# Patient Record
Sex: FEMALE | Race: WHITE | NOT HISPANIC OR LATINO | URBAN - METROPOLITAN AREA
[De-identification: names, ages, dates, MRNs, and addresses within clinical notes are randomized per-mention and may not be internally consistent; named-entity substitution may affect disease eponyms.]

---

## 2017-01-12 ENCOUNTER — OFFICE (OUTPATIENT)
Dept: URBAN - METROPOLITAN AREA CLINIC 64 | Facility: CLINIC | Age: 57
End: 2017-01-12

## 2017-01-12 VITALS
WEIGHT: 88 LBS | SYSTOLIC BLOOD PRESSURE: 135 MMHG | HEIGHT: 62 IN | HEART RATE: 79 BPM | DIASTOLIC BLOOD PRESSURE: 77 MMHG

## 2017-01-12 DIAGNOSIS — Z43.1 ENCOUNTER FOR ATTENTION TO GASTROSTOMY: ICD-10-CM

## 2017-01-12 DIAGNOSIS — R11.2 NAUSEA WITH VOMITING, UNSPECIFIED: ICD-10-CM

## 2017-01-12 PROCEDURE — 43760: CPT | Performed by: NURSE PRACTITIONER

## 2017-01-12 RX ORDER — ONDANSETRON HYDROCHLORIDE 4 MG/1
12 TABLET, FILM COATED ORAL
Qty: 21 | Refills: 3 | Status: ACTIVE
Start: 2017-01-12

## 2017-05-09 ENCOUNTER — HOSPITAL ENCOUNTER (OUTPATIENT)
Dept: FAMILY MEDICINE CLINIC | Facility: CLINIC | Age: 57
Setting detail: SPECIMEN
Discharge: HOME | End: 2017-05-09
Attending: NURSE PRACTITIONER | Admitting: NURSE PRACTITIONER

## 2017-05-09 LAB
AMPICILLIN SUSC ISLT: NORMAL
AZTREONAM SUSC ISLT: NORMAL
BACTERIA ISLT: NORMAL
BACTERIA SPEC AEROBE CULT: NORMAL
CEFAZOLIN SUSC ISLT: NORMAL
CEFEPIME SUSC ISLT: NORMAL
CEFTRIAXONE SUSC ISLT: NORMAL
CIPROFLOXACIN SUSC ISLT: NORMAL
COLONY COUNT: NORMAL
ERTAPENEM SUSC ISLT: NORMAL
LEVOFLOXACIN SUSC ISLT: NORMAL
Lab: NORMAL
MEROPENEM SUSC ISLT: NORMAL
MICRO REPORT STATUS: NORMAL
NITROFURANTOIN SUSC ISLT: NORMAL
PIP+TAZO SUSC ISLT: NORMAL
SPECIMEN SOURCE: NORMAL
SUSC METH SPEC: NORMAL
TETRACYCLINE SUSC ISLT: NORMAL
TOBRAMYCIN SUSC ISLT: NORMAL
TRIMETHOPRIM/SULFA: NORMAL

## 2017-07-21 ENCOUNTER — OFFICE (OUTPATIENT)
Dept: URBAN - METROPOLITAN AREA CLINIC 64 | Facility: CLINIC | Age: 57
End: 2017-07-21

## 2017-07-21 VITALS
SYSTOLIC BLOOD PRESSURE: 119 MMHG | HEART RATE: 54 BPM | DIASTOLIC BLOOD PRESSURE: 91 MMHG | HEIGHT: 62 IN | WEIGHT: 88 LBS

## 2017-07-21 DIAGNOSIS — R11.2 NAUSEA WITH VOMITING, UNSPECIFIED: ICD-10-CM

## 2017-07-21 DIAGNOSIS — Z43.1 ENCOUNTER FOR ATTENTION TO GASTROSTOMY: ICD-10-CM

## 2017-07-21 PROCEDURE — 99214 OFFICE O/P EST MOD 30 MIN: CPT | Performed by: NURSE PRACTITIONER

## 2017-07-21 RX ORDER — LANSOPRAZOLE 30 MG/1
TABLET, ORALLY DISINTEGRATING, DELAYED RELEASE ORAL
Qty: 30 | Refills: 3 | Status: ACTIVE
Start: 2017-07-21

## 2017-07-21 RX ORDER — METOCLOPRAMIDE HYDROCHLORIDE 5 MG/1
10 TABLET ORAL
Qty: 60 | Refills: 1 | Status: COMPLETED
Start: 2017-07-21 | End: 2017-11-14

## 2017-07-25 ENCOUNTER — ON CAMPUS - OUTPATIENT (OUTPATIENT)
Dept: URBAN - METROPOLITAN AREA HOSPITAL 85 | Facility: HOSPITAL | Age: 57
End: 2017-07-25
Payer: COMMERCIAL

## 2017-07-25 DIAGNOSIS — R11.2 NAUSEA WITH VOMITING, UNSPECIFIED: ICD-10-CM

## 2017-07-25 DIAGNOSIS — K21.9 GASTRO-ESOPHAGEAL REFLUX DISEASE WITHOUT ESOPHAGITIS: ICD-10-CM

## 2017-07-25 DIAGNOSIS — R63.3 FEEDING DIFFICULTIES: ICD-10-CM

## 2017-07-25 PROCEDURE — 99204 OFFICE O/P NEW MOD 45 MIN: CPT | Performed by: INTERNAL MEDICINE

## 2017-08-01 ENCOUNTER — HOSPITAL ENCOUNTER (OUTPATIENT)
Dept: FAMILY MEDICINE CLINIC | Facility: CLINIC | Age: 57
Setting detail: SPECIMEN
Discharge: HOME | End: 2017-08-01
Attending: FAMILY MEDICINE | Admitting: FAMILY MEDICINE

## 2017-08-10 ENCOUNTER — HOSPITAL ENCOUNTER (OUTPATIENT)
Dept: INFUSION THERAPY | Facility: HOSPITAL | Age: 57
Discharge: HOME | End: 2017-08-10
Attending: FAMILY MEDICINE | Admitting: FAMILY MEDICINE

## 2017-08-10 LAB
ALBUMIN SERPL-MCNC: 3.4 G/DL (ref 3.5–4.8)
ALBUMIN/GLOB SERPL: 0.9 {RATIO} (ref 1–1.7)
ALP SERPL-CCNC: 92 IU/L (ref 32–91)
ALT SERPL-CCNC: 7 IU/L (ref 14–54)
ANION GAP SERPL CALC-SCNC: 14 MMOL/L (ref 10–20)
AST SERPL-CCNC: 15 IU/L (ref 15–41)
BASOPHILS # BLD AUTO: 0 10*3/UL (ref 0–0.2)
BASOPHILS NFR BLD AUTO: 0 % (ref 0–2)
BILIRUB SERPL-MCNC: 0.5 MG/DL (ref 0.3–1.2)
BUN SERPL-MCNC: 18 MG/DL (ref 8–20)
BUN/CREAT SERPL: 20 (ref 5.4–26.2)
CALCIUM SERPL-MCNC: 8.5 MG/DL (ref 8.9–10.3)
CHLORIDE SERPL-SCNC: 96 MMOL/L (ref 101–111)
CONV CO2: 25 MMOL/L (ref 22–32)
CONV TOTAL PROTEIN: 7 G/DL (ref 6.1–7.9)
CREAT UR-MCNC: 0.9 MG/DL (ref 0.4–1)
DIFFERENTIAL METHOD BLD: (no result)
EOSINOPHIL # BLD AUTO: 0 10*3/UL (ref 0–0.3)
EOSINOPHIL # BLD AUTO: 1 % (ref 0–3)
ERYTHROCYTE [DISTWIDTH] IN BLOOD BY AUTOMATED COUNT: 15.7 % (ref 11.5–14.5)
ERYTHROCYTE [SEDIMENTATION RATE] IN BLOOD BY WESTERGREN METHOD: 107 MM/HR (ref 0–30)
GLOBULIN UR ELPH-MCNC: 3.6 G/DL (ref 2.5–3.8)
GLUCOSE SERPL-MCNC: 108 MG/DL (ref 65–99)
HCT VFR BLD AUTO: 25.9 % (ref 35–49)
HGB BLD-MCNC: (no result) G/DL (ref 12–15)
LYMPHOCYTES # BLD AUTO: 0.5 10*3/UL (ref 0.8–4.8)
LYMPHOCYTES NFR BLD AUTO: 5 % (ref 18–42)
MCH RBC QN AUTO: 32.7 PG (ref 26–32)
MCHC RBC AUTO-ENTMCNC: 33.2 G/DL (ref 32–36)
MCV RBC AUTO: 98.5 FL (ref 80–94)
MONOCYTES # BLD AUTO: 0.4 10*3/UL (ref 0.1–1.3)
MONOCYTES NFR BLD AUTO: 4 % (ref 2–11)
NEUTROPHILS # BLD AUTO: 8.5 10*3/UL (ref 2.3–8.6)
NEUTROPHILS NFR BLD AUTO: 90 % (ref 50–75)
NRBC BLD AUTO-RTO: 0 /100{WBCS}
NRBC/RBC NFR BLD MANUAL: 0 10*3/UL
PLATELET # BLD AUTO: 300 10*3/UL (ref 150–450)
PMV BLD AUTO: 7 FL (ref 7.4–10.4)
POTASSIUM SERPL-SCNC: 5 MMOL/L (ref 3.6–5.1)
RBC # BLD AUTO: 2.63 10*6/UL (ref 4–5.4)
SODIUM SERPL-SCNC: 130 MMOL/L (ref 136–144)
WBC # BLD AUTO: 9.4 10*3/UL (ref 4.5–11.5)

## 2017-08-11 ENCOUNTER — OFFICE (OUTPATIENT)
Dept: URBAN - METROPOLITAN AREA CLINIC 64 | Facility: CLINIC | Age: 57
End: 2017-08-11

## 2017-08-11 VITALS — HEIGHT: 62 IN | DIASTOLIC BLOOD PRESSURE: 56 MMHG | SYSTOLIC BLOOD PRESSURE: 128 MMHG | HEART RATE: 58 BPM

## 2017-08-11 DIAGNOSIS — Z43.1 ENCOUNTER FOR ATTENTION TO GASTROSTOMY: ICD-10-CM

## 2017-08-11 DIAGNOSIS — R50.9 FEVER, UNSPECIFIED: ICD-10-CM

## 2017-08-11 DIAGNOSIS — R11.2 NAUSEA WITH VOMITING, UNSPECIFIED: ICD-10-CM

## 2017-08-11 PROCEDURE — 99213 OFFICE O/P EST LOW 20 MIN: CPT | Performed by: NURSE PRACTITIONER

## 2017-08-11 RX ORDER — CEPHALEXIN 500 MG/1
2000 CAPSULE ORAL
Qty: 40 | Refills: 0 | Status: COMPLETED
Start: 2017-08-11 | End: 2019-01-01

## 2017-08-15 ENCOUNTER — HOSPITAL ENCOUNTER (OUTPATIENT)
Dept: FAMILY MEDICINE CLINIC | Facility: CLINIC | Age: 57
Setting detail: SPECIMEN
Discharge: HOME | End: 2017-08-15
Attending: FAMILY MEDICINE | Admitting: FAMILY MEDICINE

## 2017-08-16 ENCOUNTER — HOSPITAL ENCOUNTER (OUTPATIENT)
Dept: INFUSION THERAPY | Facility: HOSPITAL | Age: 57
Discharge: HOME | End: 2017-08-16
Attending: FAMILY MEDICINE | Admitting: FAMILY MEDICINE

## 2017-08-16 LAB
ABS VARIANT LYMPHS: 0.05 10*3/UL
ALBUMIN SERPL-MCNC: 3.3 G/DL (ref 3.5–4.8)
ALBUMIN/GLOB SERPL: 0.9 {RATIO} (ref 1–1.7)
ALP SERPL-CCNC: 93 IU/L (ref 32–91)
ALT SERPL-CCNC: 10 IU/L (ref 14–54)
ANION GAP SERPL CALC-SCNC: 14.4 MMOL/L (ref 10–20)
AST SERPL-CCNC: 15 IU/L (ref 15–41)
BILIRUB SERPL-MCNC: 0.4 MG/DL (ref 0.3–1.2)
BUN SERPL-MCNC: 20 MG/DL (ref 8–20)
BUN/CREAT SERPL: 18.2 (ref 5.4–26.2)
CALCIUM SERPL-MCNC: 8.7 MG/DL (ref 8.9–10.3)
CHLORIDE SERPL-SCNC: 100 MMOL/L (ref 101–111)
CONV CO2: 24 MMOL/L (ref 22–32)
CONV TOTAL PROTEIN: 6.8 G/DL (ref 6.1–7.9)
CONV VARIANT LYMPHOCYTES RELATIVE PERCENT BY MANUAL COUNT: 1 % (ref 0–1)
CREAT UR-MCNC: 1.1 MG/DL (ref 0.4–1)
DIFFERENTIAL METHOD BLD: (no result)
EOSINOPHIL # BLD AUTO: 0.1 10*3/UL (ref 0–0.3)
EOSINOPHIL # BLD AUTO: 1 % (ref 0–3)
ERYTHROCYTE [DISTWIDTH] IN BLOOD BY AUTOMATED COUNT: 15.5 % (ref 11.5–14.5)
FERRITIN SERPL-MCNC: 828 NG/ML (ref 11–307)
FOLATE SERPL-MCNC: 18.3 NG/ML (ref 5.9–24.8)
GLOBULIN UR ELPH-MCNC: 3.5 G/DL (ref 2.5–3.8)
GLUCOSE SERPL-MCNC: 91 MG/DL (ref 65–99)
HCT VFR BLD AUTO: 25.5 % (ref 35–49)
HGB BLD-MCNC: 8.3 G/DL (ref 12–15)
IRON SERPL-MCNC: 31 UG/DL (ref 28–170)
LYMPHOCYTES # BLD AUTO: 0.4 10*3/UL (ref 0.8–4.8)
LYMPHOCYTES NFR BLD AUTO: 8 % (ref 18–42)
MAGNESIUM UR-MCNC: 1.21 % (ref 0.5–1.5)
MCH RBC QN AUTO: 31.9 PG (ref 26–32)
MCHC RBC AUTO-ENTMCNC: 32.7 G/DL (ref 32–36)
MCV RBC AUTO: 97.5 FL (ref 80–94)
MONOCYTES # BLD AUTO: 0.2 10*3/UL (ref 0.1–1.3)
MONOCYTES NFR BLD AUTO: 4 % (ref 2–11)
NEUTROPHILS # BLD AUTO: 4.3 10*3/UL (ref 2.3–8.6)
NEUTROPHILS NFR BLD AUTO: 86 % (ref 50–75)
PLATELET # BLD AUTO: (no result) 10*3/UL (ref 150–450)
PLT COMMENT: (no result)
PMV BLD AUTO: 6.4 FL (ref 7.4–10.4)
POTASSIUM SERPL-SCNC: 4.4 MMOL/L (ref 3.6–5.1)
RBC # BLD AUTO: 2.61 10*6/UL (ref 4–5.4)
RETICS/RBC NFR MANUAL: 0.03 10*6/UL
SODIUM SERPL-SCNC: 134 MMOL/L (ref 136–144)
VIT B12 SERPL-MCNC: >1500 PG/ML (ref 180–914)
WBC # BLD AUTO: 5 10*3/UL (ref 4.5–11.5)

## 2017-08-18 ENCOUNTER — HOSPITAL ENCOUNTER (OUTPATIENT)
Dept: MRI IMAGING | Facility: HOSPITAL | Age: 57
Discharge: HOME | End: 2017-08-18
Attending: FAMILY MEDICINE | Admitting: FAMILY MEDICINE

## 2017-09-19 ENCOUNTER — HOSPITAL ENCOUNTER (OUTPATIENT)
Dept: PAIN MEDICINE | Facility: HOSPITAL | Age: 57
Discharge: HOME | End: 2017-09-19
Attending: ANESTHESIOLOGY | Admitting: ANESTHESIOLOGY

## 2017-10-03 ENCOUNTER — INPATIENT HOSPITAL (OUTPATIENT)
Dept: URBAN - METROPOLITAN AREA HOSPITAL 85 | Facility: HOSPITAL | Age: 57
End: 2017-10-03
Payer: COMMERCIAL

## 2017-10-03 DIAGNOSIS — Z93.1 GASTROSTOMY STATUS: ICD-10-CM

## 2017-10-03 DIAGNOSIS — R04.2 HEMOPTYSIS: ICD-10-CM

## 2017-10-03 DIAGNOSIS — C09.9 MALIGNANT NEOPLASM OF TONSIL, UNSPECIFIED: ICD-10-CM

## 2017-10-03 DIAGNOSIS — R11.2 NAUSEA WITH VOMITING, UNSPECIFIED: ICD-10-CM

## 2017-10-03 DIAGNOSIS — D50.9 IRON DEFICIENCY ANEMIA, UNSPECIFIED: ICD-10-CM

## 2017-10-03 DIAGNOSIS — R63.3 FEEDING DIFFICULTIES: ICD-10-CM

## 2017-10-03 PROCEDURE — 43235 EGD DIAGNOSTIC BRUSH WASH: CPT | Performed by: INTERNAL MEDICINE

## 2017-10-10 ENCOUNTER — HOSPITAL ENCOUNTER (OUTPATIENT)
Dept: PAIN MEDICINE | Facility: HOSPITAL | Age: 57
Discharge: HOME | End: 2017-10-10
Attending: ANESTHESIOLOGY | Admitting: ANESTHESIOLOGY

## 2017-10-13 ENCOUNTER — HOSPITAL ENCOUNTER (OUTPATIENT)
Dept: INFUSION THERAPY | Facility: HOSPITAL | Age: 57
Discharge: HOME | End: 2017-10-13
Attending: FAMILY MEDICINE | Admitting: FAMILY MEDICINE

## 2017-10-13 LAB — CALCIUM SERPL-MCNC: 8.9 MG/DL (ref 8.9–10.3)

## 2017-11-07 ENCOUNTER — HOSPITAL ENCOUNTER (OUTPATIENT)
Dept: PAIN MEDICINE | Facility: HOSPITAL | Age: 57
Discharge: HOME | End: 2017-11-07
Attending: ANESTHESIOLOGY | Admitting: ANESTHESIOLOGY

## 2017-11-08 ENCOUNTER — HOSPITAL ENCOUNTER (OUTPATIENT)
Dept: GENERAL RADIOLOGY | Facility: HOSPITAL | Age: 57
Discharge: HOME | End: 2017-11-08
Attending: FAMILY MEDICINE | Admitting: FAMILY MEDICINE

## 2017-11-08 ENCOUNTER — HOSPITAL ENCOUNTER (OUTPATIENT)
Dept: FAMILY MEDICINE CLINIC | Facility: CLINIC | Age: 57
Setting detail: SPECIMEN
Discharge: HOME | End: 2017-11-08
Attending: FAMILY MEDICINE | Admitting: FAMILY MEDICINE

## 2017-11-08 LAB
ALBUMIN SERPL-MCNC: 3.8 G/DL (ref 3.5–4.8)
ALBUMIN/GLOB SERPL: 0.9 {RATIO} (ref 1–1.7)
ALP SERPL-CCNC: 66 IU/L (ref 32–91)
ALT SERPL-CCNC: 14 IU/L (ref 14–54)
ANION GAP SERPL CALC-SCNC: 14 MMOL/L (ref 10–20)
AST SERPL-CCNC: 26 IU/L (ref 15–41)
B PERT DNA SPEC QL NAA+PROBE: NOT DETECTED
BASOPHILS # BLD AUTO: 0 10*3/UL (ref 0–0.2)
BASOPHILS NFR BLD AUTO: 0 % (ref 0–2)
BILIRUB SERPL-MCNC: 0.5 MG/DL (ref 0.3–1.2)
BUN SERPL-MCNC: 14 MG/DL (ref 8–20)
BUN/CREAT SERPL: 9.3 (ref 5.4–26.2)
C PNEUM DNA NPH QL NAA+NON-PROBE: NOT DETECTED
CALCIUM SERPL-MCNC: 6.7 MG/DL (ref 8.9–10.3)
CHLORIDE SERPL-SCNC: 97 MMOL/L (ref 101–111)
CONV CO2: 22 MMOL/L (ref 22–32)
CONV RESPNL SPECIMEN: NORMAL
CONV TOTAL PROTEIN: 7.9 G/DL (ref 6.1–7.9)
CREAT UR-MCNC: 1.5 MG/DL (ref 0.4–1)
DIFFERENTIAL METHOD BLD: (no result)
EOSINOPHIL # BLD AUTO: 0.1 10*3/UL (ref 0–0.3)
EOSINOPHIL # BLD AUTO: 1 % (ref 0–3)
ERYTHROCYTE [DISTWIDTH] IN BLOOD BY AUTOMATED COUNT: 16.7 % (ref 11.5–14.5)
FLUAV H1 2009 PAND RNA NPH QL NAA+PROBE: NOT DETECTED
FLUAV H1 HA GENE NPH QL NAA+PROBE: NOT DETECTED
FLUAV H3 RNA NPH QL NAA+PROBE: NOT DETECTED
FLUAV SUBTYP SPEC NAA+PROBE: NOT DETECTED
FLUBV RNA ISLT QL NAA+PROBE: NOT DETECTED
GLOBULIN UR ELPH-MCNC: 4.1 G/DL (ref 2.5–3.8)
GLUCOSE SERPL-MCNC: 109 MG/DL (ref 65–99)
HADV DNA SPEC NAA+PROBE: NOT DETECTED
HCOV 229E RNA SPEC QL NAA+PROBE: NOT DETECTED
HCOV HKU1 RNA SPEC QL NAA+PROBE: NOT DETECTED
HCOV NL63 RNA SPEC QL NAA+PROBE: NOT DETECTED
HCOV OC43 RNA SPEC QL NAA+PROBE: NOT DETECTED
HCT VFR BLD AUTO: 28.5 % (ref 35–49)
HGB BLD-MCNC: 9.1 G/DL (ref 12–15)
HMPV RNA NPH QL NAA+NON-PROBE: NOT DETECTED
HPIV1 RNA ISLT QL NAA+PROBE: NOT DETECTED
HPIV2 RNA SPEC QL NAA+PROBE: NOT DETECTED
HPIV3 RNA NPH QL NAA+PROBE: NOT DETECTED
HPIV4 P GENE NPH QL NAA+PROBE: NOT DETECTED
LYMPHOCYTES # BLD AUTO: 0.4 10*3/UL (ref 0.8–4.8)
LYMPHOCYTES NFR BLD AUTO: 3 % (ref 18–42)
M PNEUMO IGG SER IA-ACNC: NOT DETECTED
MAGNESIUM SERPL-MCNC: 1.4 MG/DL (ref 1.8–2.5)
MCH RBC QN AUTO: 30.4 PG (ref 26–32)
MCHC RBC AUTO-ENTMCNC: 32.1 G/DL (ref 32–36)
MCV RBC AUTO: 94.6 FL (ref 80–94)
MONOCYTES # BLD AUTO: 0.5 10*3/UL (ref 0.1–1.3)
MONOCYTES NFR BLD AUTO: 4 % (ref 2–11)
NEUTROPHILS # BLD AUTO: 13.3 10*3/UL (ref 2.3–8.6)
NEUTROPHILS NFR BLD AUTO: 92 % (ref 50–75)
NRBC BLD AUTO-RTO: 0 /100{WBCS}
NRBC/RBC NFR BLD MANUAL: 0 10*3/UL
PLATELET # BLD AUTO: 362 10*3/UL (ref 150–450)
PMV BLD AUTO: 7.8 FL (ref 7.4–10.4)
POTASSIUM SERPL-SCNC: 4 MMOL/L (ref 3.6–5.1)
RBC # BLD AUTO: 3.01 10*6/UL (ref 4–5.4)
RHINOVIRUS RNA SPEC NAA+PROBE: NOT DETECTED
RSV RNA NPH QL NAA+NON-PROBE: NOT DETECTED
SODIUM SERPL-SCNC: 129 MMOL/L (ref 136–144)
WBC # BLD AUTO: 14.3 10*3/UL (ref 4.5–11.5)

## 2017-11-14 ENCOUNTER — OFFICE (OUTPATIENT)
Dept: URBAN - METROPOLITAN AREA CLINIC 64 | Facility: CLINIC | Age: 57
End: 2017-11-14
Payer: COMMERCIAL

## 2017-11-14 VITALS
WEIGHT: 90 LBS | SYSTOLIC BLOOD PRESSURE: 126 MMHG | DIASTOLIC BLOOD PRESSURE: 75 MMHG | HEART RATE: 70 BPM | HEIGHT: 62 IN

## 2017-11-14 DIAGNOSIS — Z43.1 ENCOUNTER FOR ATTENTION TO GASTROSTOMY: ICD-10-CM

## 2017-11-14 PROCEDURE — 43760: CPT | Performed by: INTERNAL MEDICINE

## 2017-11-27 ENCOUNTER — HOSPITAL ENCOUNTER (OUTPATIENT)
Dept: FAMILY MEDICINE CLINIC | Facility: CLINIC | Age: 57
Setting detail: SPECIMEN
Discharge: HOME | End: 2017-11-27
Attending: FAMILY MEDICINE | Admitting: FAMILY MEDICINE

## 2017-11-27 LAB
ALBUMIN SERPL-MCNC: 4 G/DL (ref 3.5–4.8)
ALBUMIN/GLOB SERPL: 1.1 {RATIO} (ref 1–1.7)
ALP SERPL-CCNC: 71 IU/L (ref 32–91)
ALT SERPL-CCNC: 7 IU/L (ref 14–54)
ANION GAP SERPL CALC-SCNC: 14.2 MMOL/L (ref 10–20)
AST SERPL-CCNC: 19 IU/L (ref 15–41)
BASOPHILS # BLD AUTO: 0 10*3/UL (ref 0–0.2)
BASOPHILS NFR BLD AUTO: 1 % (ref 0–2)
BILIRUB SERPL-MCNC: 0.4 MG/DL (ref 0.3–1.2)
BUN SERPL-MCNC: 10 MG/DL (ref 8–20)
BUN/CREAT SERPL: 7.7 (ref 5.4–26.2)
CALCIUM SERPL-MCNC: 8.5 MG/DL (ref 8.9–10.3)
CHLORIDE SERPL-SCNC: 97 MMOL/L (ref 101–111)
CONV CO2: 23 MMOL/L (ref 22–32)
CONV TOTAL PROTEIN: 7.7 G/DL (ref 6.1–7.9)
CREAT UR-MCNC: 1.3 MG/DL (ref 0.4–1)
DIFFERENTIAL METHOD BLD: (no result)
EOSINOPHIL # BLD AUTO: 0.1 10*3/UL (ref 0–0.3)
EOSINOPHIL # BLD AUTO: 3 % (ref 0–3)
ERYTHROCYTE [DISTWIDTH] IN BLOOD BY AUTOMATED COUNT: 16 % (ref 11.5–14.5)
GLOBULIN UR ELPH-MCNC: 3.7 G/DL (ref 2.5–3.8)
GLUCOSE SERPL-MCNC: 87 MG/DL (ref 65–99)
HCT VFR BLD AUTO: 28 % (ref 35–49)
HGB BLD-MCNC: 9.1 G/DL (ref 12–15)
LYMPHOCYTES # BLD AUTO: 0.7 10*3/UL (ref 0.8–4.8)
LYMPHOCYTES NFR BLD AUTO: 20 % (ref 18–42)
MCH RBC QN AUTO: 30.1 PG (ref 26–32)
MCHC RBC AUTO-ENTMCNC: 32.4 G/DL (ref 32–36)
MCV RBC AUTO: 93 FL (ref 80–94)
MONOCYTES # BLD AUTO: 0.4 10*3/UL (ref 0.1–1.3)
MONOCYTES NFR BLD AUTO: 10 % (ref 2–11)
NEUTROPHILS # BLD AUTO: 2.5 10*3/UL (ref 2.3–8.6)
NEUTROPHILS NFR BLD AUTO: 66 % (ref 50–75)
NRBC BLD AUTO-RTO: 0 /100{WBCS}
NRBC/RBC NFR BLD MANUAL: 0 10*3/UL
PLATELET # BLD AUTO: 274 10*3/UL (ref 150–450)
PMV BLD AUTO: 7.7 FL (ref 7.4–10.4)
POTASSIUM SERPL-SCNC: 4.2 MMOL/L (ref 3.6–5.1)
RBC # BLD AUTO: 3.01 10*6/UL (ref 4–5.4)
SODIUM SERPL-SCNC: 130 MMOL/L (ref 136–144)
WBC # BLD AUTO: (no result) 10*3/UL (ref 4.5–11.5)

## 2017-12-28 ENCOUNTER — HOSPITAL ENCOUNTER (OUTPATIENT)
Dept: LAB | Facility: HOSPITAL | Age: 57
Setting detail: SPECIMEN
Discharge: HOME | End: 2017-12-28
Attending: INTERNAL MEDICINE | Admitting: INTERNAL MEDICINE

## 2017-12-28 LAB
ANION GAP SERPL CALC-SCNC: 13.1 MMOL/L (ref 10–20)
BUN SERPL-MCNC: 20 MG/DL (ref 8–20)
BUN/CREAT SERPL: 16.7 (ref 5.4–26.2)
CALCIUM SERPL-MCNC: 8.8 MG/DL (ref 8.9–10.3)
CHLORIDE SERPL-SCNC: 95 MMOL/L (ref 101–111)
CONV CO2: 25 MMOL/L (ref 22–32)
CREAT UR-MCNC: 1.2 MG/DL (ref 0.4–1)
GLUCOSE SERPL-MCNC: 86 MG/DL (ref 65–99)
MAGNESIUM SERPL-MCNC: 1.9 MG/DL (ref 1.8–2.5)
POTASSIUM SERPL-SCNC: 4.1 MMOL/L (ref 3.6–5.1)
SODIUM SERPL-SCNC: 129 MMOL/L (ref 136–144)

## 2018-01-08 ENCOUNTER — HOSPITAL ENCOUNTER (OUTPATIENT)
Dept: PAIN MEDICINE | Facility: HOSPITAL | Age: 58
Discharge: HOME | End: 2018-01-08
Attending: ANESTHESIOLOGY | Admitting: ANESTHESIOLOGY

## 2018-01-08 LAB
AMPHETAMINES UR QL SCN: NEGATIVE
BZE UR QL SCN: NEGATIVE
CREAT 24H UR-MCNC: NORMAL MG/DL
METHADONE UR QL SCN: NEGATIVE
OPIATE CONFIRMATION URINE: NORMAL
THC SERPLBLD CFM-MCNC: NEGATIVE NG/ML

## 2018-02-07 ENCOUNTER — HOSPITAL ENCOUNTER (OUTPATIENT)
Dept: PAIN MEDICINE | Facility: HOSPITAL | Age: 58
Discharge: HOME | End: 2018-02-07
Attending: ANESTHESIOLOGY | Admitting: ANESTHESIOLOGY

## 2018-02-08 ENCOUNTER — OFFICE (OUTPATIENT)
Dept: URBAN - METROPOLITAN AREA CLINIC 64 | Facility: CLINIC | Age: 58
End: 2018-02-08

## 2018-02-08 VITALS
SYSTOLIC BLOOD PRESSURE: 126 MMHG | WEIGHT: 84 LBS | DIASTOLIC BLOOD PRESSURE: 71 MMHG | HEIGHT: 62 IN | HEART RATE: 75 BPM

## 2018-02-08 DIAGNOSIS — Z43.1 ENCOUNTER FOR ATTENTION TO GASTROSTOMY: ICD-10-CM

## 2018-02-08 PROCEDURE — 43760: CPT | Performed by: NURSE PRACTITIONER

## 2018-03-06 ENCOUNTER — HOSPITAL ENCOUNTER (OUTPATIENT)
Dept: PAIN MEDICINE | Facility: HOSPITAL | Age: 58
Discharge: HOME | End: 2018-03-06
Attending: ANESTHESIOLOGY | Admitting: ANESTHESIOLOGY

## 2018-03-12 ENCOUNTER — HOSPITAL ENCOUNTER (OUTPATIENT)
Dept: FAMILY MEDICINE CLINIC | Facility: CLINIC | Age: 58
Setting detail: SPECIMEN
Discharge: HOME | End: 2018-03-12
Attending: FAMILY MEDICINE | Admitting: FAMILY MEDICINE

## 2018-03-12 LAB
ALBUMIN SERPL-MCNC: 4.3 G/DL (ref 3.5–4.8)
ALBUMIN/GLOB SERPL: 1.2 {RATIO} (ref 1–1.7)
ALP SERPL-CCNC: 63 IU/L (ref 32–91)
ALT SERPL-CCNC: 15 IU/L (ref 14–54)
ANION GAP SERPL CALC-SCNC: 10.7 MMOL/L (ref 10–20)
AST SERPL-CCNC: 25 IU/L (ref 15–41)
BASOPHILS # BLD AUTO: 0 10*3/UL (ref 0–0.2)
BASOPHILS NFR BLD AUTO: 1 % (ref 0–2)
BILIRUB SERPL-MCNC: 0.4 MG/DL (ref 0.3–1.2)
BUN SERPL-MCNC: 22 MG/DL (ref 8–20)
BUN/CREAT SERPL: 22 (ref 5.4–26.2)
CALCIUM SERPL-MCNC: 9 MG/DL (ref 8.9–10.3)
CHLORIDE SERPL-SCNC: 94 MMOL/L (ref 101–111)
CHOLEST SERPL-MCNC: 130 MG/DL
CHOLEST/HDLC SERPL: 1.8 {RATIO}
CONV CO2: 26 MMOL/L (ref 22–32)
CONV LDL CHOLESTEROL DIRECT: 33 MG/DL (ref 0–100)
CONV TOTAL PROTEIN: 7.9 G/DL (ref 6.1–7.9)
CREAT UR-MCNC: 1 MG/DL (ref 0.4–1)
DIFFERENTIAL METHOD BLD: (no result)
EOSINOPHIL # BLD AUTO: 0.2 10*3/UL (ref 0–0.3)
EOSINOPHIL # BLD AUTO: 5 % (ref 0–3)
ERYTHROCYTE [DISTWIDTH] IN BLOOD BY AUTOMATED COUNT: 16.4 % (ref 11.5–14.5)
GLOBULIN UR ELPH-MCNC: 3.6 G/DL (ref 2.5–3.8)
GLUCOSE SERPL-MCNC: 94 MG/DL (ref 65–99)
HCT VFR BLD AUTO: 29.7 % (ref 35–49)
HDLC SERPL-MCNC: 73 MG/DL
HGB BLD-MCNC: 9.9 G/DL (ref 12–15)
LDLC/HDLC SERPL: 0.5 {RATIO}
LIPID INTERPRETATION: ABNORMAL
LYMPHOCYTES # BLD AUTO: 0.6 10*3/UL (ref 0.8–4.8)
LYMPHOCYTES NFR BLD AUTO: 16 % (ref 18–42)
MCH RBC QN AUTO: 34.2 PG (ref 26–32)
MCHC RBC AUTO-ENTMCNC: 33.5 G/DL (ref 32–36)
MCV RBC AUTO: 102.2 FL (ref 80–94)
MONOCYTES # BLD AUTO: 0.3 10*3/UL (ref 0.1–1.3)
MONOCYTES NFR BLD AUTO: 9 % (ref 2–11)
NEUTROPHILS # BLD AUTO: 2.4 10*3/UL (ref 2.3–8.6)
NEUTROPHILS NFR BLD AUTO: 69 % (ref 50–75)
NRBC BLD AUTO-RTO: 0 /100{WBCS}
NRBC/RBC NFR BLD MANUAL: 0 10*3/UL
PLATELET # BLD AUTO: 271 10*3/UL (ref 150–450)
PMV BLD AUTO: 7.9 FL (ref 7.4–10.4)
POTASSIUM SERPL-SCNC: 3.7 MMOL/L (ref 3.6–5.1)
RBC # BLD AUTO: 2.91 10*6/UL (ref 4–5.4)
SODIUM SERPL-SCNC: 127 MMOL/L (ref 136–144)
TRIGL SERPL-MCNC: 117 MG/DL
VLDLC SERPL CALC-MCNC: 24.2 MG/DL
WBC # BLD AUTO: 3.4 10*3/UL (ref 4.5–11.5)

## 2018-05-01 ENCOUNTER — HOSPITAL ENCOUNTER (OUTPATIENT)
Dept: FAMILY MEDICINE CLINIC | Facility: CLINIC | Age: 58
Setting detail: SPECIMEN
Discharge: HOME | End: 2018-05-01
Attending: PHYSICIAN ASSISTANT | Admitting: PHYSICIAN ASSISTANT

## 2018-05-01 LAB
ALBUMIN SERPL-MCNC: 3.4 G/DL (ref 3.5–4.8)
ALBUMIN/GLOB SERPL: 1 {RATIO} (ref 1–1.7)
ALP SERPL-CCNC: 76 IU/L (ref 32–91)
ALT SERPL-CCNC: 11 IU/L (ref 14–54)
ANION GAP SERPL CALC-SCNC: 14.9 MMOL/L (ref 10–20)
AST SERPL-CCNC: 20 IU/L (ref 15–41)
BACTERIA ISLT: NORMAL
BACTERIA SPEC AEROBE CULT: NORMAL
BASOPHILS # BLD AUTO: 0 10*3/UL (ref 0–0.2)
BASOPHILS NFR BLD AUTO: 0 % (ref 0–2)
BILIRUB SERPL-MCNC: 0.3 MG/DL (ref 0.3–1.2)
BUN SERPL-MCNC: 26 MG/DL (ref 8–20)
BUN/CREAT SERPL: 17.3 (ref 5.4–26.2)
CALCIUM SERPL-MCNC: 9.8 MG/DL (ref 8.9–10.3)
CHLORIDE SERPL-SCNC: 92 MMOL/L (ref 101–111)
CLINDAMYCIN SUSC ISLT: NORMAL
CONV CO2: 27 MMOL/L (ref 22–32)
CONV TOTAL PROTEIN: 6.8 G/DL (ref 6.1–7.9)
CREAT UR-MCNC: 1.5 MG/DL (ref 0.4–1)
DIFFERENTIAL METHOD BLD: (no result)
EOSINOPHIL # BLD AUTO: 0.3 10*3/UL (ref 0–0.3)
EOSINOPHIL # BLD AUTO: 4 % (ref 0–3)
ERYTHROCYTE [DISTWIDTH] IN BLOOD BY AUTOMATED COUNT: 13.8 % (ref 11.5–14.5)
ERYTHROMYCIN SUSC ISLT: NORMAL
GLOBULIN UR ELPH-MCNC: 3.4 G/DL (ref 2.5–3.8)
GLUCOSE SERPL-MCNC: 123 MG/DL (ref 65–99)
GRAM STN SPEC: NORMAL
HCT VFR BLD AUTO: 25.2 % (ref 35–49)
HGB BLD-MCNC: 8.4 G/DL (ref 12–15)
LINEZOLID SUSC ISLT: NORMAL
LYMPHOCYTES # BLD AUTO: 0.4 10*3/UL (ref 0.8–4.8)
LYMPHOCYTES NFR BLD AUTO: 6 % (ref 18–42)
Lab: NORMAL
MCH RBC QN AUTO: 34.6 PG (ref 26–32)
MCHC RBC AUTO-ENTMCNC: 33.2 G/DL (ref 32–36)
MCV RBC AUTO: 104 FL (ref 80–94)
MICRO REPORT STATUS: NORMAL
MONOCYTES # BLD AUTO: 0.6 10*3/UL (ref 0.1–1.3)
MONOCYTES NFR BLD AUTO: 9 % (ref 2–11)
NEUTROPHILS # BLD AUTO: 5.4 10*3/UL (ref 2.3–8.6)
NEUTROPHILS NFR BLD AUTO: 81 % (ref 50–75)
NRBC BLD AUTO-RTO: 0 /100{WBCS}
NRBC/RBC NFR BLD MANUAL: 0 10*3/UL
OXACILLIN SUSC ISLT: NORMAL
PLATELET # BLD AUTO: 284 10*3/UL (ref 150–450)
PMV BLD AUTO: 8.1 FL (ref 7.4–10.4)
POTASSIUM SERPL-SCNC: 3.9 MMOL/L (ref 3.6–5.1)
RBC # BLD AUTO: 2.42 10*6/UL (ref 4–5.4)
SODIUM SERPL-SCNC: 130 MMOL/L (ref 136–144)
SPECIMEN SOURCE: NORMAL
SUSC METH SPEC: NORMAL
TETRACYCLINE SUSC ISLT: NORMAL
TRIMETHOPRIM/SULFA: NORMAL
VANCOMYCIN SUSC ISLT: NORMAL
WBC # BLD AUTO: 6.7 10*3/UL (ref 4.5–11.5)

## 2018-05-03 ENCOUNTER — HOSPITAL ENCOUNTER (OUTPATIENT)
Dept: PAIN MEDICINE | Facility: HOSPITAL | Age: 58
Discharge: HOME | End: 2018-05-03
Attending: ANESTHESIOLOGY | Admitting: ANESTHESIOLOGY

## 2018-05-07 ENCOUNTER — HOSPITAL ENCOUNTER (OUTPATIENT)
Dept: FAMILY MEDICINE CLINIC | Facility: CLINIC | Age: 58
Setting detail: SPECIMEN
Discharge: HOME | End: 2018-05-07
Attending: PHYSICIAN ASSISTANT | Admitting: PHYSICIAN ASSISTANT

## 2018-05-07 LAB
ANION GAP SERPL CALC-SCNC: 14.2 MMOL/L (ref 10–20)
BUN SERPL-MCNC: 18 MG/DL (ref 8–20)
BUN/CREAT SERPL: 13.8 (ref 5.4–26.2)
CALCIUM SERPL-MCNC: 9.2 MG/DL (ref 8.9–10.3)
CHLORIDE SERPL-SCNC: 88 MMOL/L (ref 101–111)
CONV CO2: 27 MMOL/L (ref 22–32)
CREAT UR-MCNC: 1.3 MG/DL (ref 0.4–1)
GLUCOSE SERPL-MCNC: 77 MG/DL (ref 65–99)
IRON SATN MFR SERPL: 16 % (ref 15–50)
IRON SERPL-MCNC: 35 UG/DL (ref 28–170)
MAGNESIUM UR-MCNC: 1.22 % (ref 0.5–1.5)
POTASSIUM SERPL-SCNC: 4.2 MMOL/L (ref 3.6–5.1)
RETICS/RBC NFR MANUAL: 0.04 10*6/UL
SODIUM SERPL-SCNC: 125 MMOL/L (ref 136–144)
TIBC SERPL-MCNC: 218 UG/DL (ref 228–428)

## 2018-05-08 LAB
CONV ABS BANDS: 1 10*3/UL
CONV ABS IMM GRAN: 0.95 10*3/UL
CONV MACROCYTES IN BLOOD BY LIGHT MICROSCOPY: SLIGHT
CONV POIKILOCYTOSIS IN BLOOD BY LIGHT MICROSCOPY: (no result)
DIFFERENTIAL METHOD BLD: (no result)
EOSINOPHIL # BLD AUTO: 0.3 10*3/UL (ref 0–0.3)
EOSINOPHIL # BLD AUTO: 4 % (ref 0–3)
ERYTHROCYTE [DISTWIDTH] IN BLOOD BY AUTOMATED COUNT: 14.3 % (ref 11.5–14.5)
HCT VFR BLD AUTO: 29.8 % (ref 35–49)
HGB BLD-MCNC: (no result) G/DL (ref 12–15)
LYMPHOCYTES # BLD AUTO: 0.4 10*3/UL (ref 0.8–4.8)
LYMPHOCYTES NFR BLD AUTO: 5 % (ref 18–42)
MCH RBC QN AUTO: 34.6 PG (ref 26–32)
MCHC RBC AUTO-ENTMCNC: 34 G/DL (ref 32–36)
MCV RBC AUTO: 101.8 FL (ref 80–94)
METAMYELOCYTES NFR BLD: 5 %
MONOCYTES # BLD AUTO: 1.2 10*3/UL (ref 0.1–1.3)
MONOCYTES NFR BLD AUTO: 15 % (ref 2–11)
MYELOCYTES NFR BLD MANUAL: 7 %
NEUTROPHILS # BLD AUTO: 4 10*3/UL (ref 2.3–8.6)
NEUTROPHILS NFR BLD AUTO: 51 % (ref 50–75)
NEUTS BAND NFR BLD MANUAL: 13 % (ref 0–5)
PATHOLOGIST REVIEW: (no result)
PATHOLOGIST REVIEW: (no result)
PLATELET # BLD AUTO: 357 10*3/UL (ref 150–450)
PMV BLD AUTO: 7.6 FL (ref 7.4–10.4)
RBC # BLD AUTO: 2.93 10*6/UL (ref 4–5.4)
WBC # BLD AUTO: 7.9 10*3/UL (ref 4.5–11.5)

## 2018-05-16 ENCOUNTER — HOSPITAL ENCOUNTER (OUTPATIENT)
Dept: FAMILY MEDICINE CLINIC | Facility: CLINIC | Age: 58
Setting detail: SPECIMEN
Discharge: HOME | End: 2018-05-16
Attending: NURSE PRACTITIONER | Admitting: NURSE PRACTITIONER

## 2018-05-16 LAB
ANION GAP SERPL CALC-SCNC: 13.1 MMOL/L (ref 10–20)
BUN SERPL-MCNC: 16 MG/DL (ref 8–20)
BUN/CREAT SERPL: 16 (ref 5.4–26.2)
CALCIUM SERPL-MCNC: 9.1 MG/DL (ref 8.9–10.3)
CHLORIDE SERPL-SCNC: 94 MMOL/L (ref 101–111)
CONV ABS BANDS: 0.1 10*3/UL
CONV ABS IMM GRAN: 0.05 10*3/UL
CONV CO2: 26 MMOL/L (ref 22–32)
CONV OVALOCYTES IN BLOOD BY LIGHT MICROSCOPY: (no result)
CONV ROULEAUX IN BLOOD BY LIGHT MICROSCOPY: SLIGHT
CONV SMEAR REVIEW: (no result)
CREAT UR-MCNC: 1 MG/DL (ref 0.4–1)
DIFFERENTIAL METHOD BLD: (no result)
EOSINOPHIL # BLD AUTO: 0.5 10*3/UL (ref 0–0.3)
EOSINOPHIL # BLD AUTO: 10 % (ref 0–3)
ERYTHROCYTE [DISTWIDTH] IN BLOOD BY AUTOMATED COUNT: 14.1 % (ref 11.5–14.5)
GLUCOSE SERPL-MCNC: 85 MG/DL (ref 65–99)
HCT VFR BLD AUTO: 29.3 % (ref 35–49)
HGB BLD-MCNC: 9.7 G/DL (ref 12–15)
LYMPHOCYTES # BLD AUTO: 0.4 10*3/UL (ref 0.8–4.8)
LYMPHOCYTES NFR BLD AUTO: 8 % (ref 18–42)
MCH RBC QN AUTO: 33.5 PG (ref 26–32)
MCHC RBC AUTO-ENTMCNC: 33 G/DL (ref 32–36)
MCV RBC AUTO: 101.4 FL (ref 80–94)
MONOCYTES # BLD AUTO: 0.4 10*3/UL (ref 0.1–1.3)
MONOCYTES NFR BLD AUTO: 7 % (ref 2–11)
MYELOCYTES NFR BLD MANUAL: 1 %
NEUTROPHILS # BLD AUTO: 3.7 10*3/UL (ref 2.3–8.6)
NEUTROPHILS NFR BLD AUTO: 73 % (ref 50–75)
NEUTS BAND NFR BLD MANUAL: 1 % (ref 0–5)
PLATELET # BLD AUTO: 331 10*3/UL (ref 150–450)
PMV BLD AUTO: 7.9 FL (ref 7.4–10.4)
POTASSIUM SERPL-SCNC: 4.1 MMOL/L (ref 3.6–5.1)
RBC # BLD AUTO: 2.89 10*6/UL (ref 4–5.4)
SODIUM SERPL-SCNC: 129 MMOL/L (ref 136–144)
WBC # BLD AUTO: 5.2 10*3/UL (ref 4.5–11.5)

## 2018-05-29 ENCOUNTER — HOSPITAL ENCOUNTER (OUTPATIENT)
Dept: FAMILY MEDICINE CLINIC | Facility: CLINIC | Age: 58
Setting detail: SPECIMEN
Discharge: HOME | End: 2018-05-29
Attending: NURSE PRACTITIONER | Admitting: NURSE PRACTITIONER

## 2018-05-29 LAB
ANION GAP SERPL CALC-SCNC: 18.4 MMOL/L (ref 10–20)
BASOPHILS # BLD AUTO: 0 10*3/UL (ref 0–0.2)
BASOPHILS NFR BLD AUTO: 1 % (ref 0–2)
BUN SERPL-MCNC: 25 MG/DL (ref 8–20)
BUN/CREAT SERPL: 17.9 (ref 5.4–26.2)
CALCIUM SERPL-MCNC: 9 MG/DL (ref 8.9–10.3)
CHLORIDE SERPL-SCNC: 89 MMOL/L (ref 101–111)
CONV CO2: 19 MMOL/L (ref 22–32)
CREAT UR-MCNC: 1.4 MG/DL (ref 0.4–1)
DIFFERENTIAL METHOD BLD: (no result)
EOSINOPHIL # BLD AUTO: 0.6 10*3/UL (ref 0–0.3)
EOSINOPHIL # BLD AUTO: 13 % (ref 0–3)
ERYTHROCYTE [DISTWIDTH] IN BLOOD BY AUTOMATED COUNT: 14.4 % (ref 11.5–14.5)
GLUCOSE SERPL-MCNC: 95 MG/DL (ref 65–99)
HCT VFR BLD AUTO: 33.3 % (ref 35–49)
HGB BLD-MCNC: 10.9 G/DL (ref 12–15)
LYMPHOCYTES # BLD AUTO: 0.4 10*3/UL (ref 0.8–4.8)
LYMPHOCYTES NFR BLD AUTO: 11 % (ref 18–42)
MCH RBC QN AUTO: 33.1 PG (ref 26–32)
MCHC RBC AUTO-ENTMCNC: 32.8 G/DL (ref 32–36)
MCV RBC AUTO: 100.9 FL (ref 80–94)
MONOCYTES # BLD AUTO: 0.3 10*3/UL (ref 0.1–1.3)
MONOCYTES NFR BLD AUTO: 6 % (ref 2–11)
NEUTROPHILS # BLD AUTO: 2.8 10*3/UL (ref 2.3–8.6)
NEUTROPHILS NFR BLD AUTO: 69 % (ref 50–75)
NRBC BLD AUTO-RTO: 0 /100{WBCS}
NRBC/RBC NFR BLD MANUAL: 0 10*3/UL
PLATELET # BLD AUTO: 238 10*3/UL (ref 150–450)
PMV BLD AUTO: 8.9 FL (ref 7.4–10.4)
POTASSIUM SERPL-SCNC: 3.4 MMOL/L (ref 3.6–5.1)
RBC # BLD AUTO: 3.3 10*6/UL (ref 4–5.4)
SODIUM SERPL-SCNC: 123 MMOL/L (ref 136–144)
WBC # BLD AUTO: 4.1 10*3/UL (ref 4.5–11.5)

## 2018-06-05 ENCOUNTER — HOSPITAL ENCOUNTER (OUTPATIENT)
Dept: FAMILY MEDICINE CLINIC | Facility: CLINIC | Age: 58
Setting detail: SPECIMEN
Discharge: HOME | End: 2018-06-05
Attending: FAMILY MEDICINE | Admitting: FAMILY MEDICINE

## 2018-06-06 ENCOUNTER — HOSPITAL ENCOUNTER (OUTPATIENT)
Dept: LAB | Facility: HOSPITAL | Age: 58
Discharge: HOME | End: 2018-06-06
Attending: INTERNAL MEDICINE | Admitting: INTERNAL MEDICINE

## 2018-06-06 LAB
ANION GAP SERPL CALC-SCNC: 10.3 MMOL/L (ref 10–20)
BUN SERPL-MCNC: 30 MG/DL (ref 8–20)
BUN/CREAT SERPL: 27.3 (ref 5.4–26.2)
CALCIUM SERPL-MCNC: 9 MG/DL (ref 8.9–10.3)
CHLORIDE SERPL-SCNC: 102 MMOL/L (ref 101–111)
CONV CO2: 26 MMOL/L (ref 22–32)
CREAT UR-MCNC: 1.1 MG/DL (ref 0.4–1)
GLUCOSE SERPL-MCNC: 102 MG/DL (ref 65–99)
POTASSIUM SERPL-SCNC: 4.3 MMOL/L (ref 3.6–5.1)
SODIUM SERPL-SCNC: 134 MMOL/L (ref 136–144)

## 2018-06-13 ENCOUNTER — OFFICE (OUTPATIENT)
Dept: URBAN - METROPOLITAN AREA CLINIC 64 | Facility: CLINIC | Age: 58
End: 2018-06-13

## 2018-06-13 VITALS
DIASTOLIC BLOOD PRESSURE: 88 MMHG | HEART RATE: 83 BPM | HEIGHT: 62 IN | SYSTOLIC BLOOD PRESSURE: 132 MMHG | WEIGHT: 85 LBS

## 2018-06-13 DIAGNOSIS — K94.23 GASTROSTOMY MALFUNCTION: ICD-10-CM

## 2018-06-13 DIAGNOSIS — Z86.010 PERSONAL HISTORY OF COLONIC POLYPS: ICD-10-CM

## 2018-06-13 DIAGNOSIS — R63.3 FEEDING DIFFICULTIES: ICD-10-CM

## 2018-06-13 PROCEDURE — 43760: CPT | Performed by: NURSE PRACTITIONER

## 2018-06-25 ENCOUNTER — HOSPITAL ENCOUNTER (OUTPATIENT)
Dept: PAIN MEDICINE | Facility: HOSPITAL | Age: 58
Discharge: HOME | End: 2018-06-25
Attending: ANESTHESIOLOGY | Admitting: ANESTHESIOLOGY

## 2018-07-13 ENCOUNTER — HOSPITAL ENCOUNTER (OUTPATIENT)
Dept: FAMILY MEDICINE CLINIC | Facility: CLINIC | Age: 58
Setting detail: SPECIMEN
Discharge: HOME | End: 2018-07-13
Attending: FAMILY MEDICINE | Admitting: FAMILY MEDICINE

## 2018-07-13 LAB
ALBUMIN SERPL-MCNC: 4 G/DL (ref 3.5–4.8)
ALBUMIN/GLOB SERPL: 1.1 {RATIO} (ref 1–1.7)
ALP SERPL-CCNC: 95 IU/L (ref 32–91)
ALT SERPL-CCNC: 22 IU/L (ref 14–54)
ANION GAP SERPL CALC-SCNC: 13.5 MMOL/L (ref 10–20)
AST SERPL-CCNC: 26 IU/L (ref 15–41)
BASOPHILS # BLD AUTO: 0 10*3/UL (ref 0–0.2)
BASOPHILS NFR BLD AUTO: 0 % (ref 0–2)
BILIRUB SERPL-MCNC: 0.7 MG/DL (ref 0.3–1.2)
BUN SERPL-MCNC: 28 MG/DL (ref 8–20)
BUN/CREAT SERPL: 23.3 (ref 5.4–26.2)
CALCIUM SERPL-MCNC: 9.5 MG/DL (ref 8.9–10.3)
CHLORIDE SERPL-SCNC: 93 MMOL/L (ref 101–111)
CONV CO2: 25 MMOL/L (ref 22–32)
CONV TOTAL PROTEIN: 7.8 G/DL (ref 6.1–7.9)
CREAT UR-MCNC: 1.2 MG/DL (ref 0.4–1)
DIFFERENTIAL METHOD BLD: (no result)
EOSINOPHIL # BLD AUTO: 0.2 10*3/UL (ref 0–0.3)
EOSINOPHIL # BLD AUTO: 2 % (ref 0–3)
ERYTHROCYTE [DISTWIDTH] IN BLOOD BY AUTOMATED COUNT: 16.7 % (ref 11.5–14.5)
GLOBULIN UR ELPH-MCNC: 3.8 G/DL (ref 2.5–3.8)
GLUCOSE SERPL-MCNC: 97 MG/DL (ref 65–99)
HCT VFR BLD AUTO: 33.1 % (ref 35–49)
HGB BLD-MCNC: 10.9 G/DL (ref 12–15)
LYMPHOCYTES # BLD AUTO: 0.5 10*3/UL (ref 0.8–4.8)
LYMPHOCYTES NFR BLD AUTO: 6 % (ref 18–42)
MCH RBC QN AUTO: 34.2 PG (ref 26–32)
MCHC RBC AUTO-ENTMCNC: 33 G/DL (ref 32–36)
MCV RBC AUTO: 103.6 FL (ref 80–94)
MONOCYTES # BLD AUTO: 0.5 10*3/UL (ref 0.1–1.3)
MONOCYTES NFR BLD AUTO: 6 % (ref 2–11)
NEUTROPHILS # BLD AUTO: 7.6 10*3/UL (ref 2.3–8.6)
NEUTROPHILS NFR BLD AUTO: 86 % (ref 50–75)
NRBC BLD AUTO-RTO: 0 /100{WBCS}
NRBC/RBC NFR BLD MANUAL: 0 10*3/UL
PLATELET # BLD AUTO: 187 10*3/UL (ref 150–450)
PMV BLD AUTO: 8.1 FL (ref 7.4–10.4)
POTASSIUM SERPL-SCNC: 3.5 MMOL/L (ref 3.6–5.1)
RBC # BLD AUTO: 3.19 10*6/UL (ref 4–5.4)
SODIUM SERPL-SCNC: 128 MMOL/L (ref 136–144)
WBC # BLD AUTO: 8.8 10*3/UL (ref 4.5–11.5)

## 2018-07-25 ENCOUNTER — INPATIENT HOSPITAL (OUTPATIENT)
Dept: URBAN - METROPOLITAN AREA HOSPITAL 84 | Facility: HOSPITAL | Age: 58
End: 2018-07-25
Payer: COMMERCIAL

## 2018-07-25 DIAGNOSIS — J18.9 PNEUMONIA, UNSPECIFIED ORGANISM: ICD-10-CM

## 2018-07-25 DIAGNOSIS — R11.2 NAUSEA WITH VOMITING, UNSPECIFIED: ICD-10-CM

## 2018-07-25 DIAGNOSIS — E87.1 HYPO-OSMOLALITY AND HYPONATREMIA: ICD-10-CM

## 2018-07-25 DIAGNOSIS — D72.829 ELEVATED WHITE BLOOD CELL COUNT, UNSPECIFIED: ICD-10-CM

## 2018-07-25 DIAGNOSIS — R13.10 DYSPHAGIA, UNSPECIFIED: ICD-10-CM

## 2018-07-25 DIAGNOSIS — D53.9 NUTRITIONAL ANEMIA, UNSPECIFIED: ICD-10-CM

## 2018-07-25 PROCEDURE — 99221 1ST HOSP IP/OBS SF/LOW 40: CPT | Performed by: NURSE PRACTITIONER

## 2018-08-03 ENCOUNTER — HOSPITAL ENCOUNTER (OUTPATIENT)
Dept: HOME HEALTH SERVICES | Facility: HOME HEALTHCARE | Age: 58
Setting detail: SPECIMEN
Discharge: HOME | End: 2018-08-03
Attending: FAMILY MEDICINE | Admitting: FAMILY MEDICINE

## 2018-08-03 LAB — VANCOMYCIN TROUGH SERPL-MCNC: 18 UG/ML (ref 10–20)

## 2018-08-06 ENCOUNTER — HOSPITAL ENCOUNTER (OUTPATIENT)
Dept: HOME HEALTH SERVICES | Facility: HOME HEALTHCARE | Age: 58
Setting detail: SPECIMEN
Discharge: HOME | End: 2018-08-06
Attending: FAMILY MEDICINE | Admitting: FAMILY MEDICINE

## 2018-08-06 ENCOUNTER — HOSPITAL ENCOUNTER (OUTPATIENT)
Dept: PAIN MEDICINE | Facility: HOSPITAL | Age: 58
Discharge: HOME | End: 2018-08-06
Attending: ANESTHESIOLOGY | Admitting: ANESTHESIOLOGY

## 2018-08-06 LAB
ALBUMIN SERPL-MCNC: 3.6 G/DL (ref 3.5–4.8)
ALBUMIN/GLOB SERPL: 0.8 {RATIO} (ref 1–1.7)
ALP SERPL-CCNC: 71 IU/L (ref 32–91)
ALT SERPL-CCNC: 15 IU/L (ref 14–54)
ANION GAP SERPL CALC-SCNC: 15.4 MMOL/L (ref 10–20)
AST SERPL-CCNC: 32 IU/L (ref 15–41)
BILIRUB SERPL-MCNC: 0.4 MG/DL (ref 0.3–1.2)
BUN SERPL-MCNC: 17 MG/DL (ref 8–20)
BUN/CREAT SERPL: 15.5 (ref 5.4–26.2)
CALCIUM SERPL-MCNC: 9.5 MG/DL (ref 8.9–10.3)
CHLORIDE SERPL-SCNC: 91 MMOL/L (ref 101–111)
CONV ABS BANDS: 2.7 10*3/UL
CONV ABS IMM GRAN: 0.2 10*3/UL
CONV ANISOCYTES: SLIGHT
CONV CO2: 25 MMOL/L (ref 22–32)
CONV MACROCYTES IN BLOOD BY LIGHT MICROSCOPY: SLIGHT
CONV POIKILOCYTOSIS IN BLOOD BY LIGHT MICROSCOPY: SLIGHT
CONV TOTAL PROTEIN: 7.9 G/DL (ref 6.1–7.9)
CONV TOXIC GRANULES IN BLOOD BY LIGHT MICROSCOPY: SLIGHT
CREAT UR-MCNC: 1.1 MG/DL (ref 0.4–1)
DIFFERENTIAL METHOD BLD: (no result)
ERYTHROCYTE [DISTWIDTH] IN BLOOD BY AUTOMATED COUNT: 17.3 % (ref 11.5–14.5)
GLOBULIN UR ELPH-MCNC: 4.3 G/DL (ref 2.5–3.8)
GLUCOSE SERPL-MCNC: 115 MG/DL (ref 65–99)
HCT VFR BLD AUTO: 27.7 % (ref 35–49)
HGB BLD-MCNC: 9.2 G/DL (ref 12–15)
LYMPHOCYTES # BLD AUTO: 0.7 10*3/UL (ref 0.8–4.8)
LYMPHOCYTES NFR BLD AUTO: 7 % (ref 18–42)
MCH RBC QN AUTO: 33.2 PG (ref 26–32)
MCHC RBC AUTO-ENTMCNC: 33.3 G/DL (ref 32–36)
MCV RBC AUTO: 99.8 FL (ref 80–94)
METAMYELOCYTES NFR BLD: 2 %
MONOCYTES # BLD AUTO: 0.7 10*3/UL (ref 0.1–1.3)
MONOCYTES NFR BLD AUTO: 7 % (ref 2–11)
NEUTROPHILS # BLD AUTO: 5.9 10*3/UL (ref 2.3–8.6)
NEUTROPHILS NFR BLD AUTO: 58 % (ref 50–75)
NEUTS BAND NFR BLD MANUAL: 26 % (ref 0–5)
PATHOLOGIST REVIEW: (no result)
PLATELET # BLD AUTO: 537 10*3/UL (ref 150–450)
PMV BLD AUTO: 7.3 FL (ref 7.4–10.4)
POTASSIUM SERPL-SCNC: 4.4 MMOL/L (ref 3.6–5.1)
RBC # BLD AUTO: 2.77 10*6/UL (ref 4–5.4)
SODIUM SERPL-SCNC: 127 MMOL/L (ref 136–144)
VANCOMYCIN TROUGH SERPL-MCNC: 23 UG/ML (ref 10–20)
WBC # BLD AUTO: 10.2 10*3/UL (ref 4.5–11.5)

## 2018-08-23 ENCOUNTER — HOSPITAL ENCOUNTER (OUTPATIENT)
Dept: ORTHOPEDIC SURGERY | Facility: CLINIC | Age: 58
Discharge: HOME | End: 2018-08-23
Attending: ORTHOPAEDIC SURGERY | Admitting: ORTHOPAEDIC SURGERY

## 2018-08-24 ENCOUNTER — HOSPITAL ENCOUNTER (OUTPATIENT)
Dept: CT IMAGING | Facility: HOSPITAL | Age: 58
Discharge: HOME | End: 2018-08-24
Attending: INTERNAL MEDICINE | Admitting: INTERNAL MEDICINE

## 2018-08-24 LAB
ANION GAP SERPL CALC-SCNC: 10.8 MMOL/L (ref 10–20)
BUN SERPL-MCNC: 22 MG/DL (ref 8–20)
BUN/CREAT SERPL: 24.4 (ref 5.4–26.2)
CALCIUM SERPL-MCNC: 9.5 MG/DL (ref 8.9–10.3)
CHLORIDE SERPL-SCNC: 94 MMOL/L (ref 101–111)
CONV CO2: 29 MMOL/L (ref 22–32)
CREAT UR-MCNC: 0.9 MG/DL (ref 0.4–1)
GLUCOSE SERPL-MCNC: 115 MG/DL (ref 65–99)
POTASSIUM SERPL-SCNC: 3.8 MMOL/L (ref 3.6–5.1)
SODIUM SERPL-SCNC: 130 MMOL/L (ref 136–144)

## 2018-09-27 ENCOUNTER — HOSPITAL ENCOUNTER (OUTPATIENT)
Dept: ORTHOPEDIC SURGERY | Facility: CLINIC | Age: 58
Discharge: HOME | End: 2018-09-27
Attending: ORTHOPAEDIC SURGERY | Admitting: ORTHOPAEDIC SURGERY

## 2018-10-01 ENCOUNTER — HOSPITAL ENCOUNTER (OUTPATIENT)
Dept: LAB | Facility: HOSPITAL | Age: 58
Discharge: HOME | End: 2018-10-01
Attending: INTERNAL MEDICINE | Admitting: INTERNAL MEDICINE

## 2018-10-01 LAB
ANION GAP SERPL CALC-SCNC: 13.6 MMOL/L (ref 10–20)
BUN SERPL-MCNC: 15 MG/DL (ref 8–20)
BUN/CREAT SERPL: 13.6 (ref 5.4–26.2)
CALCIUM SERPL-MCNC: 9.1 MG/DL (ref 8.9–10.3)
CHLORIDE SERPL-SCNC: 86 MMOL/L (ref 101–111)
CONV CO2: 24 MMOL/L (ref 22–32)
CREAT UR-MCNC: 1.1 MG/DL (ref 0.4–1)
GLUCOSE SERPL-MCNC: 87 MG/DL (ref 65–99)
POTASSIUM SERPL-SCNC: 3.6 MMOL/L (ref 3.6–5.1)
SODIUM SERPL-SCNC: 120 MMOL/L (ref 136–144)

## 2018-10-03 ENCOUNTER — HOSPITAL ENCOUNTER (OUTPATIENT)
Dept: PAIN MEDICINE | Facility: HOSPITAL | Age: 58
Discharge: HOME | End: 2018-10-03
Attending: ANESTHESIOLOGY | Admitting: ANESTHESIOLOGY

## 2018-10-03 ENCOUNTER — HOSPITAL ENCOUNTER (OUTPATIENT)
Dept: INFUSION THERAPY | Facility: HOSPITAL | Age: 58
Discharge: HOME | End: 2018-10-03
Attending: INTERNAL MEDICINE | Admitting: INTERNAL MEDICINE

## 2018-10-03 LAB
AMPHETAMINES UR QL SCN: NEGATIVE
BARBITURATES UR QL SCN: NEGATIVE
BENZODIAZ UR QL SCN: NEGATIVE
BZE UR QL SCN: NEGATIVE
CREAT 24H UR-MCNC: 63.3 MG/DL
METHADONE UR QL SCN: NEGATIVE
OPIATE CONFIRMATION URINE: NORMAL
OPIATES TESTED UR SCN: NEGATIVE
PCP UR QL: NEGATIVE
THC SERPLBLD CFM-MCNC: NEGATIVE NG/ML

## 2018-10-05 ENCOUNTER — HOSPITAL ENCOUNTER (OUTPATIENT)
Dept: LAB | Facility: HOSPITAL | Age: 58
Discharge: HOME | End: 2018-10-05
Attending: NURSE PRACTITIONER | Admitting: NURSE PRACTITIONER

## 2018-10-05 LAB
ANION GAP SERPL CALC-SCNC: 15 MMOL/L (ref 10–20)
BUN SERPL-MCNC: 14 MG/DL (ref 8–20)
BUN/CREAT SERPL: 14 (ref 5.4–26.2)
CALCIUM SERPL-MCNC: 9.2 MG/DL (ref 8.9–10.3)
CHLORIDE SERPL-SCNC: 92 MMOL/L (ref 101–111)
CONV CO2: 26 MMOL/L (ref 22–32)
CREAT UR-MCNC: 1 MG/DL (ref 0.4–1)
GLUCOSE SERPL-MCNC: 88 MG/DL (ref 65–99)
POTASSIUM SERPL-SCNC: 4 MMOL/L (ref 3.6–5.1)
SODIUM SERPL-SCNC: 129 MMOL/L (ref 136–144)

## 2018-10-18 ENCOUNTER — INPATIENT HOSPITAL (OUTPATIENT)
Dept: URBAN - METROPOLITAN AREA HOSPITAL 84 | Facility: HOSPITAL | Age: 58
End: 2018-10-18
Payer: COMMERCIAL

## 2018-10-18 DIAGNOSIS — I10 ESSENTIAL (PRIMARY) HYPERTENSION: ICD-10-CM

## 2018-10-18 DIAGNOSIS — R13.10 DYSPHAGIA, UNSPECIFIED: ICD-10-CM

## 2018-10-18 DIAGNOSIS — K94.23 GASTROSTOMY MALFUNCTION: ICD-10-CM

## 2018-10-18 DIAGNOSIS — D53.9 NUTRITIONAL ANEMIA, UNSPECIFIED: ICD-10-CM

## 2018-10-18 DIAGNOSIS — C09.9 MALIGNANT NEOPLASM OF TONSIL, UNSPECIFIED: ICD-10-CM

## 2018-10-18 PROCEDURE — 43760: CPT | Performed by: NURSE PRACTITIONER

## 2018-10-18 PROCEDURE — 99221 1ST HOSP IP/OBS SF/LOW 40: CPT | Mod: 25 | Performed by: NURSE PRACTITIONER

## 2018-10-22 ENCOUNTER — HOSPITAL ENCOUNTER (OUTPATIENT)
Dept: HOME HEALTH SERVICES | Facility: HOME HEALTHCARE | Age: 58
Setting detail: SPECIMEN
Discharge: HOME | End: 2018-10-22
Attending: ORTHOPAEDIC SURGERY | Admitting: ORTHOPAEDIC SURGERY

## 2018-10-22 LAB
ALBUMIN SERPL-MCNC: 2.6 G/DL (ref 3.5–4.8)
ALBUMIN/GLOB SERPL: 0.7 {RATIO} (ref 1–1.7)
ALP SERPL-CCNC: 85 IU/L (ref 32–91)
ALT SERPL-CCNC: 22 IU/L (ref 14–54)
ANION GAP SERPL CALC-SCNC: 14.1 MMOL/L (ref 10–20)
AST SERPL-CCNC: 24 IU/L (ref 15–41)
BASOPHILS # BLD AUTO: 0 10*3/UL (ref 0–0.2)
BASOPHILS NFR BLD AUTO: 0 % (ref 0–2)
BILIRUB SERPL-MCNC: 0.9 MG/DL (ref 0.3–1.2)
BUN SERPL-MCNC: 19 MG/DL (ref 8–20)
BUN/CREAT SERPL: 27.1 (ref 5.4–26.2)
CALCIUM SERPL-MCNC: 8.6 MG/DL (ref 8.9–10.3)
CHLORIDE SERPL-SCNC: 91 MMOL/L (ref 101–111)
CONV ANISOCYTES: SLIGHT
CONV CO2: 28 MMOL/L (ref 22–32)
CONV POIKILOCYTOSIS IN BLOOD BY LIGHT MICROSCOPY: SLIGHT
CONV SMEAR REVIEW: (no result)
CONV TOTAL PROTEIN: 6.2 G/DL (ref 6.1–7.9)
CREAT UR-MCNC: 0.7 MG/DL (ref 0.4–1)
CRP SERPL-MCNC: 13.25 MG/DL (ref 0–0.7)
DIFFERENTIAL METHOD BLD: (no result)
EOSINOPHIL # BLD AUTO: 0.1 10*3/UL (ref 0–0.3)
EOSINOPHIL # BLD AUTO: 1 % (ref 0–3)
ERYTHROCYTE [DISTWIDTH] IN BLOOD BY AUTOMATED COUNT: 15.5 % (ref 11.5–14.5)
ERYTHROCYTE [SEDIMENTATION RATE] IN BLOOD BY WESTERGREN METHOD: 112 MM/HR (ref 0–30)
GLOBULIN UR ELPH-MCNC: 3.6 G/DL (ref 2.5–3.8)
GLUCOSE SERPL-MCNC: 91 MG/DL (ref 65–99)
HCT VFR BLD AUTO: 25.7 % (ref 35–49)
HGB BLD-MCNC: 8.8 G/DL (ref 12–15)
LYMPHOCYTES # BLD AUTO: 0.7 10*3/UL (ref 0.8–4.8)
LYMPHOCYTES NFR BLD AUTO: 7 % (ref 18–42)
MCH RBC QN AUTO: 32.7 PG (ref 26–32)
MCHC RBC AUTO-ENTMCNC: 34.3 G/DL (ref 32–36)
MCV RBC AUTO: 95.3 FL (ref 80–94)
MONOCYTES # BLD AUTO: 0.9 10*3/UL (ref 0.1–1.3)
MONOCYTES NFR BLD AUTO: 9 % (ref 2–11)
NEUTROPHILS # BLD AUTO: 8.1 10*3/UL (ref 2.3–8.6)
NEUTROPHILS NFR BLD AUTO: 83 % (ref 50–75)
NRBC BLD AUTO-RTO: 0 /100{WBCS}
PLATELET # BLD AUTO: 460 10*3/UL (ref 150–450)
PMV BLD AUTO: 7 FL (ref 7.4–10.4)
POTASSIUM SERPL-SCNC: 4.1 MMOL/L (ref 3.6–5.1)
RBC # BLD AUTO: 2.7 10*6/UL (ref 4–5.4)
SODIUM SERPL-SCNC: 129 MMOL/L (ref 136–144)
WBC # BLD AUTO: 9.8 10*3/UL (ref 4.5–11.5)

## 2018-12-03 ENCOUNTER — HOSPITAL ENCOUNTER (OUTPATIENT)
Dept: PAIN MEDICINE | Facility: HOSPITAL | Age: 58
Discharge: HOME | End: 2018-12-03
Attending: ANESTHESIOLOGY | Admitting: ANESTHESIOLOGY

## 2018-12-17 ENCOUNTER — HOSPITAL ENCOUNTER (OUTPATIENT)
Dept: LAB | Facility: HOSPITAL | Age: 58
Discharge: HOME | End: 2018-12-17
Attending: INTERNAL MEDICINE | Admitting: INTERNAL MEDICINE

## 2018-12-17 LAB
ANION GAP SERPL CALC-SCNC: 15 MMOL/L (ref 10–20)
BASOPHILS # BLD AUTO: 0 10*3/UL (ref 0–0.2)
BASOPHILS NFR BLD AUTO: 0 % (ref 0–2)
BILIRUB UR QL STRIP: NEGATIVE MG/DL
BUN SERPL-MCNC: 16 MG/DL (ref 8–20)
BUN/CREAT SERPL: 17.8 (ref 5.4–26.2)
CALCIUM SERPL-MCNC: 9.3 MG/DL (ref 8.9–10.3)
CASTS URNS QL MICRO: ABNORMAL /[LPF]
CHLORIDE SERPL-SCNC: 92 MMOL/L (ref 101–111)
COLOR UR: YELLOW
CONV BACTERIA IN URINE MICRO: NEGATIVE
CONV CLARITY OF URINE: CLEAR
CONV CO2: 28 MMOL/L (ref 22–32)
CONV HYALINE CASTS IN URINE MICRO: 2 /[LPF] (ref 0–5)
CONV PROTEIN IN URINE BY AUTOMATED TEST STRIP: NEGATIVE MG/DL
CONV SMALL ROUND CELLS: ABNORMAL /[HPF]
CONV UROBILINOGEN IN URINE BY AUTOMATED TEST STRIP: 0.2 MG/DL
CREAT 24H UR-MCNC: 29.6 MG/DL
CREAT UR-MCNC: 0.9 MG/DL (ref 0.4–1)
CULTURE INDICATED?: ABNORMAL
DIFFERENTIAL METHOD BLD: (no result)
EOSINOPHIL # BLD AUTO: 0.1 10*3/UL (ref 0–0.3)
EOSINOPHIL # BLD AUTO: 2 % (ref 0–3)
ERYTHROCYTE [DISTWIDTH] IN BLOOD BY AUTOMATED COUNT: 18.2 % (ref 11.5–14.5)
GLUCOSE SERPL-MCNC: 120 MG/DL (ref 65–99)
GLUCOSE UR QL: NEGATIVE MG/DL
HCT VFR BLD AUTO: 34.9 % (ref 35–49)
HGB BLD-MCNC: 11.4 G/DL (ref 12–15)
HGB UR QL STRIP: NEGATIVE
KETONES UR QL STRIP: NEGATIVE MG/DL
LEUKOCYTE ESTERASE UR QL STRIP: ABNORMAL
LYMPHOCYTES # BLD AUTO: 0.8 10*3/UL (ref 0.8–4.8)
LYMPHOCYTES NFR BLD AUTO: 11 % (ref 18–42)
MAGNESIUM SERPL-MCNC: 1.7 MG/DL (ref 1.8–2.5)
MCH RBC QN AUTO: 33.4 PG (ref 26–32)
MCHC RBC AUTO-ENTMCNC: 32.5 G/DL (ref 32–36)
MCV RBC AUTO: 102.6 FL (ref 80–94)
MONOCYTES # BLD AUTO: 0.5 10*3/UL (ref 0.1–1.3)
MONOCYTES NFR BLD AUTO: 6 % (ref 2–11)
NEUTROPHILS # BLD AUTO: 5.9 10*3/UL (ref 2.3–8.6)
NEUTROPHILS NFR BLD AUTO: 81 % (ref 50–75)
NITRITE UR QL STRIP: NEGATIVE
NRBC BLD AUTO-RTO: 0 /100{WBCS}
NRBC/RBC NFR BLD MANUAL: 0 10*3/UL
PH UR STRIP.AUTO: 7.5 [PH] (ref 4.5–8)
PHOSPHATE SERPL-MCNC: 3.9 MG/DL (ref 2.4–4.7)
PLATELET # BLD AUTO: 400 10*3/UL (ref 150–450)
PMV BLD AUTO: 7 FL (ref 7.4–10.4)
POTASSIUM SERPL-SCNC: 4 MMOL/L (ref 3.6–5.1)
PROT UR-MCNC: 9 MG/DL
PROT/CREAT UR: 0.3 MG/MG (ref 0–22)
RBC # BLD AUTO: 3.4 10*6/UL (ref 4–5.4)
RBC #/AREA URNS HPF: 1 /[HPF] (ref 0–3)
SODIUM SERPL-SCNC: 131 MMOL/L (ref 136–144)
SP GR UR: 1.01 (ref 1–1.03)
SPERM URNS QL MICRO: ABNORMAL /[HPF]
SQUAMOUS SPT QL MICRO: 2 /[HPF] (ref 0–5)
UNIDENT CRYS URNS QL MICRO: ABNORMAL /[HPF]
WBC # BLD AUTO: 7.3 10*3/UL (ref 4.5–11.5)
WBC #/AREA URNS HPF: 2 /[HPF] (ref 0–5)
YEAST SPEC QL WET PREP: ABNORMAL /[HPF]

## 2018-12-20 ENCOUNTER — HOSPITAL ENCOUNTER (OUTPATIENT)
Dept: PAIN MEDICINE | Facility: HOSPITAL | Age: 58
Discharge: HOME | End: 2018-12-20
Attending: ANESTHESIOLOGY | Admitting: ANESTHESIOLOGY

## 2019-01-01 ENCOUNTER — OFFICE (OUTPATIENT)
Dept: URBAN - METROPOLITAN AREA CLINIC 64 | Facility: CLINIC | Age: 59
End: 2019-01-01

## 2019-01-01 ENCOUNTER — INPATIENT HOSPITAL (OUTPATIENT)
Dept: URBAN - METROPOLITAN AREA HOSPITAL 84 | Facility: HOSPITAL | Age: 59
End: 2019-01-01
Payer: COMMERCIAL

## 2019-01-01 VITALS
SYSTOLIC BLOOD PRESSURE: 94 MMHG | DIASTOLIC BLOOD PRESSURE: 50 MMHG | HEIGHT: 62 IN | HEART RATE: 78 BPM | WEIGHT: 90 LBS

## 2019-01-01 VITALS
HEIGHT: 62 IN | WEIGHT: 90 LBS | HEART RATE: 85 BPM | SYSTOLIC BLOOD PRESSURE: 118 MMHG | DIASTOLIC BLOOD PRESSURE: 70 MMHG

## 2019-01-01 DIAGNOSIS — D50.9 IRON DEFICIENCY ANEMIA, UNSPECIFIED: ICD-10-CM

## 2019-01-01 DIAGNOSIS — Z93.1 GASTROSTOMY STATUS: ICD-10-CM

## 2019-01-01 DIAGNOSIS — Z43.1 ENCOUNTER FOR ATTENTION TO GASTROSTOMY: ICD-10-CM

## 2019-01-01 DIAGNOSIS — Z85.818 PERSONAL HISTORY OF MALIGNANT NEOPLASM OF OTHER SITES OF LIP: ICD-10-CM

## 2019-01-01 DIAGNOSIS — K21.9 GASTRO-ESOPHAGEAL REFLUX DISEASE WITHOUT ESOPHAGITIS: ICD-10-CM

## 2019-01-01 DIAGNOSIS — Z86.010 PERSONAL HISTORY OF COLONIC POLYPS: ICD-10-CM

## 2019-01-01 DIAGNOSIS — D64.89 OTHER SPECIFIED ANEMIAS: ICD-10-CM

## 2019-01-01 DIAGNOSIS — K31.84 GASTROPARESIS: ICD-10-CM

## 2019-01-01 DIAGNOSIS — K94.23 GASTROSTOMY MALFUNCTION: ICD-10-CM

## 2019-01-01 PROCEDURE — 99222 1ST HOSP IP/OBS MODERATE 55: CPT | Performed by: NURSE PRACTITIONER

## 2019-01-01 PROCEDURE — 99231 SBSQ HOSP IP/OBS SF/LOW 25: CPT | Performed by: NURSE PRACTITIONER

## 2019-01-01 PROCEDURE — 43762 RPLC GTUBE NO REVJ TRC: CPT | Performed by: NURSE PRACTITIONER

## 2019-01-17 ENCOUNTER — HOSPITAL ENCOUNTER (OUTPATIENT)
Dept: CT IMAGING | Facility: HOSPITAL | Age: 59
Discharge: HOME | End: 2019-01-17
Attending: INTERNAL MEDICINE | Admitting: INTERNAL MEDICINE

## 2019-01-17 LAB
ANION GAP SERPL CALC-SCNC: 16.2 MMOL/L (ref 10–20)
BILIRUB UR QL STRIP: NEGATIVE MG/DL
BUN SERPL-MCNC: 33 MG/DL (ref 8–20)
BUN/CREAT SERPL: 30 (ref 5.4–26.2)
CALCIUM SERPL-MCNC: 8.8 MG/DL (ref 8.9–10.3)
CASTS URNS QL MICRO: ABNORMAL /[LPF]
CHLORIDE SERPL-SCNC: 89 MMOL/L (ref 101–111)
COLOR UR: YELLOW
CONV BACTERIA IN URINE MICRO: NEGATIVE
CONV CLARITY OF URINE: CLEAR
CONV CO2: 25 MMOL/L (ref 22–32)
CONV HYALINE CASTS IN URINE MICRO: 0 /[LPF] (ref 0–5)
CONV PROTEIN IN URINE BY AUTOMATED TEST STRIP: NEGATIVE MG/DL
CONV SMALL ROUND CELLS: ABNORMAL /[HPF]
CONV UROBILINOGEN IN URINE BY AUTOMATED TEST STRIP: 0.2 MG/DL
CREAT UR-MCNC: 1.1 MG/DL (ref 0.4–1)
CULTURE INDICATED?: ABNORMAL
GLUCOSE SERPL-MCNC: 82 MG/DL (ref 65–99)
GLUCOSE UR QL: NEGATIVE MG/DL
HGB UR QL STRIP: NEGATIVE
KETONES UR QL STRIP: NEGATIVE MG/DL
LEUKOCYTE ESTERASE UR QL STRIP: ABNORMAL
NITRITE UR QL STRIP: NEGATIVE
PH UR STRIP.AUTO: 7 [PH] (ref 4.5–8)
POTASSIUM SERPL-SCNC: 4.2 MMOL/L (ref 3.6–5.1)
RBC #/AREA URNS HPF: 1 /[HPF] (ref 0–3)
SODIUM SERPL-SCNC: 126 MMOL/L (ref 136–144)
SP GR UR: 1.01 (ref 1–1.03)
SPERM URNS QL MICRO: ABNORMAL /[HPF]
SQUAMOUS SPT QL MICRO: 1 /[HPF] (ref 0–5)
UNIDENT CRYS URNS QL MICRO: ABNORMAL /[HPF]
WBC #/AREA URNS HPF: 4 /[HPF] (ref 0–5)
YEAST SPEC QL WET PREP: ABNORMAL /[HPF]

## 2019-01-25 ENCOUNTER — HOSPITAL ENCOUNTER (OUTPATIENT)
Dept: GASTROENTEROLOGY | Facility: HOSPITAL | Age: 59
Setting detail: HOSPITAL OUTPATIENT SURGERY
Discharge: HOME | End: 2019-01-25
Attending: INTERNAL MEDICINE | Admitting: INTERNAL MEDICINE

## 2019-01-25 LAB
AFB STAIN: NORMAL
AMPICILLIN SUSC ISLT: ABNORMAL
AZTREONAM SUSC ISLT: ABNORMAL
B PERT DNA SPEC QL NAA+PROBE: NOT DETECTED
BACTERIA ISLT: ABNORMAL
BACTERIA ISLT: ABNORMAL
BACTERIA SPEC AEROBE CULT: ABNORMAL
BACTERIA SPEC AEROBE CULT: NORMAL
BACTERIA SPEC AEROBE CULT: NORMAL
C PNEUM DNA NPH QL NAA+NON-PROBE: NOT DETECTED
CEFAZOLIN SUSC ISLT: ABNORMAL
CEFEPIME SUSC ISLT: ABNORMAL
CEFTAZIDIME SUSC ISLT: ABNORMAL
CEFTRIAXONE SUSC ISLT: ABNORMAL
CIPROFLOXACIN SUSC ISLT: ABNORMAL
CLINDAMYCIN SUSC ISLT: ABNORMAL
CONCENTRATED SMEAR: NORMAL
CONV DIRECT SMEAR: NORMAL
CONV GRANULOCYTES, FLUID: 93 %
CONV MONOCYTES, FLUID: 1 %
CONV RESPNL SPECIMEN: NORMAL
ERTAPENEM SUSC ISLT: ABNORMAL
ERYTHROMYCIN SUSC ISLT: ABNORMAL
FLUAV H1 2009 PAND RNA NPH QL NAA+PROBE: NOT DETECTED
FLUAV H1 HA GENE NPH QL NAA+PROBE: NOT DETECTED
FLUAV H3 RNA NPH QL NAA+PROBE: NOT DETECTED
FLUAV SUBTYP SPEC NAA+PROBE: NOT DETECTED
FLUBV RNA ISLT QL NAA+PROBE: NOT DETECTED
GRAM STN SPEC: ABNORMAL
HADV DNA SPEC NAA+PROBE: NOT DETECTED
HCOV 229E RNA SPEC QL NAA+PROBE: NOT DETECTED
HCOV HKU1 RNA SPEC QL NAA+PROBE: NOT DETECTED
HCOV NL63 RNA SPEC QL NAA+PROBE: NOT DETECTED
HCOV OC43 RNA SPEC QL NAA+PROBE: NOT DETECTED
HMPV RNA NPH QL NAA+NON-PROBE: NOT DETECTED
HPIV1 RNA ISLT QL NAA+PROBE: NOT DETECTED
HPIV2 RNA SPEC QL NAA+PROBE: NOT DETECTED
HPIV3 RNA NPH QL NAA+PROBE: NOT DETECTED
HPIV4 P GENE NPH QL NAA+PROBE: NOT DETECTED
LEVOFLOXACIN SUSC ISLT: ABNORMAL
Lab: ABNORMAL
Lab: NORMAL
Lab: NORMAL
M PNEUMO IGG SER IA-ACNC: NOT DETECTED
MEROPENEM SUSC ISLT: ABNORMAL
MESOTHL CELL NFR FLD MANUAL: 6 %
MICRO REPORT STATUS: ABNORMAL
MICRO REPORT STATUS: NORMAL
MICRO REPORT STATUS: NORMAL
OXACILLIN SUSC ISLT: ABNORMAL
PIP+TAZO SUSC ISLT: ABNORMAL
RHINOVIRUS RNA SPEC NAA+PROBE: NOT DETECTED
RSV RNA NPH QL NAA+NON-PROBE: NOT DETECTED
SPECIMEN SOURCE: ABNORMAL
SPECIMEN SOURCE: NORMAL
SUSC METH SPEC: ABNORMAL
SUSC METH SPEC: ABNORMAL
TETRACYCLINE SUSC ISLT: ABNORMAL
TETRACYCLINE SUSC ISLT: ABNORMAL
TOBRAMYCIN SUSC ISLT: ABNORMAL
TRIMETHOPRIM/SULFA: ABNORMAL
TRIMETHOPRIM/SULFA: ABNORMAL
VANCOMYCIN SUSC ISLT: ABNORMAL

## 2019-01-26 LAB — SPECIMEN SOURCE: NORMAL

## 2019-01-28 ENCOUNTER — HOSPITAL ENCOUNTER (OUTPATIENT)
Dept: LAB | Facility: HOSPITAL | Age: 59
Discharge: HOME | End: 2019-01-28
Attending: NURSE PRACTITIONER | Admitting: NURSE PRACTITIONER

## 2019-01-28 LAB
ANION GAP SERPL CALC-SCNC: 14 MMOL/L (ref 10–20)
BUN SERPL-MCNC: 20 MG/DL (ref 8–20)
BUN/CREAT SERPL: 22.2 (ref 5.4–26.2)
CALCIUM SERPL-MCNC: 9.4 MG/DL (ref 8.9–10.3)
CHLORIDE SERPL-SCNC: 94 MMOL/L (ref 101–111)
CONV CO2: 24 MMOL/L (ref 22–32)
CREAT UR-MCNC: 0.9 MG/DL (ref 0.4–1)
GLUCOSE SERPL-MCNC: 89 MG/DL (ref 65–99)
POTASSIUM SERPL-SCNC: 5 MMOL/L (ref 3.6–5.1)
SODIUM SERPL-SCNC: 127 MMOL/L (ref 136–144)
SPECIMEN SOURCE: NORMAL

## 2019-01-31 ENCOUNTER — HOSPITAL ENCOUNTER (OUTPATIENT)
Dept: PAIN MEDICINE | Facility: HOSPITAL | Age: 59
Discharge: HOME | End: 2019-01-31
Attending: ANESTHESIOLOGY | Admitting: ANESTHESIOLOGY

## 2019-01-31 LAB
AMPHETAMINES UR QL SCN: NEGATIVE
BARBITURATES UR QL SCN: NEGATIVE
BENZODIAZ UR QL SCN: NEGATIVE
BZE UR QL SCN: NEGATIVE
CREAT 24H UR-MCNC: NORMAL MG/DL
METHADONE UR QL SCN: NEGATIVE
OPIATE CONFIRMATION URINE: NORMAL
OPIATES TESTED UR SCN: NEGATIVE
PCP UR QL: NEGATIVE
THC SERPLBLD CFM-MCNC: NEGATIVE NG/ML

## 2019-03-11 ENCOUNTER — HOSPITAL ENCOUNTER (OUTPATIENT)
Dept: FAMILY MEDICINE CLINIC | Facility: CLINIC | Age: 59
Setting detail: SPECIMEN
Discharge: HOME | End: 2019-03-11
Attending: FAMILY MEDICINE | Admitting: FAMILY MEDICINE

## 2019-03-12 ENCOUNTER — HOSPITAL ENCOUNTER (OUTPATIENT)
Dept: OTHER | Facility: HOSPITAL | Age: 59
Discharge: HOME | End: 2019-03-12
Attending: FAMILY MEDICINE | Admitting: FAMILY MEDICINE

## 2019-03-12 LAB
B PERT DNA SPEC QL NAA+PROBE: NOT DETECTED
C PNEUM DNA NPH QL NAA+NON-PROBE: NOT DETECTED
CONV ABS BANDS: 1 10*3/UL
CONV ABS IMM GRAN: 0.05 10*3/UL
CONV MACROCYTES IN BLOOD BY LIGHT MICROSCOPY: SLIGHT
CONV RESPNL SPECIMEN: NORMAL
CONV TOXIC GRANULES IN BLOOD BY LIGHT MICROSCOPY: SLIGHT
DIFFERENTIAL METHOD BLD: (no result)
ERYTHROCYTE [DISTWIDTH] IN BLOOD BY AUTOMATED COUNT: 13.9 % (ref 11.5–14.5)
FLUAV H1 2009 PAND RNA NPH QL NAA+PROBE: NOT DETECTED
FLUAV H1 HA GENE NPH QL NAA+PROBE: NOT DETECTED
FLUAV H3 RNA NPH QL NAA+PROBE: NOT DETECTED
FLUAV SUBTYP SPEC NAA+PROBE: NOT DETECTED
FLUBV RNA ISLT QL NAA+PROBE: NOT DETECTED
HADV DNA SPEC NAA+PROBE: NOT DETECTED
HCOV 229E RNA SPEC QL NAA+PROBE: NOT DETECTED
HCOV HKU1 RNA SPEC QL NAA+PROBE: NOT DETECTED
HCOV NL63 RNA SPEC QL NAA+PROBE: NOT DETECTED
HCOV OC43 RNA SPEC QL NAA+PROBE: NOT DETECTED
HCT VFR BLD AUTO: 26.8 % (ref 35–49)
HGB BLD-MCNC: 9.1 G/DL (ref 12–15)
HMPV RNA NPH QL NAA+NON-PROBE: NOT DETECTED
HPIV1 RNA ISLT QL NAA+PROBE: NOT DETECTED
HPIV2 RNA SPEC QL NAA+PROBE: NOT DETECTED
HPIV3 RNA NPH QL NAA+PROBE: NOT DETECTED
HPIV4 P GENE NPH QL NAA+PROBE: NOT DETECTED
LYMPHOCYTES # BLD AUTO: 0.4 10*3/UL (ref 0.8–4.8)
LYMPHOCYTES NFR BLD AUTO: 8 % (ref 18–42)
M PNEUMO IGG SER IA-ACNC: NOT DETECTED
MCH RBC QN AUTO: 35.8 PG (ref 26–32)
MCHC RBC AUTO-ENTMCNC: 33.9 G/DL (ref 32–36)
MCV RBC AUTO: 105.8 FL (ref 80–94)
METAMYELOCYTES NFR BLD: 1 %
MONOCYTES # BLD AUTO: 0.2 10*3/UL (ref 0.1–1.3)
MONOCYTES NFR BLD AUTO: 3 % (ref 2–11)
NEUTROPHILS # BLD AUTO: 3.7 10*3/UL (ref 2.3–8.6)
NEUTROPHILS NFR BLD AUTO: 69 % (ref 50–75)
NEUTS BAND NFR BLD MANUAL: 19 % (ref 0–5)
PLATELET # BLD AUTO: 194 10*3/UL (ref 150–450)
PMV BLD AUTO: 8.4 FL (ref 7.4–10.4)
RBC # BLD AUTO: 2.53 10*6/UL (ref 4–5.4)
RHINOVIRUS RNA SPEC NAA+PROBE: NOT DETECTED
RSV RNA NPH QL NAA+NON-PROBE: NOT DETECTED
WBC # BLD AUTO: 5.3 10*3/UL (ref 4.5–11.5)

## 2019-03-28 ENCOUNTER — HOSPITAL ENCOUNTER (OUTPATIENT)
Dept: GENERAL RADIOLOGY | Facility: HOSPITAL | Age: 59
End: 2019-03-28
Attending: FAMILY MEDICINE | Admitting: FAMILY MEDICINE

## 2019-04-18 ENCOUNTER — HOSPITAL ENCOUNTER (OUTPATIENT)
Dept: PAIN MEDICINE | Facility: HOSPITAL | Age: 59
Discharge: HOME | End: 2019-04-18
Attending: ANESTHESIOLOGY | Admitting: ANESTHESIOLOGY

## 2019-04-30 ENCOUNTER — HOSPITAL ENCOUNTER (OUTPATIENT)
Dept: LAB | Facility: HOSPITAL | Age: 59
Discharge: HOME | End: 2019-04-30
Attending: INTERNAL MEDICINE | Admitting: INTERNAL MEDICINE

## 2019-04-30 LAB
ANION GAP SERPL CALC-SCNC: 14.3 MMOL/L (ref 10–20)
BACTERIA SPEC AEROBE CULT: NORMAL
BILIRUB UR QL STRIP: NEGATIVE MG/DL
BUN SERPL-MCNC: 30 MG/DL (ref 8–20)
BUN/CREAT SERPL: 30 (ref 5.4–26.2)
CALCIUM SERPL-MCNC: 9.8 MG/DL (ref 8.9–10.3)
CASTS URNS QL MICRO: ABNORMAL /[LPF]
CHLORIDE SERPL-SCNC: 97 MMOL/L (ref 101–111)
COLONY COUNT: NORMAL
COLOR UR: YELLOW
CONV BACTERIA IN URINE MICRO: NEGATIVE
CONV CLARITY OF URINE: CLEAR
CONV CO2: 26 MMOL/L (ref 22–32)
CONV HYALINE CASTS IN URINE MICRO: 0 /[LPF] (ref 0–5)
CONV PROTEIN IN URINE BY AUTOMATED TEST STRIP: NEGATIVE MG/DL
CONV SMALL ROUND CELLS: ABNORMAL /[HPF]
CONV UROBILINOGEN IN URINE BY AUTOMATED TEST STRIP: 0.2 MG/DL
CREAT UR-MCNC: 1 MG/DL (ref 0.4–1)
CULTURE INDICATED?: ABNORMAL
GLUCOSE SERPL-MCNC: 81 MG/DL (ref 65–99)
GLUCOSE UR QL: NEGATIVE MG/DL
HGB UR QL STRIP: NEGATIVE
KETONES UR QL STRIP: NEGATIVE MG/DL
LEUKOCYTE ESTERASE UR QL STRIP: ABNORMAL
Lab: NORMAL
MICRO REPORT STATUS: NORMAL
NITRITE UR QL STRIP: NEGATIVE
PH UR STRIP.AUTO: 7 [PH] (ref 4.5–8)
POTASSIUM SERPL-SCNC: 4.3 MMOL/L (ref 3.6–5.1)
RBC #/AREA URNS HPF: 1 /[HPF] (ref 0–3)
SODIUM SERPL-SCNC: 133 MMOL/L (ref 136–144)
SP GR UR: 1.02 (ref 1–1.03)
SPECIMEN SOURCE: NORMAL
SPERM URNS QL MICRO: ABNORMAL /[HPF]
SQUAMOUS SPT QL MICRO: 2 /[HPF] (ref 0–5)
UNIDENT CRYS URNS QL MICRO: ABNORMAL /[HPF]
WBC #/AREA URNS HPF: 2 /[HPF] (ref 0–5)
YEAST SPEC QL WET PREP: ABNORMAL /[HPF]

## 2019-06-17 ENCOUNTER — OFFICE VISIT (OUTPATIENT)
Dept: FAMILY MEDICINE CLINIC | Facility: CLINIC | Age: 59
End: 2019-06-17

## 2019-06-17 VITALS
RESPIRATION RATE: 24 BRPM | OXYGEN SATURATION: 93 % | DIASTOLIC BLOOD PRESSURE: 79 MMHG | SYSTOLIC BLOOD PRESSURE: 127 MMHG | WEIGHT: 89.6 LBS | BODY MASS INDEX: 16.49 KG/M2 | HEART RATE: 76 BPM | HEIGHT: 62 IN | TEMPERATURE: 98.6 F

## 2019-06-17 DIAGNOSIS — H61.23 BILATERAL IMPACTED CERUMEN: ICD-10-CM

## 2019-06-17 DIAGNOSIS — R13.14 DYSPHAGIA, PHARYNGOESOPHAGEAL PHASE: ICD-10-CM

## 2019-06-17 DIAGNOSIS — R05.9 COUGH IN ADULT: Primary | ICD-10-CM

## 2019-06-17 DIAGNOSIS — D51.3 OTHER DIETARY VITAMIN B12 DEFICIENCY ANEMIA: ICD-10-CM

## 2019-06-17 DIAGNOSIS — F41.9 ANXIETY: ICD-10-CM

## 2019-06-17 DIAGNOSIS — R52 PAIN: ICD-10-CM

## 2019-06-17 DIAGNOSIS — G40.909 SEIZURE DISORDER (HCC): ICD-10-CM

## 2019-06-17 DIAGNOSIS — K22.10 EROSIVE ESOPHAGITIS: ICD-10-CM

## 2019-06-17 PROBLEM — Z79.899 OTHER LONG TERM (CURRENT) DRUG THERAPY: Status: ACTIVE | Noted: 2017-09-19

## 2019-06-17 PROBLEM — M25.50 POLYARTHRALGIA: Status: ACTIVE | Noted: 2018-08-06

## 2019-06-17 PROBLEM — M79.7 FIBROMYOSITIS: Status: ACTIVE | Noted: 2018-08-06

## 2019-06-17 PROBLEM — M48.061 SPINAL STENOSIS, LUMBAR: Status: ACTIVE | Noted: 2017-09-19

## 2019-06-17 PROBLEM — G89.18 POSTOPERATIVE PAIN: Status: ACTIVE | Noted: 2019-04-18

## 2019-06-17 PROBLEM — E87.1 HYPONATREMIA: Status: ACTIVE | Noted: 2018-07-24

## 2019-06-17 PROBLEM — J18.9 PNEUMONIA: Status: ACTIVE | Noted: 2018-08-06

## 2019-06-17 PROCEDURE — 99214 OFFICE O/P EST MOD 30 MIN: CPT | Performed by: NURSE PRACTITIONER

## 2019-06-17 RX ORDER — CLOPIDOGREL BISULFATE 75 MG/1
TABLET ORAL
COMMUNITY
Start: 2019-05-01 | End: 2019-07-10 | Stop reason: SDUPTHER

## 2019-06-17 RX ORDER — EPINEPHRINE 0.3 MG/.3ML
INJECTION SUBCUTANEOUS
COMMUNITY
Start: 2019-04-01 | End: 2019-07-28

## 2019-06-17 RX ORDER — ACETYLCYSTEINE 200 MG/ML
SOLUTION ORAL; RESPIRATORY (INHALATION)
COMMUNITY
Start: 2019-04-02 | End: 2019-07-28

## 2019-06-17 RX ORDER — ONDANSETRON 4 MG/1
TABLET, ORALLY DISINTEGRATING ORAL
COMMUNITY
Start: 2017-09-01 | End: 2019-07-28

## 2019-06-17 RX ORDER — CHLORHEXIDINE GLUCONATE 0.12 MG/ML
15 RINSE ORAL 2 TIMES DAILY
COMMUNITY
Start: 2019-06-14 | End: 2019-11-25

## 2019-06-17 RX ORDER — SUCRALFATE 1 G/1
1 TABLET ORAL 4 TIMES DAILY
COMMUNITY
Start: 2019-06-03 | End: 2019-11-25

## 2019-06-17 RX ORDER — AMITRIPTYLINE HYDROCHLORIDE 50 MG/1
100 TABLET, FILM COATED ORAL
COMMUNITY
Start: 2019-06-15 | End: 2019-06-17 | Stop reason: SDUPTHER

## 2019-06-17 RX ORDER — AMITRIPTYLINE HYDROCHLORIDE 50 MG/1
100 TABLET, FILM COATED ORAL NIGHTLY
Qty: 180 TABLET | Refills: 1 | Status: SHIPPED | OUTPATIENT
Start: 2019-06-17 | End: 2019-11-14 | Stop reason: SDUPTHER

## 2019-06-17 RX ORDER — LEVETIRACETAM 500 MG/1
TABLET ORAL EVERY 12 HOURS
COMMUNITY
Start: 2017-09-19 | End: 2019-06-19 | Stop reason: SDUPTHER

## 2019-06-17 RX ORDER — HYDRALAZINE HYDROCHLORIDE 25 MG/1
25 TABLET, FILM COATED ORAL 2 TIMES DAILY PRN
COMMUNITY
Start: 2019-04-01

## 2019-06-17 RX ORDER — SODIUM CHLORIDE 0.9 % (FLUSH) 0.9 %
SYRINGE (ML) INJECTION
COMMUNITY
Start: 2019-04-08 | End: 2019-07-28

## 2019-06-17 NOTE — PATIENT INSTRUCTIONS
Continue current medicaitons. F/u 6 months. Can use peroxide one drop each ear several times week to keep cerumen soft.  If cough and respiratory symptoms worsen return.

## 2019-06-17 NOTE — PROGRESS NOTES
Subjective   Sri oBbo is a 58 y.o. female.     History of Present Illness she is wondering if she has pneumonia. She has had cough and no fever but increased secretions. Not short of air.   Pain  She is followed by pain management and taking oxycodone 10 can take up to 4 times a day. Doesn't need it that much. Denies constipation  Anemia B12 deficent she is spivey B12 otc.  Peripheral Vascular disease she is taking plavix 75 mg daily.  Dysphagia, erosive esophagitis -she has Gtubefor feeds and uses zofran as need.   Seizure disorder she is taking her keppra 500 mg once daily no seizures of recent.      The following portions of the patient's history were reviewed and updated as appropriate: allergies, current medications, past family history, past medical history, past social history, past surgical history and problem list.    Review of Systems   Constitutional: Negative for activity change, appetite change and chills.   HENT: Negative for sinus pressure and sore throat.    Eyes: Negative for blurred vision, double vision and discharge.   Respiratory: Positive for cough and chest tightness. Negative for apnea.    Gastrointestinal: Negative for constipation and diarrhea.   Endocrine: Negative for cold intolerance and heat intolerance.   Genitourinary: Negative for dysuria and flank pain.   Musculoskeletal: Positive for back pain.   Skin: Negative for color change.   Neurological: Negative for dizziness, seizures, light-headedness and headache.   Psychiatric/Behavioral: Negative for stress.       Objective   Physical Exam   Constitutional: She is oriented to person, place, and time. She appears well-developed.   HENT:   Head: Normocephalic.   Right Ear: cerumen impaction is present.  Left Ear: An impacted cerumen is present.  Neck: Neck supple.   Abdominal: Soft. Bowel sounds are normal.       Neurological: She is alert and oriented to person, place, and time.   Skin: Skin is dry.   Nursing note and vitals  reviewed.        Assessment/Plan   Cough: she will monitor . Lungs clear.   Cerumen impaction: she is to use peroxide in each ear intermittently to loosen cerumen.  Dysphasia: she is to continue current treatment.   Seizure disorder: she will continue keppra .   Anemia nad B12 deficency. She is to continue her B12 treatment.  Pain : continue current pain meds.   F/u if symptoms worsen. F/u 6 mnths.

## 2019-06-19 RX ORDER — LEVETIRACETAM 500 MG/1
TABLET ORAL
Qty: 180 TABLET | Refills: 0 | Status: SHIPPED | OUTPATIENT
Start: 2019-06-19 | End: 2019-10-01 | Stop reason: SDUPTHER

## 2019-06-23 PROBLEM — H61.23 BILATERAL IMPACTED CERUMEN: Status: ACTIVE | Noted: 2019-06-23

## 2019-06-23 PROBLEM — R05.9 COUGH IN ADULT: Status: ACTIVE | Noted: 2019-06-23

## 2019-07-02 ENCOUNTER — OFFICE VISIT (OUTPATIENT)
Dept: PAIN MEDICINE | Facility: CLINIC | Age: 59
End: 2019-07-02

## 2019-07-02 VITALS
DIASTOLIC BLOOD PRESSURE: 83 MMHG | RESPIRATION RATE: 16 BRPM | TEMPERATURE: 97.6 F | OXYGEN SATURATION: 98 % | BODY MASS INDEX: 16.38 KG/M2 | HEIGHT: 62 IN | HEART RATE: 86 BPM | SYSTOLIC BLOOD PRESSURE: 129 MMHG | WEIGHT: 89 LBS

## 2019-07-02 DIAGNOSIS — G89.3 CHRONIC PAIN DUE TO NEOPLASM: ICD-10-CM

## 2019-07-02 DIAGNOSIS — F19.90 CURRENT DRUG USE: Primary | ICD-10-CM

## 2019-07-02 DIAGNOSIS — M54.16 LUMBAR RADICULOPATHY: ICD-10-CM

## 2019-07-02 DIAGNOSIS — M25.50 POLYARTHRALGIA: ICD-10-CM

## 2019-07-02 DIAGNOSIS — M47.817 LUMBOSACRAL SPONDYLOSIS WITHOUT MYELOPATHY: Primary | ICD-10-CM

## 2019-07-02 PROCEDURE — 99214 OFFICE O/P EST MOD 30 MIN: CPT | Performed by: ANESTHESIOLOGY

## 2019-07-02 PROCEDURE — G0463 HOSPITAL OUTPT CLINIC VISIT: HCPCS | Performed by: ANESTHESIOLOGY

## 2019-07-02 RX ORDER — OXYCODONE HYDROCHLORIDE 10 MG/1
10 TABLET ORAL 4 TIMES DAILY PRN
Qty: 120 TABLET | Refills: 0 | Status: SHIPPED | OUTPATIENT
Start: 2019-07-02 | End: 2019-07-02 | Stop reason: SDUPTHER

## 2019-07-02 RX ORDER — OXYCODONE HYDROCHLORIDE 10 MG/1
10 TABLET ORAL 4 TIMES DAILY PRN
Qty: 120 TABLET | Refills: 0 | Status: SHIPPED | OUTPATIENT
Start: 2019-07-02 | End: 2019-09-27 | Stop reason: SDUPTHER

## 2019-07-02 RX ORDER — OXYCODONE HYDROCHLORIDE 10 MG/1
TABLET ORAL
COMMUNITY
Start: 2019-06-01 | End: 2019-07-02 | Stop reason: SDUPTHER

## 2019-07-02 RX ORDER — CARVEDILOL 12.5 MG/1
TABLET ORAL
COMMUNITY
Start: 2019-06-03 | End: 2019-07-11 | Stop reason: SDUPTHER

## 2019-07-02 RX ORDER — IPRATROPIUM BROMIDE AND ALBUTEROL SULFATE 2.5; .5 MG/3ML; MG/3ML
SOLUTION RESPIRATORY (INHALATION)
COMMUNITY
Start: 2019-04-01 | End: 2019-07-28

## 2019-07-02 NOTE — PROGRESS NOTES
Subjective    CC multiple joint pain, muscle pain, back pain, right leg pain    Sri Bobo is a 59 y.o. female with tonsillar cancer/erosive esophagitis/dysphagia S/p chemo/radiation, in remission,  R femur Fx, S/P ORIF, chronic back pain here follow up.  Chronic low back pain radiating to bilateral hips bilateral lower extremity, exacerbated by activity and relieved by rest.  Denies new injury, saddle anesthesia, bladder or bowel incontinence.   Chronic generalized myofascial pain and polyarthralgia.   Worsening right lower extremity radicular pain.  With repeat LESI.      Utilizes oxycodone with  mild relief functional benefit denies side effects.    Takes medication/ nutrition per tube  Good releif with LESi      L-spine x-ray 2017: Anterolisthesis of L4 on L5, facet arthropathy in the lower lumbar spine. Degenerative changes L5-S1.  Fracture deformity proximal sacrum that may relate to an insufficiency fracture.  This is suggested on prior exam.  L-spine MRI 2017: Bilateral L4 pars defects with 11 mm of anterolisthesis of L4 on L5 resulting in moderate bilateral neural foraminal narrowing, right greater than left and moderate spinal canal stenosis. Multilevel degenerative disc disease and degenerative facet changes detailed above with only mild spinal canal stenosis at the L2/3.    Pain Assessment   Location of Pain: Lower Back, R Hip, L Hip, Pancho Leg, neck pain, joint  Description of Pain: Dull/Aching, Throbbing, Stabbing  Previous Pain Rating :7  Current Pain Ratin  Aggravating Factors: Activity  Alleviating Factors: Rest, Medication    The following portions of the patient's history were reviewed and updated as appropriate: allergies, current medications, past family history, past medical history, past social history, past surgical history and problem list.    Review of Systems   Constitutional: Negative for chills, fatigue and fever.   HENT: Negative for trouble swallowing.    Respiratory:  Negative.  Negative for shortness of breath.    Cardiovascular: Negative for chest pain, palpitations and leg swelling.   Gastrointestinal: Negative for nausea and vomiting.   Musculoskeletal: Positive for arthralgias, back pain and gait problem. Negative for joint swelling, myalgias, neck pain and neck stiffness.   Skin: Negative for color change, dry skin, rash and skin lesions.   Neurological: Negative for dizziness, weakness, light-headedness and headache.   Hematological: Does not bruise/bleed easily.   Psychiatric/Behavioral: Negative for behavioral problems, suicidal ideas and depressed mood.   All other systems reviewed and are negative.      Objective   Physical Exam   Constitutional: She is oriented to person, place, and time. She appears well-developed. She has a sickly appearance. No distress.   Eyes: Conjunctivae are normal. Pupils are equal, round, and reactive to light.   Neck: Normal range of motion and full passive range of motion without pain. No Kernig's sign noted.   Cardiovascular: Normal heart sounds and intact distal pulses.   Pulmonary/Chest: Effort normal and breath sounds normal.   Musculoskeletal:        Right hip: She exhibits tenderness.        Lumbar back: She exhibits decreased range of motion, tenderness and spasm.   Back: Paraspinal tenderness, positive leg raise on the right.  Pain with extension/side rotation.      Vascular Status -  Her right foot exhibits no edema. Her left foot exhibits no edema.  Lymphadenopathy:     She has no cervical adenopathy.   Neurological: She is alert and oriented to person, place, and time. She has normal strength. No sensory deficit. Gait abnormal. Coordination normal.   Reflex Scores:       Bicep reflexes are 1+ on the right side and 1+ on the left side.       Brachioradialis reflexes are 1+ on the right side and 1+ on the left side.       Patellar reflexes are 1+ on the right side and 1+ on the left side.       Achilles reflexes are 1+ on the right  "side and 1+ on the left side.  Antalgic gait/cane   Skin: Skin is warm and dry. Capillary refill takes less than 2 seconds.   Psychiatric: She has a normal mood and affect. Her behavior is normal.   Vitals reviewed.    /83   Pulse 86   Temp 97.6 °F (36.4 °C)   Resp 16   Ht 157.5 cm (62\")   Wt 40.4 kg (89 lb)   SpO2 98%   BMI 16.28 kg/m²     Global pain scale and PHQ 9 on chart  Opioid risk tool low risk    Assessment/Plan   Sri was seen today for back pain, hip pain and oxycodone.    Diagnoses and all orders for this visit:    Lumbosacral spondylosis without myelopathy    Lumbar radiculopathy  -     Ambulatory Referral to Pain Management    Chronic pain due to neoplasm    Polyarthralgia    Other orders  -     Discontinue: oxyCODONE (ROXICODONE) 10 MG tablet; Take 1 tablet by mouth 4 (Four) Times a Day As Needed for Moderate Pain .  -     Discontinue: oxyCODONE (ROXICODONE) 10 MG tablet; Take 1 tablet by mouth 4 (Four) Times a Day As Needed for Moderate Pain .  -     oxyCODONE (ROXICODONE) 10 MG tablet; Take 1 tablet by mouth 4 (Four) Times a Day As Needed for Moderate Pain .    Summary    59-year-old female with tonsillar cancer/erosive esophagitis/dysphagia S/p chemo/radiation, in remission, fall S/P RLE ORIF,  chronic back pain  seen in follow-up.  Chronic  malignant pain and chronic back pain from lumbar DDD / spondylosis /stenosis with radiculitis.  Worsening right lower extremity radicular pain.  Will scheduled for repeat LESI.  Needs to be off Plavix 7 days prior to procedure.      Continue oxycodone 10 mg 4 times daily as needed for severe pain.  UDS and Inspect reviewed  Discussed risk of tolerance, dependence, respiratory depression, coma and death associated with use of oral opioids for treatment of chronic nonmalignant pain.      RTC in 3 mo.or for procedure             "

## 2019-07-10 RX ORDER — CLOPIDOGREL BISULFATE 75 MG/1
TABLET ORAL
Qty: 90 TABLET | Refills: 0 | Status: SHIPPED | OUTPATIENT
Start: 2019-07-10 | End: 2019-10-01 | Stop reason: SDUPTHER

## 2019-07-11 RX ORDER — CARVEDILOL 12.5 MG/1
12.5 TABLET ORAL 2 TIMES DAILY WITH MEALS
Qty: 60 TABLET | Refills: 2 | Status: SHIPPED | OUTPATIENT
Start: 2019-07-11 | End: 2019-11-22 | Stop reason: SDUPTHER

## 2019-07-18 ENCOUNTER — HOSPITAL ENCOUNTER (OUTPATIENT)
Dept: PAIN MEDICINE | Facility: HOSPITAL | Age: 59
Discharge: HOME | End: 2019-07-18
Admitting: FAMILY MEDICINE

## 2019-07-18 ENCOUNTER — HOSPITAL ENCOUNTER (OUTPATIENT)
Dept: GENERAL RADIOLOGY | Facility: HOSPITAL | Age: 59
Discharge: HOME | End: 2019-07-18

## 2019-07-18 VITALS
HEIGHT: 62 IN | RESPIRATION RATE: 16 BRPM | TEMPERATURE: 98 F | BODY MASS INDEX: 16.56 KG/M2 | WEIGHT: 90 LBS | SYSTOLIC BLOOD PRESSURE: 163 MMHG | OXYGEN SATURATION: 94 % | DIASTOLIC BLOOD PRESSURE: 85 MMHG | HEART RATE: 84 BPM

## 2019-07-18 DIAGNOSIS — M54.50 LOWER BACK PAIN: ICD-10-CM

## 2019-07-18 DIAGNOSIS — M54.16 LUMBAR RADICULITIS: Primary | ICD-10-CM

## 2019-07-18 PROCEDURE — 77003 FLUOROGUIDE FOR SPINE INJECT: CPT

## 2019-07-18 PROCEDURE — 62323 NJX INTERLAMINAR LMBR/SAC: CPT | Performed by: ANESTHESIOLOGY

## 2019-07-18 PROCEDURE — 25010000002 METHYLPREDNISOLONE PER 40 MG

## 2019-07-18 RX ORDER — METHYLPREDNISOLONE ACETATE 40 MG/ML
INJECTION, SUSPENSION INTRA-ARTICULAR; INTRALESIONAL; INTRAMUSCULAR; SOFT TISSUE
Status: DISPENSED
Start: 2019-07-18 | End: 2019-07-19

## 2019-07-18 RX ORDER — LORAZEPAM 0.5 MG/1
0.5 TABLET ORAL
COMMUNITY
End: 2019-07-28

## 2019-07-18 RX ORDER — OXYCODONE AND ACETAMINOPHEN 10; 325 MG/1; MG/1
1 TABLET ORAL
COMMUNITY
End: 2019-07-28

## 2019-07-18 NOTE — PATIENT INSTRUCTIONS
Epidural Steroid Injection, Care After  Refer to this sheet in the next few weeks. These instructions provide you with information about caring for yourself after your procedure. Your health care provider may also give you more specific instructions. Your treatment has been planned according to current medical practices, but problems sometimes occur. Call your health care provider if you have any problems or questions after your procedure.  What can I expect after the procedure?  After your procedure, it is common to feel a little discomfort at the injection site.  Follow these instructions at home:  · For 24 hours after the procedure:  ? Avoid using heat on the injection site.  ? Do not take a tub bath, and do not soak in water.  ? Do not drive if you received a medicine to help you relax (sedative).  · If directed, put ice on the injection site:  ? Put ice in a plastic bag.  ? Place a towel between your skin and the bag.  ? Leave the ice on for 20 minutes, 2-3 times a day.  · Return to your normal activities as told by your health care provider. Ask your health care provider what activities are safe for you.  · You may remove the bandage (dressing) after 24 hours.  · Take over-the-counter and prescription medicines only as told by your health care provider.  · Keep all follow-up visits as told by your health care provider. This is important.  Contact a health care provider if:  · You have a fever.  · You continue to have pain and soreness around the injection site, even after taking over-the-counter pain medicine.  · You have severe, sudden, or lasting nausea or vomiting.  Get help right away if:  · You have severe pain at the injection site that is not relieved by medicines.  · You develop a severe headache or a stiff neck.  · You become sensitive to light.  · You have any new numbness or weakness in your legs or arms.  · You lose control of your bladder or bowel movements.  · You have trouble breathing.  This  information is not intended to replace advice given to you by your health care provider. Make sure you discuss any questions you have with your health care provider.  Document Released: 04/03/2012 Document Revised: 05/25/2017 Document Reviewed: 04/04/2017  [employer] Interactive Patient Education © 2019 [employer]

## 2019-07-18 NOTE — PROCEDURES
"Subjective    Cc back/RLE pain  Sri Bobo is a 59 y.o. female with lumbar radiculitis here for repeat LESI.  Off Plavix 7 days.     Pain Assessment   Location of Pain: Lower Back, R Hip, L Hip, L Leg, neck pain, joint  Description of Pain: Dull/Aching, Throbbing, Stabbing  Previous Pain Rating :8  Current Pain Ratin  Aggravating Factors: Activity  Alleviating Factors: Rest, Medication    The following portions of the patient's history were reviewed and updated as appropriate: allergies, current medications, past family history, past medical history, past social history, past surgical history and problem list.    Review of Systems  As in HPI  Objective   Physical Exam   Constitutional: She is oriented to person, place, and time. She appears well-developed. No distress.   Cardiovascular: Normal rate.   Pulmonary/Chest: Effort normal.   Musculoskeletal:        Lumbar back: She exhibits decreased range of motion and tenderness.   Neurological: She is alert and oriented to person, place, and time.   Psychiatric: She has a normal mood and affect.     /85 (BP Location: Left arm, Patient Position: Sitting)   Pulse 84   Temp 98 °F (36.7 °C)   Resp 16   Ht 157.5 cm (62\")   Wt 40.8 kg (90 lb)   SpO2 94%   BMI 16.46 kg/m²         Assessment/Plan       Underwent repeat LESI.  RTC 2 to 3 months  DATE OF PROCEDURE: 2019    PREOPERATIVE DIAGNOSIS: lumbar DDD/radiculitis    POSTOPERATIVE DIAGNOSIS: same    PROCEDURE PERFORMED:  lumbar Epidural Steroid Injection    The patient presents with a history of   lumbar degenerative disc disease with  radiculitis. The patient presents today for a  lumbar epidural steroid injection at level  L5/S1.   The patient was seen in the preoperative area.  Patient's consent was obtained and updated.  Vitals were taken.  Patient was then brought to the procedure suite and placed in a prone position. The appropriate anatomic area was widely prepped with Chloraprep and " draped in a sterile fashion. Noninvasive monitoring per routine anesthesia protocol was placed.  Under fluoroscopic guidance using  AP view a 20 gauge styleted tuohy needle was passed through skin anesthetized with 1% Lidocaine without epinephrine.  The needle was advanced using the continuous loss of resistance to saline technique into the L5 epidural space. Needle tip placement in the epidural space was confirmed by loss of resistance and injection of 1 mL of  preservative free contrast.  Following this 8 mL of a solution containing  1 mL of 40 mg Depo-Medrol and 7 mL of preservative-free saline was carefully administered in the epidural space.  A sterile dressing was placed over the puncture site.    The patient tolerated the procedure with no complications . They were then brought to the post procedure area where they recovered nicely.

## 2019-07-28 ENCOUNTER — APPOINTMENT (OUTPATIENT)
Dept: CT IMAGING | Facility: HOSPITAL | Age: 59
End: 2019-07-28

## 2019-07-28 ENCOUNTER — HOSPITAL ENCOUNTER (INPATIENT)
Facility: HOSPITAL | Age: 59
LOS: 3 days | Discharge: HOME | End: 2019-07-31
Attending: EMERGENCY MEDICINE | Admitting: FAMILY MEDICINE

## 2019-07-28 ENCOUNTER — APPOINTMENT (OUTPATIENT)
Dept: GENERAL RADIOLOGY | Facility: HOSPITAL | Age: 59
End: 2019-07-28

## 2019-07-28 DIAGNOSIS — J98.11 MILD BASILAR ATELECTASIS OF BOTH LUNGS: ICD-10-CM

## 2019-07-28 DIAGNOSIS — I95.1 ORTHOSTATIC HYPOTENSION: ICD-10-CM

## 2019-07-28 DIAGNOSIS — D64.9 ANEMIA, UNSPECIFIED TYPE: ICD-10-CM

## 2019-07-28 DIAGNOSIS — E83.42 LOW SERUM MAGNESIUM LEVEL: ICD-10-CM

## 2019-07-28 DIAGNOSIS — E87.1 HYPONATREMIA: ICD-10-CM

## 2019-07-28 DIAGNOSIS — S01.01XA SCALP LACERATION, INITIAL ENCOUNTER: Primary | ICD-10-CM

## 2019-07-28 PROBLEM — R55 SYNCOPE: Status: RESOLVED | Noted: 2019-07-28 | Resolved: 2019-07-28

## 2019-07-28 PROBLEM — J18.9 PNEUMONIA: Status: RESOLVED | Noted: 2018-08-06 | Resolved: 2019-07-28

## 2019-07-28 PROBLEM — R55 SYNCOPE: Status: ACTIVE | Noted: 2019-07-28

## 2019-07-28 PROBLEM — H61.23 BILATERAL IMPACTED CERUMEN: Status: RESOLVED | Noted: 2019-06-23 | Resolved: 2019-07-28

## 2019-07-28 PROBLEM — R05.9 COUGH IN ADULT: Status: RESOLVED | Noted: 2019-06-23 | Resolved: 2019-07-28

## 2019-07-28 PROBLEM — R42 DIZZINESS: Status: ACTIVE | Noted: 2019-07-28

## 2019-07-28 PROBLEM — Z79.899 OTHER LONG TERM (CURRENT) DRUG THERAPY: Status: RESOLVED | Noted: 2017-09-19 | Resolved: 2019-07-28

## 2019-07-28 PROBLEM — G89.18 POSTOPERATIVE PAIN: Status: RESOLVED | Noted: 2019-04-18 | Resolved: 2019-07-28

## 2019-07-28 LAB
ALBUMIN SERPL-MCNC: 3.5 G/DL (ref 3.5–4.8)
ALBUMIN/GLOB SERPL: 1 G/DL (ref 1–1.7)
ALP SERPL-CCNC: 88 U/L (ref 32–91)
ALT SERPL W P-5'-P-CCNC: 16 U/L (ref 14–54)
ANION GAP SERPL CALCULATED.3IONS-SCNC: 15.8 MMOL/L (ref 5–15)
AST SERPL-CCNC: 18 U/L (ref 15–41)
BASOPHILS # BLD AUTO: 0 10*3/MM3 (ref 0–0.2)
BASOPHILS NFR BLD AUTO: 0.4 % (ref 0–1.5)
BILIRUB SERPL-MCNC: 0.8 MG/DL (ref 0.3–1.2)
BILIRUB UR QL STRIP: NEGATIVE
BUN BLD-MCNC: 20 MG/DL (ref 8–20)
BUN/CREAT SERPL: 16.7 (ref 5.4–26.2)
CALCIUM SPEC-SCNC: 8.5 MG/DL (ref 8.9–10.3)
CHLORIDE SERPL-SCNC: 86 MMOL/L (ref 101–111)
CLARITY UR: CLEAR
CO2 SERPL-SCNC: 22 MMOL/L (ref 22–32)
COLOR UR: YELLOW
CREAT BLD-MCNC: 1.2 MG/DL (ref 0.4–1)
D-LACTATE SERPL-SCNC: 0.9 MMOL/L (ref 0.5–2)
DEPRECATED RDW RBC AUTO: 60.8 FL (ref 37–54)
EOSINOPHIL # BLD AUTO: 0.2 10*3/MM3 (ref 0–0.4)
EOSINOPHIL NFR BLD AUTO: 4.2 % (ref 0.3–6.2)
ERYTHROCYTE [DISTWIDTH] IN BLOOD BY AUTOMATED COUNT: 17.8 % (ref 12.3–15.4)
GFR SERPL CREATININE-BSD FRML MDRD: 46 ML/MIN/1.73
GLOBULIN UR ELPH-MCNC: 3.5 GM/DL (ref 2.5–3.8)
GLUCOSE BLD-MCNC: 104 MG/DL (ref 65–99)
GLUCOSE UR STRIP-MCNC: NEGATIVE MG/DL
HCT VFR BLD AUTO: 25.6 % (ref 34–46.6)
HGB BLD-MCNC: 8.7 G/DL (ref 12–15.9)
HGB UR QL STRIP.AUTO: NEGATIVE
KETONES UR QL STRIP: NEGATIVE
LEUKOCYTE ESTERASE UR QL STRIP.AUTO: NEGATIVE
LYMPHOCYTES # BLD AUTO: 0.7 10*3/MM3 (ref 0.7–3.1)
LYMPHOCYTES NFR BLD AUTO: 12.7 % (ref 19.6–45.3)
MAGNESIUM SERPL-MCNC: 1.6 MG/DL (ref 1.8–2.5)
MCH RBC QN AUTO: 32.7 PG (ref 26.6–33)
MCHC RBC AUTO-ENTMCNC: 33.9 G/DL (ref 31.5–35.7)
MCV RBC AUTO: 96.6 FL (ref 79–97)
MONOCYTES # BLD AUTO: 0.6 10*3/MM3 (ref 0.1–0.9)
MONOCYTES NFR BLD AUTO: 10.7 % (ref 5–12)
NEUTROPHILS # BLD AUTO: 4 10*3/MM3 (ref 1.7–7)
NEUTROPHILS NFR BLD AUTO: 72 % (ref 42.7–76)
NITRITE UR QL STRIP: NEGATIVE
NRBC BLD AUTO-RTO: 0 /100 WBC (ref 0–0.2)
PH UR STRIP.AUTO: 6.5 [PH] (ref 5–8)
PLATELET # BLD AUTO: 322 10*3/MM3 (ref 140–450)
PMV BLD AUTO: 6.9 FL (ref 6–12)
POTASSIUM BLD-SCNC: 3.8 MMOL/L (ref 3.6–5.1)
PROT SERPL-MCNC: 7 G/DL (ref 6.1–7.9)
PROT UR QL STRIP: NEGATIVE
RBC # BLD AUTO: 2.65 10*6/MM3 (ref 3.77–5.28)
SODIUM BLD-SCNC: 120 MMOL/L (ref 136–144)
SP GR UR STRIP: 1.01 (ref 1–1.03)
TROPONIN I SERPL-MCNC: <0.03 NG/ML (ref 0–0.03)
UROBILINOGEN UR QL STRIP: NORMAL
WBC NRBC COR # BLD: 5.6 10*3/MM3 (ref 3.4–10.8)

## 2019-07-28 PROCEDURE — P9612 CATHETERIZE FOR URINE SPEC: HCPCS

## 2019-07-28 PROCEDURE — 25010000002 MAGNESIUM SULFATE 2 GM/50ML SOLUTION: Performed by: EMERGENCY MEDICINE

## 2019-07-28 PROCEDURE — 99222 1ST HOSP IP/OBS MODERATE 55: CPT | Performed by: FAMILY MEDICINE

## 2019-07-28 PROCEDURE — 85025 COMPLETE CBC W/AUTO DIFF WBC: CPT | Performed by: EMERGENCY MEDICINE

## 2019-07-28 PROCEDURE — 36415 COLL VENOUS BLD VENIPUNCTURE: CPT

## 2019-07-28 PROCEDURE — 99285 EMERGENCY DEPT VISIT HI MDM: CPT

## 2019-07-28 PROCEDURE — 81003 URINALYSIS AUTO W/O SCOPE: CPT | Performed by: EMERGENCY MEDICINE

## 2019-07-28 PROCEDURE — 70450 CT HEAD/BRAIN W/O DYE: CPT

## 2019-07-28 PROCEDURE — G0378 HOSPITAL OBSERVATION PER HR: HCPCS

## 2019-07-28 PROCEDURE — 25010000002 ONDANSETRON PER 1 MG: Performed by: EMERGENCY MEDICINE

## 2019-07-28 PROCEDURE — 71045 X-RAY EXAM CHEST 1 VIEW: CPT

## 2019-07-28 PROCEDURE — 83605 ASSAY OF LACTIC ACID: CPT

## 2019-07-28 PROCEDURE — 80053 COMPREHEN METABOLIC PANEL: CPT | Performed by: EMERGENCY MEDICINE

## 2019-07-28 PROCEDURE — 84484 ASSAY OF TROPONIN QUANT: CPT | Performed by: EMERGENCY MEDICINE

## 2019-07-28 PROCEDURE — 83735 ASSAY OF MAGNESIUM: CPT | Performed by: EMERGENCY MEDICINE

## 2019-07-28 PROCEDURE — 87040 BLOOD CULTURE FOR BACTERIA: CPT | Performed by: EMERGENCY MEDICINE

## 2019-07-28 RX ORDER — ACETAMINOPHEN 325 MG/1
650 TABLET ORAL EVERY 4 HOURS PRN
Status: DISCONTINUED | OUTPATIENT
Start: 2019-07-28 | End: 2019-07-31 | Stop reason: HOSPADM

## 2019-07-28 RX ORDER — FAMOTIDINE 20 MG/1
40 TABLET, FILM COATED ORAL DAILY
Status: DISCONTINUED | OUTPATIENT
Start: 2019-07-29 | End: 2019-07-31 | Stop reason: HOSPADM

## 2019-07-28 RX ORDER — SODIUM CHLORIDE 0.9 % (FLUSH) 0.9 %
3 SYRINGE (ML) INJECTION EVERY 12 HOURS SCHEDULED
Status: DISCONTINUED | OUTPATIENT
Start: 2019-07-29 | End: 2019-07-31 | Stop reason: HOSPADM

## 2019-07-28 RX ORDER — SODIUM CHLORIDE 9 MG/ML
75 INJECTION, SOLUTION INTRAVENOUS CONTINUOUS
Status: DISCONTINUED | OUTPATIENT
Start: 2019-07-29 | End: 2019-07-30

## 2019-07-28 RX ORDER — HYDRALAZINE HYDROCHLORIDE 25 MG/1
25 TABLET, FILM COATED ORAL 2 TIMES DAILY
Status: DISCONTINUED | OUTPATIENT
Start: 2019-07-29 | End: 2019-07-31 | Stop reason: HOSPADM

## 2019-07-28 RX ORDER — CLOPIDOGREL BISULFATE 75 MG/1
75 TABLET ORAL DAILY
Status: DISCONTINUED | OUTPATIENT
Start: 2019-07-29 | End: 2019-07-29

## 2019-07-28 RX ORDER — OXYCODONE HYDROCHLORIDE 5 MG/1
10 TABLET ORAL 4 TIMES DAILY PRN
Status: DISCONTINUED | OUTPATIENT
Start: 2019-07-28 | End: 2019-07-31 | Stop reason: HOSPADM

## 2019-07-28 RX ORDER — SUCRALFATE 1 G/1
1 TABLET ORAL 4 TIMES DAILY
Status: DISCONTINUED | OUTPATIENT
Start: 2019-07-29 | End: 2019-07-28

## 2019-07-28 RX ORDER — SUCRALFATE 1 G/1
1 TABLET ORAL
Status: DISCONTINUED | OUTPATIENT
Start: 2019-07-29 | End: 2019-07-31 | Stop reason: HOSPADM

## 2019-07-28 RX ORDER — LEVETIRACETAM 500 MG/1
500 TABLET ORAL 2 TIMES DAILY
Status: DISCONTINUED | OUTPATIENT
Start: 2019-07-29 | End: 2019-07-31 | Stop reason: HOSPADM

## 2019-07-28 RX ORDER — MAGNESIUM SULFATE HEPTAHYDRATE 40 MG/ML
2 INJECTION, SOLUTION INTRAVENOUS ONCE
Status: COMPLETED | OUTPATIENT
Start: 2019-07-28 | End: 2019-07-28

## 2019-07-28 RX ORDER — ONDANSETRON 2 MG/ML
8 INJECTION INTRAMUSCULAR; INTRAVENOUS ONCE
Status: COMPLETED | OUTPATIENT
Start: 2019-07-28 | End: 2019-07-28

## 2019-07-28 RX ORDER — ONDANSETRON 2 MG/ML
4 INJECTION INTRAMUSCULAR; INTRAVENOUS EVERY 6 HOURS PRN
Status: DISCONTINUED | OUTPATIENT
Start: 2019-07-28 | End: 2019-07-31 | Stop reason: HOSPADM

## 2019-07-28 RX ORDER — SODIUM CHLORIDE 0.9 % (FLUSH) 0.9 %
3-10 SYRINGE (ML) INJECTION AS NEEDED
Status: DISCONTINUED | OUTPATIENT
Start: 2019-07-28 | End: 2019-07-31 | Stop reason: HOSPADM

## 2019-07-28 RX ORDER — CHLORHEXIDINE GLUCONATE 0.12 MG/ML
15 RINSE ORAL 2 TIMES DAILY
Status: DISCONTINUED | OUTPATIENT
Start: 2019-07-29 | End: 2019-07-31 | Stop reason: HOSPADM

## 2019-07-28 RX ORDER — OXYCODONE HYDROCHLORIDE 5 MG/1
10 TABLET ORAL EVERY 6 HOURS PRN
Status: DISCONTINUED | OUTPATIENT
Start: 2019-07-28 | End: 2019-07-31 | Stop reason: HOSPADM

## 2019-07-28 RX ORDER — NITROGLYCERIN 0.4 MG/1
0.4 TABLET SUBLINGUAL
Status: DISCONTINUED | OUTPATIENT
Start: 2019-07-28 | End: 2019-07-31 | Stop reason: HOSPADM

## 2019-07-28 RX ADMIN — ONDANSETRON 8 MG: 2 INJECTION INTRAMUSCULAR; INTRAVENOUS at 22:23

## 2019-07-28 RX ADMIN — Medication 3 ML: at 23:15

## 2019-07-28 RX ADMIN — SUCRALFATE 1 G: 1 TABLET ORAL at 23:14

## 2019-07-28 RX ADMIN — SODIUM CHLORIDE 500 ML: 900 INJECTION, SOLUTION INTRAVENOUS at 19:19

## 2019-07-28 RX ADMIN — OXYCODONE HYDROCHLORIDE 10 MG: 5 TABLET ORAL at 23:14

## 2019-07-28 RX ADMIN — HYDRALAZINE HYDROCHLORIDE 25 MG: 25 TABLET ORAL at 23:14

## 2019-07-28 RX ADMIN — SODIUM CHLORIDE, SODIUM LACTATE, POTASSIUM CHLORIDE, AND CALCIUM CHLORIDE 1000 ML: 600; 310; 30; 20 INJECTION, SOLUTION INTRAVENOUS at 20:53

## 2019-07-28 RX ADMIN — SODIUM CHLORIDE 75 ML/HR: 900 INJECTION, SOLUTION INTRAVENOUS at 23:36

## 2019-07-28 RX ADMIN — MAGNESIUM SULFATE HEPTAHYDRATE 2 G: 40 INJECTION, SOLUTION INTRAVENOUS at 20:53

## 2019-07-28 RX ADMIN — LEVETIRACETAM 500 MG: 500 TABLET, FILM COATED ORAL at 23:14

## 2019-07-29 ENCOUNTER — APPOINTMENT (OUTPATIENT)
Dept: RESPIRATORY THERAPY | Facility: HOSPITAL | Age: 59
End: 2019-07-29

## 2019-07-29 ENCOUNTER — HOSPITAL ENCOUNTER (INPATIENT)
Dept: CARDIOLOGY | Facility: HOSPITAL | Age: 59
Discharge: HOME | End: 2019-07-29

## 2019-07-29 PROBLEM — D64.9 ANEMIA: Status: ACTIVE | Noted: 2019-07-29

## 2019-07-29 LAB
ABO GROUP BLD: NORMAL
ALBUMIN SERPL-MCNC: 2.9 G/DL (ref 3.5–4.8)
ALBUMIN/GLOB SERPL: 1.1 G/DL (ref 1–1.7)
ALP SERPL-CCNC: 69 U/L (ref 32–91)
ALT SERPL W P-5'-P-CCNC: 19 U/L (ref 14–54)
ANION GAP SERPL CALCULATED.3IONS-SCNC: 12.2 MMOL/L (ref 5–15)
ANION GAP SERPL CALCULATED.3IONS-SCNC: 14.9 MMOL/L (ref 5–15)
ANISOCYTOSIS BLD QL: ABNORMAL
AST SERPL-CCNC: 26 U/L (ref 15–41)
BH CV ECHO MEAS - ACS: 1.4 CM
BH CV ECHO MEAS - AO MAX PG (FULL): 3.5 MMHG
BH CV ECHO MEAS - AO MAX PG: 7.2 MMHG
BH CV ECHO MEAS - AO MEAN PG (FULL): 1.7 MMHG
BH CV ECHO MEAS - AO MEAN PG: 3.8 MMHG
BH CV ECHO MEAS - AO ROOT AREA (BSA CORRECTED): 1.9
BH CV ECHO MEAS - AO ROOT AREA: 5.9 CM^2
BH CV ECHO MEAS - AO ROOT DIAM: 2.7 CM
BH CV ECHO MEAS - AO V2 MAX: 134.3 CM/SEC
BH CV ECHO MEAS - AO V2 MEAN: 90.2 CM/SEC
BH CV ECHO MEAS - AO V2 VTI: 25.7 CM
BH CV ECHO MEAS - AORTIC HR: 77.7 BPM
BH CV ECHO MEAS - AORTIC R-R: 0.77 SEC
BH CV ECHO MEAS - ASC AORTA: 2.5 CM
BH CV ECHO MEAS - AVA(I,A): 2.4 CM^2
BH CV ECHO MEAS - AVA(I,D): 2.4 CM^2
BH CV ECHO MEAS - AVA(V,A): 1.8 CM^2
BH CV ECHO MEAS - AVA(V,D): 1.8 CM^2
BH CV ECHO MEAS - BSA(HAYCOCK): 1.4 M^2
BH CV ECHO MEAS - BSA: 1.5 M^2
BH CV ECHO MEAS - BZI_BMI: 16.6 KILOGRAMS/M^2
BH CV ECHO MEAS - BZI_METRIC_HEIGHT: 165.1 CM
BH CV ECHO MEAS - BZI_METRIC_WEIGHT: 45.4 KG
BH CV ECHO MEAS - CI(AO): 8 L/MIN/M^2
BH CV ECHO MEAS - CI(LVOT): 3.3 L/MIN/M^2
BH CV ECHO MEAS - CO(AO): 11.7 L/MIN
BH CV ECHO MEAS - CO(LVOT): 4.8 L/MIN
BH CV ECHO MEAS - EDV(CUBED): 50.1 ML
BH CV ECHO MEAS - EDV(MOD-SP4): 50.4 ML
BH CV ECHO MEAS - EDV(TEICH): 57.6 ML
BH CV ECHO MEAS - EF(CUBED): 64.5 %
BH CV ECHO MEAS - EF(MOD-BP): 66 %
BH CV ECHO MEAS - EF(MOD-SP4): 65.8 %
BH CV ECHO MEAS - EF(TEICH): 56.9 %
BH CV ECHO MEAS - ESV(CUBED): 17.8 ML
BH CV ECHO MEAS - ESV(MOD-SP4): 17.3 ML
BH CV ECHO MEAS - ESV(TEICH): 24.8 ML
BH CV ECHO MEAS - FS: 29.2 %
BH CV ECHO MEAS - IVS/LVPW: 1.1
BH CV ECHO MEAS - IVSD: 1.1 CM
BH CV ECHO MEAS - LA DIMENSION(2D): 3.1 CM
BH CV ECHO MEAS - LV DIASTOLIC VOL/BSA (35-75): 34.2 ML/M^2
BH CV ECHO MEAS - LV MASS(C)D: 117.2 GRAMS
BH CV ECHO MEAS - LV MASS(C)DI: 79.5 GRAMS/M^2
BH CV ECHO MEAS - LV MAX PG: 3.7 MMHG
BH CV ECHO MEAS - LV MEAN PG: 2.2 MMHG
BH CV ECHO MEAS - LV SYSTOLIC VOL/BSA (12-30): 11.7 ML/M^2
BH CV ECHO MEAS - LV V1 MAX: 96.3 CM/SEC
BH CV ECHO MEAS - LV V1 MEAN: 67.9 CM/SEC
BH CV ECHO MEAS - LV V1 VTI: 25 CM
BH CV ECHO MEAS - LVIDD: 3.7 CM
BH CV ECHO MEAS - LVIDS: 2.6 CM
BH CV ECHO MEAS - LVOT AREA: 2.5 CM^2
BH CV ECHO MEAS - LVOT DIAM: 1.8 CM
BH CV ECHO MEAS - LVPWD: 1 CM
BH CV ECHO MEAS - MV A MAX VEL: 75.8 CM/SEC
BH CV ECHO MEAS - MV DEC SLOPE: 394.8 CM/SEC^2
BH CV ECHO MEAS - MV DEC TIME: 0.17 SEC
BH CV ECHO MEAS - MV E MAX VEL: 66.8 CM/SEC
BH CV ECHO MEAS - MV E/A: 0.88
BH CV ECHO MEAS - MV MAX PG: 3.8 MMHG
BH CV ECHO MEAS - MV MEAN PG: 2 MMHG
BH CV ECHO MEAS - MV V2 MAX: 97.6 CM/SEC
BH CV ECHO MEAS - MV V2 MEAN: 67.3 CM/SEC
BH CV ECHO MEAS - MV V2 VTI: 23.8 CM
BH CV ECHO MEAS - MVA(VTI): 2.6 CM^2
BH CV ECHO MEAS - PA ACC TIME: 0.07 SEC
BH CV ECHO MEAS - PA MAX PG (FULL): 3.6 MMHG
BH CV ECHO MEAS - PA MAX PG: 5.4 MMHG
BH CV ECHO MEAS - PA MEAN PG (FULL): 2.6 MMHG
BH CV ECHO MEAS - PA MEAN PG: 3.5 MMHG
BH CV ECHO MEAS - PA PR(ACCEL): 46.5 MMHG
BH CV ECHO MEAS - PA V2 MAX: 115.8 CM/SEC
BH CV ECHO MEAS - PA V2 MEAN: 88.4 CM/SEC
BH CV ECHO MEAS - PA V2 VTI: 25.6 CM
BH CV ECHO MEAS - RAP SYSTOLE: 3 MMHG
BH CV ECHO MEAS - RV MAX PG: 1.7 MMHG
BH CV ECHO MEAS - RV MEAN PG: 0.88 MMHG
BH CV ECHO MEAS - RV V1 MAX: 66 CM/SEC
BH CV ECHO MEAS - RV V1 MEAN: 43.2 CM/SEC
BH CV ECHO MEAS - RV V1 VTI: 14.1 CM
BH CV ECHO MEAS - RVDD: 1.7 CM
BH CV ECHO MEAS - RVSP: 45.6 MMHG
BH CV ECHO MEAS - SI(AO): 102.6 ML/M^2
BH CV ECHO MEAS - SI(CUBED): 21.9 ML/M^2
BH CV ECHO MEAS - SI(LVOT): 42 ML/M^2
BH CV ECHO MEAS - SI(MOD-SP4): 22.5 ML/M^2
BH CV ECHO MEAS - SI(TEICH): 22.2 ML/M^2
BH CV ECHO MEAS - SV(AO): 151.2 ML
BH CV ECHO MEAS - SV(CUBED): 32.3 ML
BH CV ECHO MEAS - SV(LVOT): 61.9 ML
BH CV ECHO MEAS - SV(MOD-SP4): 33.2 ML
BH CV ECHO MEAS - SV(TEICH): 32.7 ML
BH CV ECHO MEAS - TR MAX VEL: 326.2 CM/SEC
BILIRUB SERPL-MCNC: 0.9 MG/DL (ref 0.3–1.2)
BLD GP AB SCN SERPL QL: NEGATIVE
BUN BLD-MCNC: 18 MG/DL (ref 8–20)
BUN BLD-MCNC: 18 MG/DL (ref 8–20)
BUN/CREAT SERPL: 18 (ref 5.4–26.2)
BUN/CREAT SERPL: 20 (ref 5.4–26.2)
CALCIUM SPEC-SCNC: 7.5 MG/DL (ref 8.9–10.3)
CALCIUM SPEC-SCNC: 7.7 MG/DL (ref 8.9–10.3)
CHLORIDE SERPL-SCNC: 90 MMOL/L (ref 101–111)
CHLORIDE SERPL-SCNC: 94 MMOL/L (ref 101–111)
CK SERPL-CCNC: 16 U/L (ref 38–234)
CO2 SERPL-SCNC: 21 MMOL/L (ref 22–32)
CO2 SERPL-SCNC: 22 MMOL/L (ref 22–32)
CREAT BLD-MCNC: 0.9 MG/DL (ref 0.4–1)
CREAT BLD-MCNC: 1 MG/DL (ref 0.4–1)
DEPRECATED RDW RBC AUTO: 61.3 FL (ref 37–54)
EOSINOPHIL # BLD MANUAL: 0.1 10*3/MM3 (ref 0–0.4)
EOSINOPHIL NFR BLD MANUAL: 1 % (ref 0.3–6.2)
ERYTHROCYTE [DISTWIDTH] IN BLOOD BY AUTOMATED COUNT: 17.7 % (ref 12.3–15.4)
GFR SERPL CREATININE-BSD FRML MDRD: 57 ML/MIN/1.73
GFR SERPL CREATININE-BSD FRML MDRD: 64 ML/MIN/1.73
GLOBULIN UR ELPH-MCNC: 2.6 GM/DL (ref 2.5–3.8)
GLUCOSE BLD-MCNC: 105 MG/DL (ref 65–99)
GLUCOSE BLD-MCNC: 111 MG/DL (ref 65–99)
HCT VFR BLD AUTO: 21 % (ref 34–46.6)
HCT VFR BLD AUTO: 22.1 % (ref 34–46.6)
HCT VFR BLD AUTO: 24.7 % (ref 34–46.6)
HEMOCCULT STL QL IA: POSITIVE
HGB BLD-MCNC: 7 G/DL (ref 12–15.9)
HGB BLD-MCNC: 7.5 G/DL (ref 12–15.9)
HGB BLD-MCNC: 8.1 G/DL (ref 12–15.9)
IRON 24H UR-MRATE: 14 MCG/DL (ref 28–170)
L PNEUMO1 AG UR QL IA: NEGATIVE
LV EF 2D ECHO EST: 65 %
LYMPHOCYTES # BLD MANUAL: 0.3 10*3/MM3 (ref 0.7–3.1)
LYMPHOCYTES NFR BLD MANUAL: 3 % (ref 19.6–45.3)
LYMPHOCYTES NFR BLD MANUAL: 3 % (ref 5–12)
MCH RBC QN AUTO: 33 PG (ref 26.6–33)
MCHC RBC AUTO-ENTMCNC: 33.8 G/DL (ref 31.5–35.7)
MCV RBC AUTO: 97.7 FL (ref 79–97)
MONOCYTES # BLD AUTO: 0.3 10*3/MM3 (ref 0.1–0.9)
NEUTROPHILS # BLD AUTO: 9.39 10*3/MM3 (ref 1.7–7)
NEUTROPHILS NFR BLD MANUAL: 57 % (ref 42.7–76)
NEUTS BAND NFR BLD MANUAL: 36 % (ref 0–5)
OSMOLALITY UR: 165 MOSM/KG (ref 300–800)
PLAT MORPH BLD: NORMAL
PLATELET # BLD AUTO: 278 10*3/MM3 (ref 140–450)
PMV BLD AUTO: 6.9 FL (ref 6–12)
POTASSIUM BLD-SCNC: 4.9 MMOL/L (ref 3.6–5.1)
POTASSIUM BLD-SCNC: 5.2 MMOL/L (ref 3.6–5.1)
PROT SERPL-MCNC: 5.5 G/DL (ref 6.1–7.9)
RBC # BLD AUTO: 2.27 10*6/MM3 (ref 3.77–5.28)
RH BLD: POSITIVE
SODIUM BLD-SCNC: 121 MMOL/L (ref 136–144)
SODIUM BLD-SCNC: 122 MMOL/L (ref 136–144)
SODIUM BLD-SCNC: 123 MMOL/L (ref 136–144)
SODIUM UR-SCNC: 17 MMOL/L
T&S EXPIRATION DATE: NORMAL
TSH SERPL DL<=0.05 MIU/L-ACNC: 1.17 MIU/ML (ref 0.34–5.6)
URATE SERPL-MCNC: 4.6 MG/DL (ref 2.6–8)
WBC MORPH BLD: NORMAL
WBC NRBC COR # BLD: 10.1 10*3/MM3 (ref 3.4–10.8)

## 2019-07-29 PROCEDURE — 36430 TRANSFUSION BLD/BLD COMPNT: CPT

## 2019-07-29 PROCEDURE — 85007 BL SMEAR W/DIFF WBC COUNT: CPT | Performed by: FAMILY MEDICINE

## 2019-07-29 PROCEDURE — 83935 ASSAY OF URINE OSMOLALITY: CPT | Performed by: INTERNAL MEDICINE

## 2019-07-29 PROCEDURE — 86901 BLOOD TYPING SEROLOGIC RH(D): CPT | Performed by: FAMILY MEDICINE

## 2019-07-29 PROCEDURE — 86901 BLOOD TYPING SEROLOGIC RH(D): CPT

## 2019-07-29 PROCEDURE — 82274 ASSAY TEST FOR BLOOD FECAL: CPT | Performed by: INTERNAL MEDICINE

## 2019-07-29 PROCEDURE — 86850 RBC ANTIBODY SCREEN: CPT | Performed by: FAMILY MEDICINE

## 2019-07-29 PROCEDURE — 93005 ELECTROCARDIOGRAM TRACING: CPT | Performed by: INTERNAL MEDICINE

## 2019-07-29 PROCEDURE — 85027 COMPLETE CBC AUTOMATED: CPT | Performed by: FAMILY MEDICINE

## 2019-07-29 PROCEDURE — 99232 SBSQ HOSP IP/OBS MODERATE 35: CPT | Performed by: FAMILY MEDICINE

## 2019-07-29 PROCEDURE — 93306 TTE W/DOPPLER COMPLETE: CPT

## 2019-07-29 PROCEDURE — 85014 HEMATOCRIT: CPT | Performed by: FAMILY MEDICINE

## 2019-07-29 PROCEDURE — 86900 BLOOD TYPING SEROLOGIC ABO: CPT | Performed by: FAMILY MEDICINE

## 2019-07-29 PROCEDURE — 25010000002 ONDANSETRON PER 1 MG: Performed by: FAMILY MEDICINE

## 2019-07-29 PROCEDURE — 82550 ASSAY OF CK (CPK): CPT | Performed by: INTERNAL MEDICINE

## 2019-07-29 PROCEDURE — 25010000002 DIPHENHYDRAMINE PER 50 MG: Performed by: FAMILY MEDICINE

## 2019-07-29 PROCEDURE — 80053 COMPREHEN METABOLIC PANEL: CPT | Performed by: FAMILY MEDICINE

## 2019-07-29 PROCEDURE — 93306 TTE W/DOPPLER COMPLETE: CPT | Performed by: INTERNAL MEDICINE

## 2019-07-29 PROCEDURE — 84300 ASSAY OF URINE SODIUM: CPT | Performed by: INTERNAL MEDICINE

## 2019-07-29 PROCEDURE — 86923 COMPATIBILITY TEST ELECTRIC: CPT

## 2019-07-29 PROCEDURE — 84550 ASSAY OF BLOOD/URIC ACID: CPT | Performed by: INTERNAL MEDICINE

## 2019-07-29 PROCEDURE — 93225 XTRNL ECG REC<48 HRS REC: CPT

## 2019-07-29 PROCEDURE — 87899 AGENT NOS ASSAY W/OPTIC: CPT | Performed by: INTERNAL MEDICINE

## 2019-07-29 PROCEDURE — 84443 ASSAY THYROID STIM HORMONE: CPT | Performed by: INTERNAL MEDICINE

## 2019-07-29 PROCEDURE — 86900 BLOOD TYPING SEROLOGIC ABO: CPT

## 2019-07-29 PROCEDURE — 85018 HEMOGLOBIN: CPT | Performed by: FAMILY MEDICINE

## 2019-07-29 PROCEDURE — 84295 ASSAY OF SERUM SODIUM: CPT | Performed by: INTERNAL MEDICINE

## 2019-07-29 PROCEDURE — 83540 ASSAY OF IRON: CPT | Performed by: INTERNAL MEDICINE

## 2019-07-29 PROCEDURE — 99221 1ST HOSP IP/OBS SF/LOW 40: CPT | Performed by: INTERNAL MEDICINE

## 2019-07-29 PROCEDURE — P9016 RBC LEUKOCYTES REDUCED: HCPCS

## 2019-07-29 RX ORDER — SODIUM CHLORIDE 1000 MG
1 TABLET, SOLUBLE MISCELLANEOUS 2 TIMES DAILY WITH MEALS
Status: DISCONTINUED | OUTPATIENT
Start: 2019-07-29 | End: 2019-07-31 | Stop reason: HOSPADM

## 2019-07-29 RX ORDER — LIDOCAINE HYDROCHLORIDE AND EPINEPHRINE 10; 10 MG/ML; UG/ML
10 INJECTION, SOLUTION INFILTRATION; PERINEURAL ONCE AS NEEDED
Status: DISCONTINUED | OUTPATIENT
Start: 2019-07-29 | End: 2019-07-31 | Stop reason: HOSPADM

## 2019-07-29 RX ORDER — ACETAMINOPHEN 650 MG/1
650 SUPPOSITORY RECTAL ONCE
Status: COMPLETED | OUTPATIENT
Start: 2019-07-29 | End: 2019-07-29

## 2019-07-29 RX ORDER — SODIUM BICARBONATE 650 MG/1
650 TABLET ORAL 2 TIMES DAILY
Status: COMPLETED | OUTPATIENT
Start: 2019-07-29 | End: 2019-07-29

## 2019-07-29 RX ORDER — DIPHENHYDRAMINE HYDROCHLORIDE 50 MG/ML
25 INJECTION INTRAMUSCULAR; INTRAVENOUS ONCE
Status: COMPLETED | OUTPATIENT
Start: 2019-07-29 | End: 2019-07-29

## 2019-07-29 RX ORDER — TOLVAPTAN 15 MG/1
30 TABLET ORAL ONCE
Status: COMPLETED | OUTPATIENT
Start: 2019-07-29 | End: 2019-07-29

## 2019-07-29 RX ADMIN — OXYCODONE HYDROCHLORIDE 10 MG: 5 TABLET ORAL at 06:03

## 2019-07-29 RX ADMIN — CHLORHEXIDINE GLUCONATE 0.12% ORAL RINSE 15 ML: 1.2 LIQUID ORAL at 10:35

## 2019-07-29 RX ADMIN — CHLORHEXIDINE GLUCONATE 0.12% ORAL RINSE 15 ML: 1.2 LIQUID ORAL at 00:00

## 2019-07-29 RX ADMIN — SODIUM BICARBONATE 650 MG: 650 TABLET ORAL at 09:08

## 2019-07-29 RX ADMIN — Medication 3 ML: at 09:08

## 2019-07-29 RX ADMIN — SUCRALFATE 1 G: 1 TABLET ORAL at 20:56

## 2019-07-29 RX ADMIN — SODIUM CHLORIDE TAB 1 GM 1 G: 1 TAB at 09:08

## 2019-07-29 RX ADMIN — ONDANSETRON 4 MG: 2 INJECTION INTRAMUSCULAR; INTRAVENOUS at 20:56

## 2019-07-29 RX ADMIN — FAMOTIDINE 40 MG: 20 TABLET, FILM COATED ORAL at 09:08

## 2019-07-29 RX ADMIN — SODIUM BICARBONATE 650 MG: 650 TABLET ORAL at 20:55

## 2019-07-29 RX ADMIN — SUCRALFATE 1 G: 1 TABLET ORAL at 17:04

## 2019-07-29 RX ADMIN — Medication 3 ML: at 21:01

## 2019-07-29 RX ADMIN — SUCRALFATE 1 G: 1 TABLET ORAL at 09:08

## 2019-07-29 RX ADMIN — OXYCODONE HYDROCHLORIDE 10 MG: 5 TABLET ORAL at 14:55

## 2019-07-29 RX ADMIN — SUCRALFATE 1 G: 1 TABLET ORAL at 11:47

## 2019-07-29 RX ADMIN — CHLORHEXIDINE GLUCONATE 0.12% ORAL RINSE 15 ML: 1.2 LIQUID ORAL at 20:55

## 2019-07-29 RX ADMIN — LEVETIRACETAM 500 MG: 500 TABLET, FILM COATED ORAL at 09:19

## 2019-07-29 RX ADMIN — ONDANSETRON 4 MG: 2 INJECTION INTRAMUSCULAR; INTRAVENOUS at 14:53

## 2019-07-29 RX ADMIN — SODIUM CHLORIDE TAB 1 GM 1 G: 1 TAB at 17:09

## 2019-07-29 RX ADMIN — ACETAMINOPHEN 650 MG: 325 TABLET, FILM COATED ORAL at 04:00

## 2019-07-29 RX ADMIN — OXYCODONE HYDROCHLORIDE 10 MG: 5 TABLET ORAL at 20:55

## 2019-07-29 RX ADMIN — DIPHENHYDRAMINE HYDROCHLORIDE 25 MG: 50 INJECTION, SOLUTION INTRAMUSCULAR; INTRAVENOUS at 13:20

## 2019-07-29 RX ADMIN — SODIUM CHLORIDE 75 ML/HR: 900 INJECTION, SOLUTION INTRAVENOUS at 09:07

## 2019-07-29 RX ADMIN — ONDANSETRON 4 MG: 2 INJECTION INTRAMUSCULAR; INTRAVENOUS at 04:00

## 2019-07-29 RX ADMIN — ACETAMINOPHEN 650 MG: 650 SUPPOSITORY RECTAL at 13:21

## 2019-07-29 RX ADMIN — LEVETIRACETAM 500 MG: 500 TABLET, FILM COATED ORAL at 20:56

## 2019-07-29 RX ADMIN — TOLVAPTAN 30 MG: 15 TABLET ORAL at 17:04

## 2019-07-29 RX ADMIN — MAGNESIUM OXIDE TAB 400 MG (241.3 MG ELEMENTAL MG) 400 MG: 400 (241.3 MG) TAB at 11:45

## 2019-07-30 LAB
ABO + RH BLD: NORMAL
ANION GAP SERPL CALCULATED.3IONS-SCNC: 12.4 MMOL/L (ref 5–15)
ANION GAP SERPL CALCULATED.3IONS-SCNC: 13.5 MMOL/L (ref 5–15)
BH BB BLOOD EXPIRATION DATE: NORMAL
BH BB BLOOD TYPE BARCODE: 5100
BH BB DISPENSE STATUS: NORMAL
BH BB PRODUCT CODE: NORMAL
BH BB UNIT NUMBER: NORMAL
BUN BLD-MCNC: 15 MG/DL (ref 8–20)
BUN BLD-MCNC: 19 MG/DL (ref 8–20)
BUN/CREAT SERPL: 16.7 (ref 5.4–26.2)
BUN/CREAT SERPL: 23.8 (ref 5.4–26.2)
CALCIUM SPEC-SCNC: 7.9 MG/DL (ref 8.9–10.3)
CALCIUM SPEC-SCNC: 8.3 MG/DL (ref 8.9–10.3)
CHLORIDE SERPL-SCNC: 100 MMOL/L (ref 101–111)
CHLORIDE SERPL-SCNC: 91 MMOL/L (ref 101–111)
CO2 SERPL-SCNC: 23 MMOL/L (ref 22–32)
CO2 SERPL-SCNC: 24 MMOL/L (ref 22–32)
CREAT BLD-MCNC: 0.8 MG/DL (ref 0.4–1)
CREAT BLD-MCNC: 0.9 MG/DL (ref 0.4–1)
GFR SERPL CREATININE-BSD FRML MDRD: 64 ML/MIN/1.73
GFR SERPL CREATININE-BSD FRML MDRD: 73 ML/MIN/1.73
GLUCOSE BLD-MCNC: 124 MG/DL (ref 65–99)
GLUCOSE BLD-MCNC: 124 MG/DL (ref 65–99)
HCT VFR BLD AUTO: 24.2 % (ref 34–46.6)
HCT VFR BLD AUTO: 26 % (ref 34–46.6)
HGB BLD-MCNC: 7.9 G/DL (ref 12–15.9)
HGB BLD-MCNC: 8.7 G/DL (ref 12–15.9)
POTASSIUM BLD-SCNC: 4.4 MMOL/L (ref 3.6–5.1)
POTASSIUM BLD-SCNC: 4.5 MMOL/L (ref 3.6–5.1)
SODIUM BLD-SCNC: 123 MMOL/L (ref 136–144)
SODIUM BLD-SCNC: 132 MMOL/L (ref 136–144)
UNIT  ABO: NORMAL
UNIT  RH: NORMAL

## 2019-07-30 PROCEDURE — 80048 BASIC METABOLIC PNL TOTAL CA: CPT | Performed by: INTERNAL MEDICINE

## 2019-07-30 PROCEDURE — 85018 HEMOGLOBIN: CPT | Performed by: FAMILY MEDICINE

## 2019-07-30 PROCEDURE — 99231 SBSQ HOSP IP/OBS SF/LOW 25: CPT | Performed by: SURGERY

## 2019-07-30 PROCEDURE — 85014 HEMATOCRIT: CPT | Performed by: FAMILY MEDICINE

## 2019-07-30 PROCEDURE — 99232 SBSQ HOSP IP/OBS MODERATE 35: CPT | Performed by: FAMILY MEDICINE

## 2019-07-30 PROCEDURE — 99232 SBSQ HOSP IP/OBS MODERATE 35: CPT | Performed by: INTERNAL MEDICINE

## 2019-07-30 PROCEDURE — 25010000002 ONDANSETRON PER 1 MG: Performed by: FAMILY MEDICINE

## 2019-07-30 RX ORDER — TOLVAPTAN 15 MG/1
30 TABLET ORAL ONCE
Status: DISCONTINUED | OUTPATIENT
Start: 2019-07-30 | End: 2019-07-30

## 2019-07-30 RX ADMIN — LEVETIRACETAM 500 MG: 500 TABLET, FILM COATED ORAL at 09:02

## 2019-07-30 RX ADMIN — SODIUM CHLORIDE TAB 1 GM 1 G: 1 TAB at 09:03

## 2019-07-30 RX ADMIN — Medication 3 ML: at 21:00

## 2019-07-30 RX ADMIN — SUCRALFATE 1 G: 1 TABLET ORAL at 14:24

## 2019-07-30 RX ADMIN — SODIUM CHLORIDE TAB 1 GM 1 G: 1 TAB at 17:47

## 2019-07-30 RX ADMIN — ONDANSETRON 4 MG: 2 INJECTION INTRAMUSCULAR; INTRAVENOUS at 14:23

## 2019-07-30 RX ADMIN — HYDRALAZINE HYDROCHLORIDE 25 MG: 25 TABLET ORAL at 20:49

## 2019-07-30 RX ADMIN — SUCRALFATE 1 G: 1 TABLET ORAL at 09:03

## 2019-07-30 RX ADMIN — SUCRALFATE 1 G: 1 TABLET ORAL at 12:08

## 2019-07-30 RX ADMIN — ONDANSETRON 4 MG: 2 INJECTION INTRAMUSCULAR; INTRAVENOUS at 20:49

## 2019-07-30 RX ADMIN — Medication 3 ML: at 09:03

## 2019-07-30 RX ADMIN — SUCRALFATE 1 G: 1 TABLET ORAL at 17:22

## 2019-07-30 RX ADMIN — LEVETIRACETAM 500 MG: 500 TABLET, FILM COATED ORAL at 20:49

## 2019-07-30 RX ADMIN — OXYCODONE HYDROCHLORIDE 10 MG: 5 TABLET ORAL at 14:23

## 2019-07-30 RX ADMIN — MAGNESIUM OXIDE TAB 400 MG (241.3 MG ELEMENTAL MG) 400 MG: 400 (241.3 MG) TAB at 09:02

## 2019-07-30 RX ADMIN — OXYCODONE HYDROCHLORIDE 10 MG: 5 TABLET ORAL at 20:49

## 2019-07-30 RX ADMIN — FAMOTIDINE 40 MG: 20 TABLET, FILM COATED ORAL at 08:50

## 2019-07-30 RX ADMIN — ONDANSETRON 4 MG: 2 INJECTION INTRAMUSCULAR; INTRAVENOUS at 06:01

## 2019-07-30 RX ADMIN — CHLORHEXIDINE GLUCONATE 0.12% ORAL RINSE 15 ML: 1.2 LIQUID ORAL at 23:00

## 2019-07-30 RX ADMIN — SUCRALFATE 1 G: 1 TABLET ORAL at 20:49

## 2019-07-30 RX ADMIN — OXYCODONE HYDROCHLORIDE 10 MG: 5 TABLET ORAL at 06:01

## 2019-07-31 VITALS
BODY MASS INDEX: 16.68 KG/M2 | SYSTOLIC BLOOD PRESSURE: 183 MMHG | RESPIRATION RATE: 15 BRPM | TEMPERATURE: 98.4 F | HEIGHT: 65 IN | OXYGEN SATURATION: 98 % | HEART RATE: 98 BPM | DIASTOLIC BLOOD PRESSURE: 83 MMHG | WEIGHT: 100.09 LBS

## 2019-07-31 LAB
ALBUMIN SERPL-MCNC: 3 G/DL (ref 3.5–4.8)
ALBUMIN/GLOB SERPL: 0.8 G/DL (ref 1–1.7)
ALP SERPL-CCNC: 87 U/L (ref 32–91)
ALT SERPL W P-5'-P-CCNC: 13 U/L (ref 14–54)
ANION GAP SERPL CALCULATED.3IONS-SCNC: 14.5 MMOL/L (ref 5–15)
AST SERPL-CCNC: 17 U/L (ref 15–41)
BASOPHILS # BLD AUTO: 0 10*3/MM3 (ref 0–0.2)
BASOPHILS NFR BLD AUTO: 0.1 % (ref 0–1.5)
BILIRUB SERPL-MCNC: 0.8 MG/DL (ref 0.3–1.2)
BUN BLD-MCNC: 16 MG/DL (ref 8–20)
BUN/CREAT SERPL: 20 (ref 5.4–26.2)
CA-I SERPL ISE-MCNC: 1.18 MMOL/L (ref 1.2–1.3)
CALCIUM SPEC-SCNC: 8.3 MG/DL (ref 8.9–10.3)
CHLORIDE SERPL-SCNC: 91 MMOL/L (ref 101–111)
CO2 SERPL-SCNC: 25 MMOL/L (ref 22–32)
CREAT BLD-MCNC: 0.8 MG/DL (ref 0.4–1)
DEPRECATED RDW RBC AUTO: 58.2 FL (ref 37–54)
EOSINOPHIL # BLD AUTO: 0.2 10*3/MM3 (ref 0–0.4)
EOSINOPHIL NFR BLD AUTO: 1.6 % (ref 0.3–6.2)
ERYTHROCYTE [DISTWIDTH] IN BLOOD BY AUTOMATED COUNT: 17.5 % (ref 12.3–15.4)
GFR SERPL CREATININE-BSD FRML MDRD: 73 ML/MIN/1.73
GLOBULIN UR ELPH-MCNC: 3.6 GM/DL (ref 2.5–3.8)
GLUCOSE BLD-MCNC: 104 MG/DL (ref 65–99)
HCT VFR BLD AUTO: 27 % (ref 34–46.6)
HGB BLD-MCNC: 8.8 G/DL (ref 12–15.9)
LYMPHOCYTES # BLD AUTO: 0.8 10*3/MM3 (ref 0.7–3.1)
LYMPHOCYTES NFR BLD AUTO: 5.9 % (ref 19.6–45.3)
MAGNESIUM SERPL-MCNC: 1.4 MG/DL (ref 1.8–2.5)
MCH RBC QN AUTO: 31.1 PG (ref 26.6–33)
MCHC RBC AUTO-ENTMCNC: 32.5 G/DL (ref 31.5–35.7)
MCV RBC AUTO: 95.7 FL (ref 79–97)
MONOCYTES # BLD AUTO: 0.9 10*3/MM3 (ref 0.1–0.9)
MONOCYTES NFR BLD AUTO: 7 % (ref 5–12)
NEUTROPHILS # BLD AUTO: 11 10*3/MM3 (ref 1.7–7)
NEUTROPHILS NFR BLD AUTO: 85.4 % (ref 42.7–76)
NRBC BLD AUTO-RTO: 0.1 /100 WBC (ref 0–0.2)
PHOSPHATE SERPL-MCNC: 2.8 MG/DL (ref 2.4–4.7)
PLATELET # BLD AUTO: 293 10*3/MM3 (ref 140–450)
PMV BLD AUTO: 7.4 FL (ref 6–12)
POTASSIUM BLD-SCNC: 4.5 MMOL/L (ref 3.6–5.1)
PROT SERPL-MCNC: 6.6 G/DL (ref 6.1–7.9)
RBC # BLD AUTO: 2.82 10*6/MM3 (ref 3.77–5.28)
SODIUM BLD-SCNC: 126 MMOL/L (ref 136–144)
WBC NRBC COR # BLD: 12.8 10*3/MM3 (ref 3.4–10.8)

## 2019-07-31 PROCEDURE — 82330 ASSAY OF CALCIUM: CPT | Performed by: INTERNAL MEDICINE

## 2019-07-31 PROCEDURE — 84100 ASSAY OF PHOSPHORUS: CPT | Performed by: INTERNAL MEDICINE

## 2019-07-31 PROCEDURE — 80053 COMPREHEN METABOLIC PANEL: CPT | Performed by: INTERNAL MEDICINE

## 2019-07-31 PROCEDURE — 99232 SBSQ HOSP IP/OBS MODERATE 35: CPT | Performed by: INTERNAL MEDICINE

## 2019-07-31 PROCEDURE — 83735 ASSAY OF MAGNESIUM: CPT | Performed by: INTERNAL MEDICINE

## 2019-07-31 PROCEDURE — 99238 HOSP IP/OBS DSCHRG MGMT 30/<: CPT | Performed by: FAMILY MEDICINE

## 2019-07-31 PROCEDURE — 85025 COMPLETE CBC W/AUTO DIFF WBC: CPT | Performed by: FAMILY MEDICINE

## 2019-07-31 PROCEDURE — 25010000002 ONDANSETRON PER 1 MG: Performed by: FAMILY MEDICINE

## 2019-07-31 RX ORDER — MAGNESIUM SULFATE HEPTAHYDRATE 40 MG/ML
2 INJECTION, SOLUTION INTRAVENOUS ONCE
Status: DISCONTINUED | OUTPATIENT
Start: 2019-07-31 | End: 2019-07-31

## 2019-07-31 RX ORDER — FAMOTIDINE 40 MG/1
40 TABLET, FILM COATED ORAL DAILY
Qty: 30 TABLET | Refills: 2 | Status: SHIPPED | OUTPATIENT
Start: 2019-07-31 | End: 2019-11-14 | Stop reason: SDUPTHER

## 2019-07-31 RX ORDER — CALCIUM GLUCONATE 20 MG/ML
1 INJECTION, SOLUTION INTRAVENOUS ONCE
Status: DISCONTINUED | OUTPATIENT
Start: 2019-07-31 | End: 2019-07-31 | Stop reason: HOSPADM

## 2019-07-31 RX ORDER — SODIUM CHLORIDE 1000 MG
1 TABLET, SOLUBLE MISCELLANEOUS 2 TIMES DAILY WITH MEALS
Qty: 60 TABLET | Refills: 2 | Status: SHIPPED | OUTPATIENT
Start: 2019-07-31 | End: 2019-11-25

## 2019-07-31 RX ORDER — TOLVAPTAN 15 MG/1
30 TABLET ORAL ONCE
Status: COMPLETED | OUTPATIENT
Start: 2019-07-31 | End: 2019-07-31

## 2019-07-31 RX ORDER — CALCIUM GLUCONATE 20 MG/ML
1 INJECTION, SOLUTION INTRAVENOUS ONCE
Status: DISCONTINUED | OUTPATIENT
Start: 2019-07-31 | End: 2019-07-31

## 2019-07-31 RX ORDER — MAGNESIUM SULFATE HEPTAHYDRATE 40 MG/ML
2 INJECTION, SOLUTION INTRAVENOUS EVERY 8 HOURS
Status: DISCONTINUED | OUTPATIENT
Start: 2019-07-31 | End: 2019-07-31 | Stop reason: HOSPADM

## 2019-07-31 RX ADMIN — HYDRALAZINE HYDROCHLORIDE 25 MG: 25 TABLET ORAL at 10:40

## 2019-07-31 RX ADMIN — CHLORHEXIDINE GLUCONATE 0.12% ORAL RINSE 15 ML: 1.2 LIQUID ORAL at 10:42

## 2019-07-31 RX ADMIN — LEVETIRACETAM 500 MG: 500 TABLET, FILM COATED ORAL at 10:40

## 2019-07-31 RX ADMIN — FAMOTIDINE 40 MG: 20 TABLET, FILM COATED ORAL at 10:40

## 2019-07-31 RX ADMIN — TOLVAPTAN 30 MG: 15 TABLET ORAL at 10:43

## 2019-07-31 RX ADMIN — MAGNESIUM OXIDE TAB 400 MG (241.3 MG ELEMENTAL MG) 400 MG: 400 (241.3 MG) TAB at 10:40

## 2019-07-31 RX ADMIN — SODIUM CHLORIDE TAB 1 GM 1 G: 1 TAB at 10:40

## 2019-07-31 RX ADMIN — Medication 3 ML: at 10:43

## 2019-07-31 RX ADMIN — SUCRALFATE 1 G: 1 TABLET ORAL at 10:41

## 2019-07-31 RX ADMIN — OXYCODONE HYDROCHLORIDE 10 MG: 5 TABLET ORAL at 10:42

## 2019-07-31 RX ADMIN — ONDANSETRON 4 MG: 2 INJECTION INTRAMUSCULAR; INTRAVENOUS at 10:42

## 2019-08-01 ENCOUNTER — READMISSION MANAGEMENT (OUTPATIENT)
Dept: CALL CENTER | Facility: HOSPITAL | Age: 59
End: 2019-08-01

## 2019-08-01 NOTE — OUTREACH NOTE
Prep Survey      Responses   Facility patient discharged from?  [facility]   Is patient eligible?  Yes   Discharge diagnosis  Anemia   Does the patient have one of the following disease processes/diagnoses(primary or secondary)?  Other   Does the patient have Home health ordered?  No   Is there a DME ordered?  No   Prep survey completed?  Yes          Debby Mims RN

## 2019-08-02 ENCOUNTER — READMISSION MANAGEMENT (OUTPATIENT)
Dept: CALL CENTER | Facility: HOSPITAL | Age: 59
End: 2019-08-02

## 2019-08-02 LAB
BACTERIA SPEC AEROBE CULT: NORMAL
BACTERIA SPEC AEROBE CULT: NORMAL

## 2019-08-02 PROCEDURE — 93227 XTRNL ECG REC<48 HR R&I: CPT | Performed by: INTERNAL MEDICINE

## 2019-08-02 NOTE — OUTREACH NOTE
Medical Week 1 Survey      Responses   Facility patient discharged from?  [facility]   Does the patient have one of the following disease processes/diagnoses(primary or secondary)?  Other   Is there a successful TCM telephone encounter documented?  No   Week 1 attempt successful?  Yes   Call start time  1123   Call end time  1128   Discharge diagnosis  Anemia   Meds reviewed with patient/caregiver?  Yes   Is the patient having any side effects they believe may be caused by any medication additions or changes?  No   Does the patient have all medications ordered at discharge?  Yes   Is the patient taking all medications as directed (includes completed medication regime)?  Yes   Does the patient have a primary care provider?   Yes   Does the patient have an appointment with their PCP within 7 days of discharge?  No   What is preventing the patient from scheduling follow up appointments within 7 days of discharge?  Haven't had time [PATIENT STATES HER  IS MAKING HER FU APPT WITH DR. CARIAS. PATIENT DECLINED ASSISTANCE IN MAKING THIS. ]   Nursing Interventions  Educated patient on importance of making appointment   Has home health visited the patient within 72 hours of discharge?  N/A   Did the patient receive a copy of their discharge instructions?  Yes   Nursing interventions  Reviewed instructions with patient   What is the patient's perception of their health status since discharge?  Improving   Is the patient/caregiver able to teach back signs and symptoms related to disease process for when to call PCP?  Yes   Is the patient/caregiver able to teach back signs and symptoms related to disease process for when to call 911?  Yes   Is the patient/caregiver able to teach back the hierarchy of who to call/visit for symptoms/problems? PCP, Specialist, Home health nurse, Urgent Care, ED, 911  Yes   Week 1 call completed?  Yes          Marisela Lao LPN

## 2019-08-06 ENCOUNTER — TELEPHONE (OUTPATIENT)
Dept: FAMILY MEDICINE CLINIC | Facility: CLINIC | Age: 59
End: 2019-08-06

## 2019-08-06 RX ORDER — ALBUTEROL SULFATE 90 UG/1
2 AEROSOL, METERED RESPIRATORY (INHALATION) EVERY 4 HOURS PRN
Qty: 1 INHALER | Refills: 5 | Status: SHIPPED | OUTPATIENT
Start: 2019-08-06

## 2019-08-07 ENCOUNTER — TELEPHONE (OUTPATIENT)
Dept: BARIATRICS/WEIGHT MGMT | Facility: CLINIC | Age: 59
End: 2019-08-07

## 2019-08-07 NOTE — TELEPHONE ENCOUNTER
Sri Bobo daughter Carole called re: her mother (pt)  Needs staples removed from her scalp; Dr. Zuñiga placed them on 7/28/19 in ER after pt had a fall; spoke with Dr. Zuñiga, pt to come into office tomorrow and have staples removed

## 2019-08-08 ENCOUNTER — CLINICAL SUPPORT (OUTPATIENT)
Dept: BARIATRICS/WEIGHT MGMT | Facility: CLINIC | Age: 59
End: 2019-08-08

## 2019-08-09 ENCOUNTER — READMISSION MANAGEMENT (OUTPATIENT)
Dept: CALL CENTER | Facility: HOSPITAL | Age: 59
End: 2019-08-09

## 2019-08-09 NOTE — OUTREACH NOTE
Medical Week 2 Survey      Responses   Facility patient discharged from?  [facility]   Does the patient have one of the following disease processes/diagnoses(primary or secondary)?  Other   Week 2 attempt successful?  Yes   Call start time  0906   Discharge diagnosis  Anemia   Call end time  0909   Meds reviewed with patient/caregiver?  Yes   Is the patient having any side effects they believe may be caused by any medication additions or changes?  No   Does the patient have all medications ordered at discharge?  Yes   Is the patient taking all medications as directed (includes completed medication regime)?  Yes   Does the patient have a primary care provider?   Yes   Does the patient have an appointment with their PCP within 7 days of discharge?  Greater than 7 days   What is preventing the patient from scheduling follow up appointments within 7 days of discharge?  Earlier appointment not available   Nursing Interventions  Verified appointment date/time/provider   Has the patient kept scheduled appointments due by today?  N/A   Has home health visited the patient within 72 hours of discharge?  N/A   Did the patient receive a copy of their discharge instructions?  Yes   Nursing interventions  Reviewed instructions with patient   What is the patient's perception of their health status since discharge?  Improving   Is the patient/caregiver able to teach back signs and symptoms related to disease process for when to call PCP?  Yes   Is the patient/caregiver able to teach back signs and symptoms related to disease process for when to call 911?  Yes   Is the patient/caregiver able to teach back the hierarchy of who to call/visit for symptoms/problems? PCP, Specialist, Home health nurse, Urgent Care, ED, 911  Yes   Week 2 Call Completed?  Yes   Graduated  Yes   Did the patient feel the follow up calls were helpful during their recovery period?  Yes   Was the number of calls appropriate?  Yes          Marisela Lao LPN

## 2019-08-10 PROCEDURE — 93010 ELECTROCARDIOGRAM REPORT: CPT | Performed by: INTERNAL MEDICINE

## 2019-08-20 ENCOUNTER — OFFICE VISIT (OUTPATIENT)
Dept: SURGERY | Facility: CLINIC | Age: 59
End: 2019-08-20

## 2019-08-20 VITALS
SYSTOLIC BLOOD PRESSURE: 177 MMHG | BODY MASS INDEX: 14.83 KG/M2 | DIASTOLIC BLOOD PRESSURE: 88 MMHG | WEIGHT: 89 LBS | TEMPERATURE: 96.9 F | HEART RATE: 80 BPM | HEIGHT: 65 IN | OXYGEN SATURATION: 96 %

## 2019-08-20 DIAGNOSIS — Z43.1 ATTENTION TO GASTROSTOMY TUBE (HCC): Primary | ICD-10-CM

## 2019-08-20 PROCEDURE — 99212 OFFICE O/P EST SF 10 MIN: CPT | Performed by: SURGERY

## 2019-08-20 RX ORDER — DEMECLOCYCLINE HYDROCHLORIDE 300 MG/1
300 TABLET, FILM COATED ORAL 2 TIMES DAILY
Refills: 11 | COMMUNITY
Start: 2019-08-06

## 2019-08-20 NOTE — PROGRESS NOTES
Subjective   Sri Bobo is a 59 y.o. female.     History of present illness:  Ms. Bobo is seen in the office today at the request of Dr. Villeda to look at her G-tube.  We last saw her in  for that.  Back then she had a lot of proud flesh around the tube that we had to burn.  She currently has no proud flesh around the tube.  The skin is somewhat red but that is just scar.  The present tube is 3 months old.  In the office today we remove the tube and cleaned it check the balloon it is all looks fine.  We have reinserted the tube and have tightened the skin pledget so she does not leak around the tube.    Past Medical History:   Diagnosis Date   • Anemia    • Broken hip (CMS/HCC) 2019    rt hip broken   • Cerebral hemorrhage (CMS/HCC)    • Dislocated shoulder 2017    dislocated left shoulder   • Fall 2018    rt shoulder and elbow injured with fall, was inpt at [facility] ( to start PT in Dec 2018)   • Gastrostomy tube in place (CMS/HCC)    • GERD (gastroesophageal reflux disease)    •  1 para 1     [id number]   • History of right common carotid artery stent placement    • Hypertension    • Pneumonia 2018   • Renal failure, chronic, stage 3 (moderate) (CMS/HCC)    • Seizures (CMS/HCC)    • Shingles 2017   • Tonsillar cancer (CMS/HCC)    • Vitamin B 12 deficiency        Past Surgical History:   Procedure Laterality Date   • BUNIONECTOMY     • CAROTID STENT Right    • ESOPHAGEAL DILATATION      botox injection   • GTUBE INSERTION     • HIP SURGERY Right 2019    rt hip broken   • ORIF ULNA/RADIUS FRACTURES Right     ORIF, right distal radius fx 08/10/2018 metal plate in right shoulder   • WRIST FRACTURE SURGERY Left        Outpatient Encounter Medications as of 2019   Medication Sig Dispense Refill   • albuterol sulfate  (90 Base) MCG/ACT inhaler Inhale 2 puffs Every 4 (Four) Hours As Needed for Wheezing. 1 inhaler 5   • amitriptyline (ELAVIL) 50 MG tablet Take 2 tablets by mouth  "Every Night. 180 tablet 1   • carvedilol (COREG) 12.5 MG tablet Take 1 tablet by mouth 2 (Two) Times a Day With Meals. 60 tablet 2   • chlorhexidine (PERIDEX) 0.12 % solution Apply 15 mL to the mouth or throat 2 (Two) Times a Day.     • clopidogrel (PLAVIX) 75 MG tablet TAKE ONE TABLET BY MOUTH DAILY 90 tablet 0   • demeclocycline (DECLOMYCIN) 300 MG tablet Take 300 mg by mouth 2 (Two) Times a Day.  11   • famotidine (PEPCID) 40 MG tablet Take 1 tablet by mouth Daily. 30 tablet 2   • hydrALAZINE (APRESOLINE) 25 MG tablet Take 25 mg by mouth 2 (Two) Times a Day.     • levETIRAcetam (KEPPRA) 500 MG tablet TAKE ONE TABLET BY MOUTH TWICE A  tablet 0   • oxyCODONE (ROXICODONE) 10 MG tablet Take 1 tablet by mouth 4 (Four) Times a Day As Needed for Moderate Pain . 120 tablet 0   • sodium chloride 1 g tablet 1 tablet by Per G Tube route 2 (Two) Times a Day With Meals. 60 tablet 2   • sucralfate (CARAFATE) 1 g tablet Take 1 g by mouth 4 (Four) Times a Day.       No facility-administered encounter medications on file as of 8/20/2019.        Allergies   Allergen Reactions   • Codeine Rash and Anaphylaxis     \"LIPS AND THROAT SWELL UP\"   • Sulfa Antibiotics Rash, Anaphylaxis and Swelling     \"LIPS AND THROAT SWELL UP\"       Family History   Problem Relation Age of Onset   • Cancer Mother         lymphoma   • Stroke Father    • Diabetes Other    • Heart disease Other    • Hypertension Other        Social History     Socioeconomic History   • Marital status:      Spouse name: Not on file   • Number of children: Not on file   • Years of education: Not on file   • Highest education level: Not on file   Tobacco Use   • Smoking status: Former Smoker   • Smokeless tobacco: Never Used   • Tobacco comment: 5 years stopped smoking   Substance and Sexual Activity   • Alcohol use: No     Frequency: Never   • Drug use: No   • Sexual activity: Defer       The following portions of the patient's history were reviewed and updated " as appropriate: allergies, current medications, past family history, past medical history, past social history, past surgical history and problem list.    Objective       Assessment/Plan   There are no diagnoses linked to this encounter.     Impression:Tube is functioning normally.  No reason to change the tube.    Recommendation:The tube was removed and cleaned and reinserted.  7 cc of water was placed in the balloon and the skin pledget is snugged up to prevent leak around the tubeRecheck in the office as needed             Silverio Nix,   8/20/2019  10:30 AM

## 2019-09-20 ENCOUNTER — HOSPITAL ENCOUNTER (EMERGENCY)
Facility: HOSPITAL | Age: 59
Discharge: HOME | End: 2019-09-20
Attending: EMERGENCY MEDICINE | Admitting: EMERGENCY MEDICINE

## 2019-09-20 ENCOUNTER — APPOINTMENT (OUTPATIENT)
Dept: GENERAL RADIOLOGY | Facility: HOSPITAL | Age: 59
End: 2019-09-20

## 2019-09-20 ENCOUNTER — APPOINTMENT (OUTPATIENT)
Dept: CT IMAGING | Facility: HOSPITAL | Age: 59
End: 2019-09-20

## 2019-09-20 VITALS
HEIGHT: 63 IN | BODY MASS INDEX: 15.77 KG/M2 | SYSTOLIC BLOOD PRESSURE: 133 MMHG | OXYGEN SATURATION: 96 % | DIASTOLIC BLOOD PRESSURE: 73 MMHG | WEIGHT: 89 LBS | TEMPERATURE: 98.6 F | RESPIRATION RATE: 18 BRPM | HEART RATE: 83 BPM

## 2019-09-20 DIAGNOSIS — H70.93 MASTOIDITIS OF BOTH SIDES: Primary | ICD-10-CM

## 2019-09-20 DIAGNOSIS — R41.0 EPISODE OF CONFUSION: ICD-10-CM

## 2019-09-20 LAB
ALBUMIN SERPL-MCNC: 3.4 G/DL (ref 3.5–4.8)
ALBUMIN/GLOB SERPL: 0.9 G/DL (ref 1–1.7)
ALP SERPL-CCNC: 78 U/L (ref 32–91)
ALT SERPL W P-5'-P-CCNC: 16 U/L (ref 14–54)
ANION GAP SERPL CALCULATED.3IONS-SCNC: 13.8 MMOL/L (ref 5–15)
AST SERPL-CCNC: 22 U/L (ref 15–41)
BASOPHILS # BLD AUTO: 0 10*3/MM3 (ref 0–0.2)
BASOPHILS NFR BLD AUTO: 0.1 % (ref 0–1.5)
BILIRUB SERPL-MCNC: 0.5 MG/DL (ref 0.3–1.2)
BILIRUB UR QL STRIP: NEGATIVE
BNP SERPL-MCNC: 182 PG/ML
BUN BLD-MCNC: 20 MG/DL (ref 8–20)
BUN/CREAT SERPL: 18.2 (ref 5.4–26.2)
CALCIUM SPEC-SCNC: 8.7 MG/DL (ref 8.9–10.3)
CHLORIDE SERPL-SCNC: 96 MMOL/L (ref 101–111)
CLARITY UR: CLEAR
CO2 SERPL-SCNC: 27 MMOL/L (ref 22–32)
COLOR UR: YELLOW
CREAT BLD-MCNC: 1.1 MG/DL (ref 0.4–1)
D-LACTATE SERPL-SCNC: 0.6 MMOL/L (ref 0.5–2)
DEPRECATED RDW RBC AUTO: 56 FL (ref 37–54)
EOSINOPHIL # BLD AUTO: 0.1 10*3/MM3 (ref 0–0.4)
EOSINOPHIL NFR BLD AUTO: 1.2 % (ref 0.3–6.2)
ERYTHROCYTE [DISTWIDTH] IN BLOOD BY AUTOMATED COUNT: 16.8 % (ref 12.3–15.4)
GFR SERPL CREATININE-BSD FRML MDRD: 51 ML/MIN/1.73
GLOBULIN UR ELPH-MCNC: 3.7 GM/DL (ref 2.5–3.8)
GLUCOSE BLD-MCNC: 122 MG/DL (ref 65–99)
GLUCOSE UR STRIP-MCNC: NEGATIVE MG/DL
HCT VFR BLD AUTO: 28.7 % (ref 34–46.6)
HGB BLD-MCNC: 9.5 G/DL (ref 12–15.9)
HGB UR QL STRIP.AUTO: NEGATIVE
INR PPP: 1.07 (ref 0.9–1.1)
KETONES UR QL STRIP: NEGATIVE
LEUKOCYTE ESTERASE UR QL STRIP.AUTO: NEGATIVE
LYMPHOCYTES # BLD AUTO: 0.7 10*3/MM3 (ref 0.7–3.1)
LYMPHOCYTES NFR BLD AUTO: 7.4 % (ref 19.6–45.3)
MCH RBC QN AUTO: 31.2 PG (ref 26.6–33)
MCHC RBC AUTO-ENTMCNC: 32.9 G/DL (ref 31.5–35.7)
MCV RBC AUTO: 94.7 FL (ref 79–97)
MONOCYTES # BLD AUTO: 0.5 10*3/MM3 (ref 0.1–0.9)
MONOCYTES NFR BLD AUTO: 5.5 % (ref 5–12)
NEUTROPHILS # BLD AUTO: 7.9 10*3/MM3 (ref 1.7–7)
NEUTROPHILS NFR BLD AUTO: 85.8 % (ref 42.7–76)
NITRITE UR QL STRIP: NEGATIVE
NRBC BLD AUTO-RTO: 0.1 /100 WBC (ref 0–0.2)
PH UR STRIP.AUTO: 7 [PH] (ref 5–8)
PLATELET # BLD AUTO: 288 10*3/MM3 (ref 140–450)
PMV BLD AUTO: 7.5 FL (ref 6–12)
POTASSIUM BLD-SCNC: 4.8 MMOL/L (ref 3.6–5.1)
PROT SERPL-MCNC: 7.1 G/DL (ref 6.1–7.9)
PROT UR QL STRIP: NEGATIVE
PROTHROMBIN TIME: 10.9 SECONDS (ref 9.6–11.7)
RBC # BLD AUTO: 3.03 10*6/MM3 (ref 3.77–5.28)
SODIUM BLD-SCNC: 132 MMOL/L (ref 136–144)
SP GR UR STRIP: 1.01 (ref 1–1.03)
TROPONIN I SERPL-MCNC: <0.03 NG/ML (ref 0–0.03)
UROBILINOGEN UR QL STRIP: NORMAL
WBC NRBC COR # BLD: 9.2 10*3/MM3 (ref 3.4–10.8)

## 2019-09-20 PROCEDURE — 70450 CT HEAD/BRAIN W/O DYE: CPT

## 2019-09-20 PROCEDURE — 80053 COMPREHEN METABOLIC PANEL: CPT | Performed by: NURSE PRACTITIONER

## 2019-09-20 PROCEDURE — 85025 COMPLETE CBC W/AUTO DIFF WBC: CPT | Performed by: NURSE PRACTITIONER

## 2019-09-20 PROCEDURE — 83880 ASSAY OF NATRIURETIC PEPTIDE: CPT | Performed by: NURSE PRACTITIONER

## 2019-09-20 PROCEDURE — 96374 THER/PROPH/DIAG INJ IV PUSH: CPT

## 2019-09-20 PROCEDURE — P9612 CATHETERIZE FOR URINE SPEC: HCPCS

## 2019-09-20 PROCEDURE — 25010000002 CEFTRIAXONE PER 250 MG: Performed by: EMERGENCY MEDICINE

## 2019-09-20 PROCEDURE — 87040 BLOOD CULTURE FOR BACTERIA: CPT | Performed by: NURSE PRACTITIONER

## 2019-09-20 PROCEDURE — 85610 PROTHROMBIN TIME: CPT | Performed by: NURSE PRACTITIONER

## 2019-09-20 PROCEDURE — 71045 X-RAY EXAM CHEST 1 VIEW: CPT

## 2019-09-20 PROCEDURE — 99283 EMERGENCY DEPT VISIT LOW MDM: CPT

## 2019-09-20 PROCEDURE — 81003 URINALYSIS AUTO W/O SCOPE: CPT | Performed by: EMERGENCY MEDICINE

## 2019-09-20 PROCEDURE — 84484 ASSAY OF TROPONIN QUANT: CPT | Performed by: NURSE PRACTITIONER

## 2019-09-20 PROCEDURE — 93005 ELECTROCARDIOGRAM TRACING: CPT | Performed by: NURSE PRACTITIONER

## 2019-09-20 PROCEDURE — 83605 ASSAY OF LACTIC ACID: CPT

## 2019-09-20 RX ORDER — CEFDINIR 300 MG/1
300 CAPSULE ORAL 2 TIMES DAILY
Qty: 20 CAPSULE | Refills: 0 | Status: SHIPPED | OUTPATIENT
Start: 2019-09-20 | End: 2019-10-22

## 2019-09-20 RX ADMIN — CEFTRIAXONE SODIUM 1 G: 10 INJECTION, POWDER, FOR SOLUTION INTRAVENOUS at 15:53

## 2019-09-20 RX ADMIN — HEPARIN SODIUM (PORCINE) LOCK FLUSH IV SOLN 100 UNIT/ML 500 UNITS: 100 SOLUTION at 16:12

## 2019-09-20 NOTE — DISCHARGE INSTRUCTIONS
Return for increasing confusion fever vomiting loss of speech weakness to one side or the other chest pain shortness of breath or any other new or worsening problems or concerns.  Omnicef.  Primary care doctor on Monday.  Rest plenty fluids.

## 2019-09-20 NOTE — ED NOTES
Patient sent here by Dr. Damian due to confusion, dizziness and altered gait.     Cece Matias, RN  09/20/19 1111

## 2019-09-20 NOTE — ED PROVIDER NOTES
Subjective   Chief complaint confusion    History of present illness 59-year-old female who has several health problems who was brought to the hospital today for some confusion today.  She did not know the month or where she was in some of the basic questions that she would normally be able to answer.  This is been sore intermittent for the last 3 days.  No speech difficulty out of the ordinary no paralysis.  No headache there is been no fever chills tick bites or rash she is had some cough and congestion.  Family contacted her nephrologist today and was sent to the hospital because patient's had a history of low sodiums in the past.  She denies any complaints of pain currently she states that she is had no recent hospitalization or injuries patient's family reports daughter that she is back and seems to be normal currently.  Nothing makes it better worse intermittent for the last 3 days.            Review of Systems   Constitutional: Negative for chills and fever.   HENT: Positive for congestion. Negative for sinus pressure.    Eyes: Negative for photophobia and visual disturbance.   Respiratory: Positive for cough. Negative for chest tightness and shortness of breath.    Cardiovascular: Negative for chest pain and leg swelling.   Gastrointestinal: Negative for abdominal pain and vomiting.   Endocrine: Negative for cold intolerance and heat intolerance.   Genitourinary: Negative for difficulty urinating and dysuria.   Musculoskeletal: Negative for arthralgias and back pain.   Skin: Negative for color change and pallor.   Neurological: Positive for weakness. Negative for dizziness, facial asymmetry, speech difficulty and headaches.   Psychiatric/Behavioral: Negative for agitation and behavioral problems.       Past Medical History:   Diagnosis Date   • Anemia    • Broken hip (CMS/HCC) 03/2019    rt hip broken   • Cerebral hemorrhage (CMS/[facility])    • Dislocated shoulder 09/2017    dislocated left shoulder   • Fall  "2018    rt shoulder and elbow injured with fall, was inpt at [facility] ( to start PT in Dec 2018)   • Gastrostomy tube in place (CMS/[facility])    • GERD (gastroesophageal reflux disease)    •  1 para 1     [id number]   • History of right common carotid artery stent placement    • Hypertension    • Pneumonia 2018   • Renal failure, chronic, stage 3 (moderate) (CMS/[facility])    • Seizures (CMS/[facility])    • Shingles 2017   • Tonsillar cancer (CMS/[facility])    • Vitamin B 12 deficiency        Allergies   Allergen Reactions   • Codeine Rash and Anaphylaxis     \"LIPS AND THROAT SWELL UP\"   • Sulfa Antibiotics Rash, Anaphylaxis and Swelling     \"LIPS AND THROAT SWELL UP\"       Past Surgical History:   Procedure Laterality Date   • BUNIONECTOMY     • CAROTID STENT Right    • ESOPHAGEAL DILATATION      botox injection   • GTUBE INSERTION     • HIP SURGERY Right 2019    rt hip broken   • ORIF ULNA/RADIUS FRACTURES Right     ORIF, right distal radius fx 08/10/2018 metal plate in right shoulder   • WRIST FRACTURE SURGERY Left        Family History   Problem Relation Age of Onset   • Cancer Mother         lymphoma   • Stroke Father    • Diabetes Other    • Heart disease Other    • Hypertension Other        Social History     Socioeconomic History   • Marital status:      Spouse name: Not on file   • Number of children: Not on file   • Years of education: Not on file   • Highest education level: Not on file   Tobacco Use   • Smoking status: Former Smoker   • Smokeless tobacco: Never Used   • Tobacco comment: 5 years stopped smoking   Substance and Sexual Activity   • Alcohol use: No     Frequency: Never   • Drug use: No   • Sexual activity: Defer     Prior to Admission medications    Medication Sig Start Date End Date Taking? Authorizing Provider   albuterol sulfate  (90 Base) MCG/ACT inhaler Inhale 2 puffs Every 4 (Four) Hours As Needed for Wheezing. 19   Leonid Villeda Jr., MD   amitriptyline (ELAVIL) 50 MG " tablet Take 2 tablets by mouth Every Night. 6/17/19   Cris Ernandez APRN   carvedilol (COREG) 12.5 MG tablet Take 1 tablet by mouth 2 (Two) Times a Day With Meals. 7/11/19   Leonid Villeda Jr., MD   chlorhexidine (PERIDEX) 0.12 % solution Apply 15 mL to the mouth or throat 2 (Two) Times a Day. 6/14/19   Amy Danielle MD   clopidogrel (PLAVIX) 75 MG tablet TAKE ONE TABLET BY MOUTH DAILY 7/10/19   Leonid Villeda Jr., MD   demeclocycline (DECLOMYCIN) 300 MG tablet Take 300 mg by mouth 2 (Two) Times a Day. 8/6/19   Amy Danielle MD   famotidine (PEPCID) 40 MG tablet Take 1 tablet by mouth Daily. 7/31/19   Leonid Villeda Jr., MD   hydrALAZINE (APRESOLINE) 25 MG tablet Take 25 mg by mouth 2 (Two) Times a Day. 4/1/19   Amy Danielle MD   levETIRAcetam (KEPPRA) 500 MG tablet TAKE ONE TABLET BY MOUTH TWICE A DAY 6/19/19   Cris Ernandez APRN   oxyCODONE (ROXICODONE) 10 MG tablet Take 1 tablet by mouth 4 (Four) Times a Day As Needed for Moderate Pain . 7/2/19   Magdalena Carbajal MD   sodium chloride 1 g tablet 1 tablet by Per G Tube route 2 (Two) Times a Day With Meals. 7/31/19   Leonid Villeda Jr., MD   sucralfate (CARAFATE) 1 g tablet Take 1 g by mouth 4 (Four) Times a Day. 6/3/19   Amy Danielle MD           Objective   Physical Exam  59-year-old awake alert no acute distress HEENT extraocular muscles intact pupils equal round react no photophobia disc are sharp mouth otherwise clear she is got speech abnormality but this is chronic per the family and no worse than usual.  Neck is supple no adenopathy the TMs she has some redness and a cerumen impaction there is no mastoid tenderness or redness or swelling.  Neck is supple without adenopathy no meningeal signs no JVD no bruits lungs clear no retractions heart regular without murmur abdomen was soft without tenderness good bowel sounds no masses no rebound or guarding extremities pulses are equal throughout upper and lower  extremities no edema cords or Homans sign she is awake alert orientated x3 patient has no facial asymmetry normal extremity strength with no focal weakness or drift she has some speech at MI which is chronic.  No focal findings  Procedures           ED Course      Results for orders placed or performed during the hospital encounter of 09/20/19   Comprehensive Metabolic Panel   Result Value Ref Range    Glucose 122 (H) 65 - 99 mg/dL    BUN 20 8 - 20 mg/dL    Creatinine 1.10 (H) 0.40 - 1.00 mg/dL    Sodium 132 (L) 136 - 144 mmol/L    Potassium 4.8 3.6 - 5.1 mmol/L    Chloride 96 (L) 101 - 111 mmol/L    CO2 27.0 22.0 - 32.0 mmol/L    Calcium 8.7 (L) 8.9 - 10.3 mg/dL    Total Protein 7.1 6.1 - 7.9 g/dL    Albumin 3.40 (L) 3.50 - 4.80 g/dL    ALT (SGPT) 16 14 - 54 U/L    AST (SGOT) 22 15 - 41 U/L    Alkaline Phosphatase 78 32 - 91 U/L    Total Bilirubin 0.5 0.3 - 1.2 mg/dL    eGFR Non African Amer 51 (L) >60 mL/min/1.73    Globulin 3.7 2.5 - 3.8 gm/dL    A/G Ratio 0.9 (L) 1.0 - 1.7 g/dL    BUN/Creatinine Ratio 18.2 5.4 - 26.2    Anion Gap 13.8 5.0 - 15.0 mmol/L   Protime-INR   Result Value Ref Range    Protime 10.9 9.6 - 11.7 Seconds    INR 1.07 0.90 - 1.10   BNP   Result Value Ref Range    .0 (H) <=100.0 pg/mL   Troponin   Result Value Ref Range    Troponin I <0.030 0.000 - 0.030 ng/mL   CBC Auto Differential   Result Value Ref Range    WBC 9.20 3.40 - 10.80 10*3/mm3    RBC 3.03 (L) 3.77 - 5.28 10*6/mm3    Hemoglobin 9.5 (L) 12.0 - 15.9 g/dL    Hematocrit 28.7 (L) 34.0 - 46.6 %    MCV 94.7 79.0 - 97.0 fL    MCH 31.2 26.6 - 33.0 pg    MCHC 32.9 31.5 - 35.7 g/dL    RDW 16.8 (H) 12.3 - 15.4 %    RDW-SD 56.0 (H) 37.0 - 54.0 fl    MPV 7.5 6.0 - 12.0 fL    Platelets 288 140 - 450 10*3/mm3    Neutrophil % 85.8 (H) 42.7 - 76.0 %    Lymphocyte % 7.4 (L) 19.6 - 45.3 %    Monocyte % 5.5 5.0 - 12.0 %    Eosinophil % 1.2 0.3 - 6.2 %    Basophil % 0.1 0.0 - 1.5 %    Neutrophils, Absolute 7.90 (H) 1.70 - 7.00 10*3/mm3     Lymphocytes, Absolute 0.70 0.70 - 3.10 10*3/mm3    Monocytes, Absolute 0.50 0.10 - 0.90 10*3/mm3    Eosinophils, Absolute 0.10 0.00 - 0.40 10*3/mm3    Basophils, Absolute 0.00 0.00 - 0.20 10*3/mm3    nRBC 0.1 0.0 - 0.2 /100 WBC   Urinalysis With Culture If Indicated - Urine, Catheter   Result Value Ref Range    Color, UA Yellow Yellow, Straw    Appearance, UA Clear Clear    pH, UA 7.0 5.0 - 8.0    Specific Gravity, UA 1.013 1.005 - 1.030    Glucose, UA Negative Negative    Ketones, UA Negative Negative    Bilirubin, UA Negative Negative    Blood, UA Negative Negative    Protein, UA Negative Negative    Leuk Esterase, UA Negative Negative    Nitrite, UA Negative Negative    Urobilinogen, UA 0.2 E.U./dL 0.2 - 1.0 E.U./dL   POC Lactate   Result Value Ref Range    Lactate 0.6 0.5 - 2.0 mmol/L     Ct Head Without Contrast    Result Date: 9/20/2019   1. No acute intracranial finding, no hemorrhage, mass effect or edema 2. Findings suspicious for bilateral mastoiditis with otitis media on the right and moderate amount of cerumen in the external right auditory canal  Electronically Signed By-Brad Nunez Jr. On:9/20/2019 1:58 PM This report was finalized on [id number] by  Brad Nunez Jr., .    Xr Chest 1 View    Result Date: 9/20/2019   1. Increasing mild subsegmental atelectasis in the right lung base with mild elevation of the right hemidiaphragm 2. Examination of the chest is otherwise stable from July 28, 2019  Electronically Signed By-Brad Nunez Jr. On:9/20/2019 12:57 PM This report was finalized on [id number] by  Brad Nunez Jr., .    Medications   cefTRIAXone (ROCEPHIN) in SWFI 1 gram/10ml IV PUSH syringe (not administered)         EKG my interpretation normal sinus rhythm rate of 78 normal axis no hypertrophy QTC of 425 normal EKG          MDM  Number of Diagnoses or Management Options  Episode of confusion:   Mastoiditis of both sides:   Diagnosis management comments: Medical decision making.  Patient had  her port access placed on a cardiac monitor and had the above exam and evaluation.  Patient CBC unremarkable hemoglobin 9.5 her sodium was 132 creatinine 1.1  chest x-ray increasing atelectasis of the right base the patient troponin was negative EKG normal urine was negative chest x-ray as noted above CT scan showed no acute hemorrhage patient does have bilateral mastoiditis.  He has no calcification from a previous hemorrhage but nothing acute per radiology reading.  Patient repeat exam was resting comfortably she had an unremarkable neurological exam family reports she is at her baseline mental status.  I do not see any evidence of pneumonia there is no UTI her sodium looks okay.  Troponin was negative.  She has some mastoiditis she was given Rocephin 1 g IV I see no evidence of an acute stroke based on clinical exam no evidence of meningitis based on clinical exam.  Patient desires to go home we had recommended admission for IV antibiotics but desires to go home family very comfortable daughter works up on PCU and can watch her closely and follow-up on cultures she gets worse she will bring her back to the hospital.  Patient was stable to be discharged home for outpatient management.      Final diagnoses:   Mastoiditis of both sides   Episode of confusion              Silverio Vargas MD  09/20/19 8183

## 2019-09-25 LAB
BACTERIA SPEC AEROBE CULT: NORMAL
BACTERIA SPEC AEROBE CULT: NORMAL

## 2019-09-27 ENCOUNTER — OFFICE VISIT (OUTPATIENT)
Dept: PAIN MEDICINE | Facility: CLINIC | Age: 59
End: 2019-09-27

## 2019-09-27 VITALS
HEART RATE: 65 BPM | RESPIRATION RATE: 16 BRPM | BODY MASS INDEX: 16.56 KG/M2 | TEMPERATURE: 97.8 F | OXYGEN SATURATION: 97 % | SYSTOLIC BLOOD PRESSURE: 183 MMHG | HEIGHT: 62 IN | DIASTOLIC BLOOD PRESSURE: 115 MMHG | WEIGHT: 90 LBS

## 2019-09-27 DIAGNOSIS — M79.18 MYOFASCIAL PAIN SYNDROME: ICD-10-CM

## 2019-09-27 DIAGNOSIS — M54.16 LUMBAR RADICULITIS: ICD-10-CM

## 2019-09-27 DIAGNOSIS — G89.3 CHRONIC PAIN DUE TO NEOPLASM: ICD-10-CM

## 2019-09-27 DIAGNOSIS — M47.817 LUMBOSACRAL SPONDYLOSIS WITHOUT MYELOPATHY: Primary | ICD-10-CM

## 2019-09-27 DIAGNOSIS — Z79.899 HIGH RISK MEDICATION USE: ICD-10-CM

## 2019-09-27 DIAGNOSIS — M25.50 POLYARTHRALGIA: ICD-10-CM

## 2019-09-27 DIAGNOSIS — F19.90 CURRENT DRUG USE: Primary | ICD-10-CM

## 2019-09-27 PROCEDURE — G0463 HOSPITAL OUTPT CLINIC VISIT: HCPCS | Performed by: ANESTHESIOLOGY

## 2019-09-27 PROCEDURE — 99214 OFFICE O/P EST MOD 30 MIN: CPT | Performed by: ANESTHESIOLOGY

## 2019-09-27 RX ORDER — ALBUTEROL SULFATE 2.5 MG/3ML
2.5 SOLUTION RESPIRATORY (INHALATION) EVERY 4 HOURS PRN
COMMUNITY
Start: 2019-08-28 | End: 2019-12-12

## 2019-09-27 RX ORDER — OXYCODONE HYDROCHLORIDE 10 MG/1
10 TABLET ORAL 4 TIMES DAILY PRN
Qty: 120 TABLET | Refills: 0 | Status: SHIPPED | OUTPATIENT
Start: 2019-09-27 | End: 2019-11-21 | Stop reason: SDUPTHER

## 2019-09-27 RX ORDER — OXYCODONE HYDROCHLORIDE 10 MG/1
10 TABLET ORAL 4 TIMES DAILY PRN
Qty: 120 TABLET | Refills: 0 | Status: SHIPPED | OUTPATIENT
Start: 2019-09-27 | End: 2019-09-27 | Stop reason: SDUPTHER

## 2019-09-27 NOTE — PROGRESS NOTES
Subjective    CC multiple joint pain, muscle pain, back pain, right leg pain    Sri Bobo is a 59 y.o. female with tonsillar cancer/erosive esophagitis/dysphagia S/p chemo/radiation, in remission,  R femur Fx, S/P ORIF, chronic back pain here follow up.  Repeat LESI last visit with good relief.  As noted worsening pain on the left side  Chronic low back pain radiating to bilateral hips bilateral lower extremity, exacerbated by activity and relieved by rest.  Denies new injury, saddle anesthesia, bladder or bowel incontinence.   Chronic generalized myofascial pain and polyarthralgia.   Worsening right lower extremity radicular pain.  With repeat LESI.      Utilizes oxycodone with  mild relief functional benefit denies side effects.    Takes medication/ nutrition per tube  Good releif with LESi      L-spine x-ray 2017: Anterolisthesis of L4 on L5, facet arthropathy in the lower lumbar spine. Degenerative changes L5-S1.  Fracture deformity proximal sacrum that may relate to an insufficiency fracture.  This is suggested on prior exam.  L-spine MRI 2017: Bilateral L4 pars defects with 11 mm of anterolisthesis of L4 on L5 resulting in moderate bilateral neural foraminal narrowing, right greater than left and moderate spinal canal stenosis. Multilevel degenerative disc disease and degenerative facet changes detailed above with only mild spinal canal stenosis at the L2/3.    Pain Assessment   Location of Pain: Lower Back, R Hip, L Hip, Pancho Leg, neck pain, joint  Description of Pain: Dull/Aching, Throbbing, Stabbing  Previous Pain Rating :8  Current Pain Ratin  Aggravating Factors: Activity  Alleviating Factors: Rest, Medication    The following portions of the patient's history were reviewed and updated as appropriate: allergies, current medications, past family history, past medical history, past social history, past surgical history and problem list.   has a past medical history of Anemia, Broken hip  (CMS/[facility]) (2019), Cerebral hemorrhage (CMS/[facility]), Dislocated shoulder (2017), Fall (2018), Gastrostomy tube in place (CMS/[facility]), GERD (gastroesophageal reflux disease),  1 para 1, Hip pain, History of right common carotid artery stent placement, Hypertension, Pneumonia (2018), Renal failure, chronic, stage 3 (moderate) (CMS/[facility]), Seizures (CMS/[facility]), Shingles (2017), Tonsillar cancer (CMS/[facility]), and Vitamin B 12 deficiency.     has a past surgical history that includes Bunionectomy; Carotid stent (Right); Gastrostomy tube placement; Esophageal dilation; Wrist fracture surgery (Left, ); ORIF radius & ulna fractures (Right); and Hip surgery (Right, 2019).  Social History     Tobacco Use   • Smoking status: Former Smoker   • Smokeless tobacco: Never Used   • Tobacco comment: 5 years stopped smoking   Substance Use Topics   • Alcohol use: No     Frequency: Never     Review of Systems   Constitutional: Negative for chills, fatigue and fever.   HENT: Negative for swollen glands and trouble swallowing.    Respiratory: Negative.  Negative for shortness of breath.    Cardiovascular: Negative for chest pain, palpitations and leg swelling.   Gastrointestinal: Negative for nausea and vomiting.   Musculoskeletal: Positive for arthralgias, back pain and gait problem. Negative for joint swelling, myalgias, neck pain and neck stiffness.   Skin: Negative for color change, dry skin, rash and skin lesions.   Neurological: Negative for dizziness, weakness, light-headedness and headache.   Hematological: Does not bruise/bleed easily.   Psychiatric/Behavioral: Negative for behavioral problems, suicidal ideas and depressed mood.   All other systems reviewed and are negative.      Objective   Physical Exam   Constitutional: She is oriented to person, place, and time. She appears well-developed. She has a sickly appearance. No distress.   Eyes: Conjunctivae are normal. Pupils are equal, round, and reactive to light.  "  Neck: Normal range of motion and full passive range of motion without pain. No Kernig's sign noted.   Cardiovascular: Normal heart sounds and intact distal pulses.   Pulmonary/Chest: Effort normal and breath sounds normal.   Musculoskeletal:        Right hip: She exhibits tenderness.        Lumbar back: She exhibits decreased range of motion, tenderness and spasm.   Back: Paraspinal tenderness, positive leg raise on the right.  Pain with extension/side rotation.      Vascular Status -  Her right foot exhibits no edema. Her left foot exhibits no edema.  Lymphadenopathy:     She has no cervical adenopathy.   Neurological: She is alert and oriented to person, place, and time. She has normal strength. No sensory deficit. Gait abnormal. Coordination normal.   Reflex Scores:       Bicep reflexes are 1+ on the right side and 1+ on the left side.       Brachioradialis reflexes are 1+ on the right side and 1+ on the left side.       Patellar reflexes are 1+ on the right side and 1+ on the left side.       Achilles reflexes are 1+ on the right side and 1+ on the left side.  Antalgic gait/cane   Skin: Skin is warm and dry. Capillary refill takes less than 2 seconds.   Psychiatric: She has a normal mood and affect. Her behavior is normal.   Vitals reviewed.    Vitals:    09/27/19 1126   BP: (!) 183/115   Pulse: 65   Resp: 16   Temp: 97.8 °F (36.6 °C)   SpO2: 97%   Weight: 40.8 kg (90 lb)   Height: 157.5 cm (62\")     Global pain scale and PHQ 9 on chart  Opioid risk tool low risk    Assessment/Plan   Sri was seen today for hip pain.    Diagnoses and all orders for this visit:    Lumbosacral spondylosis without myelopathy    Lumbar radiculitis    Polyarthralgia    Myofascial pain syndrome    High risk medication use    Chronic pain due to neoplasm    Other orders  -     Discontinue: oxyCODONE (ROXICODONE) 10 MG tablet; Take 1 tablet by mouth 4 (Four) Times a Day As Needed for Moderate Pain .  -     oxyCODONE (ROXICODONE) 10 " MG tablet; Take 1 tablet by mouth 4 (Four) Times a Day As Needed for Moderate Pain .      Summary    59-year-old female with tonsillar cancer/erosive esophagitis/dysphagia S/p chemo/radiation, in remission, fall S/P RLE ORIF,  chronic back pain  seen in follow-up.  Chronic  malignant pain and chronic back pain from lumbar DDD / spondylosis /stenosis with radiculitis.  Good relief with repeat LESI      Continue oxycodone 10 mg 4 times daily as needed for severe pain.  UDS and Inspect reviewed  Discussed risk of tolerance, dependence, respiratory depression, coma and death associated with use of oral opioids for treatment of chronic nonmalignant pain.      RTC in 2 mo.

## 2019-10-01 RX ORDER — LEVETIRACETAM 500 MG/1
TABLET ORAL
Qty: 60 TABLET | Refills: 0 | Status: SHIPPED | OUTPATIENT
Start: 2019-10-01 | End: 2019-11-11 | Stop reason: SDUPTHER

## 2019-10-01 RX ORDER — CLOPIDOGREL BISULFATE 75 MG/1
TABLET ORAL
Qty: 30 TABLET | Refills: 0 | Status: SHIPPED | OUTPATIENT
Start: 2019-10-01 | End: 2019-11-02 | Stop reason: SDUPTHER

## 2019-10-22 ENCOUNTER — OFFICE VISIT (OUTPATIENT)
Dept: FAMILY MEDICINE CLINIC | Facility: CLINIC | Age: 59
End: 2019-10-22

## 2019-10-22 VITALS
TEMPERATURE: 98 F | BODY MASS INDEX: 15.53 KG/M2 | WEIGHT: 84.4 LBS | OXYGEN SATURATION: 95 % | DIASTOLIC BLOOD PRESSURE: 70 MMHG | HEIGHT: 62 IN | RESPIRATION RATE: 14 BRPM | SYSTOLIC BLOOD PRESSURE: 105 MMHG | HEART RATE: 84 BPM

## 2019-10-22 DIAGNOSIS — J18.9 PNEUMONIA OF RIGHT LOWER LOBE DUE TO INFECTIOUS ORGANISM: Primary | ICD-10-CM

## 2019-10-22 PROBLEM — R42 DIZZINESS: Status: RESOLVED | Noted: 2019-07-28 | Resolved: 2019-10-22

## 2019-10-22 PROCEDURE — 99213 OFFICE O/P EST LOW 20 MIN: CPT | Performed by: FAMILY MEDICINE

## 2019-10-22 RX ORDER — CEFDINIR 250 MG/5ML
300 POWDER, FOR SUSPENSION ORAL 2 TIMES DAILY
Qty: 120 ML | Refills: 0 | Status: SHIPPED | OUTPATIENT
Start: 2019-10-22 | End: 2019-11-25

## 2019-10-22 NOTE — PATIENT INSTRUCTIONS
Have the tests done and call for resaults.    Take the anti-biotics as prescribed.    Cointinue your other medications and treatment.    Follow up in the office in 1 week.

## 2019-10-22 NOTE — PROGRESS NOTES
"Subjective   Sri Bobo is a 59 y.o. female.     Chief Complaint   Patient presents with   • URI   • Fatigue   • Shortness of Breath   • Cough       HPI  Chief complaint : Cough and chest congestion    The patient is a 5-year-old white female states that for the past 3 days she has had cough and chest congestion.  She states her cough is productive of sputum.  She denies fever chills.  She denies shortness of breath.  Does complain of increased weakness.  Patient states however she is been eating is been getting around okay at home.        The following portions of the patient's history were reviewed and updated as appropriate: allergies, current medications, past family history, past medical history, past social history, past surgical history and problem list.    Review of Systems    Objective     /70 (BP Location: Left arm, Patient Position: Sitting, Cuff Size: Adult)   Pulse 84   Temp 98 °F (36.7 °C) (Oral)   Resp 14   Ht 157.5 cm (62\")   Wt 38.3 kg (84 lb 6.4 oz)   SpO2 95%   BMI 15.44 kg/m²     Physical Exam   Constitutional: She is oriented to person, place, and time.   Thin and frail   HENT:   Head: Normocephalic and atraumatic.   Eyes: Conjunctivae and EOM are normal. Pupils are equal, round, and reactive to light.   Neck: Normal range of motion. Neck supple.   Cardiovascular: Normal rate, regular rhythm, normal heart sounds and intact distal pulses.   Pulmonary/Chest: Effort normal. She has rales in the right middle field and the right lower field.   Abdominal: Soft. Bowel sounds are normal.   Musculoskeletal: Normal range of motion.   Neurological: She is alert and oriented to person, place, and time.   Skin: Skin is warm and dry.   Psychiatric: She has a normal mood and affect. Her behavior is normal.   Nursing note and vitals reviewed.        Assessment/Plan   Sri was seen today for uri, fatigue, shortness of breath and cough.    Diagnoses and all orders for this visit:    Pneumonia " of right lower lobe due to infectious organism (CMS/HCC)  -     XR Chest PA & Lateral; Future  -     CBC & Differential; Future  -     Procalcitonin; Future  -     Comprehensive Metabolic Panel; Future    Other orders  -     cefdinir (OMNICEF) 250 MG/5ML suspension; Take 6 mL by mouth 2 (Two) Times a Day.      Patient Instructions   Have the tests done and call for resaults.    Take the anti-biotics as prescribed.    Cointinue your other medications and treatment.    Follow up in the office in 1 week.      Leonid Villeda Jr., MD    10/22/19

## 2019-10-23 ENCOUNTER — HOSPITAL ENCOUNTER (OUTPATIENT)
Dept: GENERAL RADIOLOGY | Facility: HOSPITAL | Age: 59
Discharge: HOME | End: 2019-10-23
Admitting: FAMILY MEDICINE

## 2019-10-23 ENCOUNTER — HOSPITAL ENCOUNTER (OUTPATIENT)
Dept: INFUSION THERAPY | Facility: HOSPITAL | Age: 59
Discharge: HOME | End: 2019-10-23

## 2019-10-23 ENCOUNTER — LAB (OUTPATIENT)
Dept: LAB | Facility: HOSPITAL | Age: 59
End: 2019-10-23

## 2019-10-23 DIAGNOSIS — K22.10 EROSIVE ESOPHAGITIS: Primary | ICD-10-CM

## 2019-10-23 DIAGNOSIS — J18.9 PNEUMONIA OF RIGHT LOWER LOBE DUE TO INFECTIOUS ORGANISM: ICD-10-CM

## 2019-10-23 DIAGNOSIS — C09.0 MALIGNANT NEOPLASM OF TONSILLAR FOSSA (HCC): ICD-10-CM

## 2019-10-23 LAB
ALBUMIN SERPL-MCNC: 3.8 G/DL (ref 3.5–5.2)
ALBUMIN/GLOB SERPL: 1 G/DL
ALP SERPL-CCNC: 79 U/L (ref 39–117)
ALT SERPL W P-5'-P-CCNC: 7 U/L (ref 1–33)
ANION GAP SERPL CALCULATED.3IONS-SCNC: 11 MMOL/L (ref 5–15)
AST SERPL-CCNC: 13 U/L (ref 1–32)
B PERT DNA SPEC QL NAA+PROBE: NOT DETECTED
BASOPHILS # BLD AUTO: 0.02 10*3/MM3 (ref 0–0.2)
BASOPHILS NFR BLD AUTO: 0.2 % (ref 0–1.5)
BILIRUB SERPL-MCNC: 0.2 MG/DL (ref 0.2–1.2)
BUN BLD-MCNC: 39 MG/DL (ref 8–23)
BUN/CREAT SERPL: 31.5 (ref 7–25)
C PNEUM DNA NPH QL NAA+NON-PROBE: NOT DETECTED
CALCIUM SPEC-SCNC: 9.5 MG/DL (ref 8.6–10.5)
CHLORIDE SERPL-SCNC: 93 MMOL/L (ref 98–107)
CO2 SERPL-SCNC: 26 MMOL/L (ref 22–29)
CREAT BLD-MCNC: 1.24 MG/DL (ref 0.57–1)
DEPRECATED RDW RBC AUTO: 48.8 FL (ref 37–54)
EOSINOPHIL # BLD AUTO: 0.32 10*3/MM3 (ref 0–0.4)
EOSINOPHIL NFR BLD AUTO: 3.5 % (ref 0.3–6.2)
ERYTHROCYTE [DISTWIDTH] IN BLOOD BY AUTOMATED COUNT: 14.9 % (ref 12.3–15.4)
FLUAV H1 2009 PAND RNA NPH QL NAA+PROBE: NOT DETECTED
FLUAV H1 HA GENE NPH QL NAA+PROBE: NOT DETECTED
FLUAV H3 RNA NPH QL NAA+PROBE: NOT DETECTED
FLUAV SUBTYP SPEC NAA+PROBE: NOT DETECTED
FLUBV RNA ISLT QL NAA+PROBE: NOT DETECTED
GFR SERPL CREATININE-BSD FRML MDRD: 44 ML/MIN/1.73
GLOBULIN UR ELPH-MCNC: 4 GM/DL
GLUCOSE BLD-MCNC: 114 MG/DL (ref 65–99)
HADV DNA SPEC NAA+PROBE: NOT DETECTED
HCOV 229E RNA SPEC QL NAA+PROBE: NOT DETECTED
HCOV HKU1 RNA SPEC QL NAA+PROBE: NOT DETECTED
HCOV NL63 RNA SPEC QL NAA+PROBE: NOT DETECTED
HCOV OC43 RNA SPEC QL NAA+PROBE: NOT DETECTED
HCT VFR BLD AUTO: 28.4 % (ref 34–46.6)
HGB BLD-MCNC: 9.2 G/DL (ref 12–15.9)
HMPV RNA NPH QL NAA+NON-PROBE: NOT DETECTED
HPIV1 RNA SPEC QL NAA+PROBE: NOT DETECTED
HPIV2 RNA SPEC QL NAA+PROBE: NOT DETECTED
HPIV3 RNA NPH QL NAA+PROBE: NOT DETECTED
HPIV4 P GENE NPH QL NAA+PROBE: NOT DETECTED
IMM GRANULOCYTES # BLD AUTO: 0.13 10*3/MM3 (ref 0–0.05)
IMM GRANULOCYTES NFR BLD AUTO: 1.4 % (ref 0–0.5)
LYMPHOCYTES # BLD AUTO: 0.97 10*3/MM3 (ref 0.7–3.1)
LYMPHOCYTES NFR BLD AUTO: 10.7 % (ref 19.6–45.3)
M PNEUMO IGG SER IA-ACNC: NOT DETECTED
MCH RBC QN AUTO: 29.4 PG (ref 26.6–33)
MCHC RBC AUTO-ENTMCNC: 32.4 G/DL (ref 31.5–35.7)
MCV RBC AUTO: 90.7 FL (ref 79–97)
MONOCYTES # BLD AUTO: 0.78 10*3/MM3 (ref 0.1–0.9)
MONOCYTES NFR BLD AUTO: 8.6 % (ref 5–12)
NEUTROPHILS # BLD AUTO: 6.81 10*3/MM3 (ref 1.7–7)
NEUTROPHILS NFR BLD AUTO: 75.6 % (ref 42.7–76)
NRBC BLD AUTO-RTO: 0 /100 WBC (ref 0–0.2)
PLATELET # BLD AUTO: 318 10*3/MM3 (ref 140–450)
PMV BLD AUTO: 9.5 FL (ref 6–12)
POTASSIUM BLD-SCNC: 4.3 MMOL/L (ref 3.5–5.2)
PROCALCITONIN SERPL-MCNC: 0.65 NG/ML (ref 0.1–0.25)
PROT SERPL-MCNC: 7.8 G/DL (ref 6–8.5)
RBC # BLD AUTO: 3.13 10*6/MM3 (ref 3.77–5.28)
RHINOVIRUS RNA SPEC NAA+PROBE: NOT DETECTED
RSV RNA NPH QL NAA+NON-PROBE: NOT DETECTED
SODIUM BLD-SCNC: 130 MMOL/L (ref 136–145)
WBC NRBC COR # BLD: 9.03 10*3/MM3 (ref 3.4–10.8)

## 2019-10-23 PROCEDURE — 0099U HC BIOFIRE FILMARRAY RESP PANEL 1: CPT

## 2019-10-23 PROCEDURE — 84145 PROCALCITONIN (PCT): CPT

## 2019-10-23 PROCEDURE — 80053 COMPREHEN METABOLIC PANEL: CPT

## 2019-10-23 PROCEDURE — 71046 X-RAY EXAM CHEST 2 VIEWS: CPT

## 2019-10-23 PROCEDURE — 36591 DRAW BLOOD OFF VENOUS DEVICE: CPT

## 2019-10-23 PROCEDURE — 85025 COMPLETE CBC W/AUTO DIFF WBC: CPT

## 2019-10-23 PROCEDURE — 36415 COLL VENOUS BLD VENIPUNCTURE: CPT

## 2019-10-23 RX ORDER — HEPARIN SODIUM (PORCINE) LOCK FLUSH IV SOLN 100 UNIT/ML 100 UNIT/ML
500 SOLUTION INTRAVENOUS AS NEEDED
OUTPATIENT
Start: 2019-10-23

## 2019-10-23 RX ORDER — SODIUM CHLORIDE 0.9 % (FLUSH) 0.9 %
20 SYRINGE (ML) INJECTION AS NEEDED
OUTPATIENT
Start: 2019-10-23

## 2019-10-23 RX ORDER — SODIUM CHLORIDE 0.9 % (FLUSH) 0.9 %
20 SYRINGE (ML) INJECTION AS NEEDED
Status: DISCONTINUED | OUTPATIENT
Start: 2019-10-23 | End: 2019-10-25 | Stop reason: HOSPADM

## 2019-10-23 RX ADMIN — HEPARIN SODIUM (PORCINE) LOCK FLUSH IV SOLN 100 UNIT/ML 500 UNITS: 100 SOLUTION at 14:48

## 2019-10-23 RX ADMIN — Medication 20 ML: at 14:48

## 2019-10-30 ENCOUNTER — OFFICE VISIT (OUTPATIENT)
Dept: FAMILY MEDICINE CLINIC | Facility: CLINIC | Age: 59
End: 2019-10-30

## 2019-10-30 VITALS
HEIGHT: 62 IN | SYSTOLIC BLOOD PRESSURE: 133 MMHG | RESPIRATION RATE: 16 BRPM | BODY MASS INDEX: 16.08 KG/M2 | TEMPERATURE: 98 F | WEIGHT: 87.4 LBS | OXYGEN SATURATION: 99 % | DIASTOLIC BLOOD PRESSURE: 78 MMHG | HEART RATE: 71 BPM

## 2019-10-30 DIAGNOSIS — J18.9 COMMUNITY ACQUIRED PNEUMONIA OF RIGHT LOWER LOBE OF LUNG: Primary | ICD-10-CM

## 2019-10-30 PROCEDURE — 99212 OFFICE O/P EST SF 10 MIN: CPT | Performed by: FAMILY MEDICINE

## 2019-10-30 RX ORDER — CYANOCOBALAMIN 1000 UG/ML
1000 INJECTION, SOLUTION INTRAMUSCULAR; SUBCUTANEOUS
Qty: 10 ML | Refills: 1 | Status: SHIPPED | OUTPATIENT
Start: 2019-10-30

## 2019-10-30 NOTE — PROGRESS NOTES
"Subjective   Sri Bobo is a 59 y.o. female.     Chief Complaint   Patient presents with   • Pneumonia     2 WEEK F/U       HPI  Chief complaint: Pneumonia    Patient is a 5-year-old white female comes in for follow-up and maintenance of her current problems which include    1.  Pneumonia-improved-patient seen by me 2 weeks ago because of pneumonia.  She was treated with antibiotics.  States he feels better.  She states she no longer has cough shortness of breath or fever.        The following portions of the patient's history were reviewed and updated as appropriate: allergies, current medications, past family history, past medical history, past social history, past surgical history and problem list.    Review of Systems    Objective     /78 (BP Location: Left arm, Patient Position: Sitting, Cuff Size: Adult)   Pulse 71   Temp 98 °F (36.7 °C) (Oral)   Resp 16   Ht 157.5 cm (62\")   Wt 39.6 kg (87 lb 6.4 oz)   SpO2 99%   BMI 15.99 kg/m²     Physical Exam   Constitutional: She is oriented to person, place, and time.   Thin, frail, chronically ill appearing   HENT:   Head: Normocephalic and atraumatic.   Carious teeth   Eyes: Conjunctivae and EOM are normal. Pupils are equal, round, and reactive to light.   Neck: Normal range of motion. Neck supple.   Cardiovascular: Normal rate, regular rhythm, normal heart sounds and intact distal pulses.   Pulmonary/Chest: Effort normal. She has rales in the right lower field.   Abdominal: Soft. Bowel sounds are normal.   Musculoskeletal: Normal range of motion.   Neurological: She is alert and oriented to person, place, and time.   Skin: Skin is warm and dry.   Psychiatric: She has a normal mood and affect. Her behavior is normal.   Nursing note and vitals reviewed.        Assessment/Plan   Sri was seen today for pneumonia.    Diagnoses and all orders for this visit:    Community acquired pneumonia of right lower lobe of lung (CMS/HCC)    Other orders  -     " cyanocobalamin 1000 MCG/ML injection; Inject 1 mL into the appropriate muscle as directed by prescriber Every 28 (Twenty-Eight) Days.      Patient Instructions   Continue your current medications and treatment.    Follow up in the office in 3 months.          Leonid Villeda Jr., MD    10/30/19

## 2019-11-03 RX ORDER — CLOPIDOGREL BISULFATE 75 MG/1
TABLET ORAL
Qty: 30 TABLET | Refills: 0 | Status: SHIPPED | OUTPATIENT
Start: 2019-11-03 | End: 2019-11-25

## 2019-11-11 RX ORDER — LEVETIRACETAM 500 MG/1
TABLET ORAL
Qty: 60 TABLET | Refills: 0 | Status: SHIPPED | OUTPATIENT
Start: 2019-11-11 | End: 2019-11-14 | Stop reason: SDUPTHER

## 2019-11-14 RX ORDER — FAMOTIDINE 40 MG/1
TABLET, FILM COATED ORAL
Qty: 30 TABLET | Refills: 1 | Status: SHIPPED | OUTPATIENT
Start: 2019-11-14 | End: 2019-11-25

## 2019-11-14 RX ORDER — AMITRIPTYLINE HYDROCHLORIDE 50 MG/1
TABLET, FILM COATED ORAL
Qty: 180 TABLET | Refills: 0 | Status: SHIPPED | OUTPATIENT
Start: 2019-11-14 | End: 2019-11-25

## 2019-11-14 RX ORDER — LEVETIRACETAM 500 MG/1
TABLET ORAL
Qty: 60 TABLET | Refills: 0 | Status: SHIPPED | OUTPATIENT
Start: 2019-11-14 | End: 2019-11-25

## 2019-11-21 ENCOUNTER — OFFICE VISIT (OUTPATIENT)
Dept: PAIN MEDICINE | Facility: CLINIC | Age: 59
End: 2019-11-21

## 2019-11-21 VITALS
HEIGHT: 62 IN | HEART RATE: 102 BPM | TEMPERATURE: 97.5 F | SYSTOLIC BLOOD PRESSURE: 135 MMHG | RESPIRATION RATE: 16 BRPM | DIASTOLIC BLOOD PRESSURE: 83 MMHG | OXYGEN SATURATION: 99 % | BODY MASS INDEX: 16.01 KG/M2 | WEIGHT: 87 LBS

## 2019-11-21 DIAGNOSIS — G89.4 CHRONIC PAIN SYNDROME: Primary | ICD-10-CM

## 2019-11-21 DIAGNOSIS — M47.817 LUMBOSACRAL SPONDYLOSIS WITHOUT MYELOPATHY: ICD-10-CM

## 2019-11-21 DIAGNOSIS — M54.16 LUMBAR RADICULITIS: ICD-10-CM

## 2019-11-21 DIAGNOSIS — M79.2 NEUROPATHIC PAIN: ICD-10-CM

## 2019-11-21 DIAGNOSIS — M25.50 POLYARTHRALGIA: ICD-10-CM

## 2019-11-21 DIAGNOSIS — Z79.899 HIGH RISK MEDICATION USE: ICD-10-CM

## 2019-11-21 PROCEDURE — G0463 HOSPITAL OUTPT CLINIC VISIT: HCPCS | Performed by: ANESTHESIOLOGY

## 2019-11-21 PROCEDURE — 99214 OFFICE O/P EST MOD 30 MIN: CPT | Performed by: ANESTHESIOLOGY

## 2019-11-21 RX ORDER — TIZANIDINE 4 MG/1
4 TABLET ORAL EVERY 8 HOURS PRN
Qty: 90 TABLET | Refills: 5 | Status: SHIPPED | OUTPATIENT
Start: 2019-11-21 | End: 2019-11-25

## 2019-11-21 RX ORDER — OXYCODONE HYDROCHLORIDE 10 MG/1
10 TABLET ORAL 4 TIMES DAILY PRN
Qty: 120 TABLET | Refills: 0 | Status: SHIPPED | OUTPATIENT
Start: 2019-11-21

## 2019-11-21 RX ORDER — OXYCODONE HYDROCHLORIDE 10 MG/1
10 TABLET ORAL 4 TIMES DAILY PRN
Qty: 120 TABLET | Refills: 0 | Status: SHIPPED | OUTPATIENT
Start: 2019-11-21 | End: 2019-11-21 | Stop reason: SDUPTHER

## 2019-11-21 NOTE — PROGRESS NOTES
Subjective    CC multiple joint pain, muscle pain, back pain, right leg pain  Sri Bobo is a 59 y.o. female with peritonsillar cancer/erosive esophagitis/dysphagia S/p chemo/radiation, in remission,  R femur Fx, S/P ORIF, chronic back pain here follow up.  Worsening speech due to contractures and adequate mouth opening due to previous surgeries.  Has seen PCP also has oncology evaluation pending next month.  Chronic low back pain radiating to bilateral hips bilateral lower extremity, exacerbated by activity and relieved by rest.  Denies new injury, saddle anesthesia, bladder or bowel incontinence.   Chronic generalized myofascial pain and polyarthralgia.   Worsening right lower extremity radicular pain.  With repeat LESI.      Utilizes oxycodone with  mild relief functional benefit denies side effects.    Takes medication/ nutrition per tube  Good releif with LESi      L-spine x-ray 2017: Anterolisthesis of L4 on L5, facet arthropathy in the lower lumbar spine. Degenerative changes L5-S1.  Fracture deformity proximal sacrum that may relate to an insufficiency fracture.  This is suggested on prior exam.  L-spine MRI 2017: Bilateral L4 pars defects with 11 mm of anterolisthesis of L4 on L5 resulting in moderate bilateral neural foraminal narrowing, right greater than left and moderate spinal canal stenosis. Multilevel degenerative disc disease and degenerative facet changes detailed above with only mild spinal canal stenosis at the L2/3.    Pain Assessment   Location of Pain: Lower Back, R Hip, L Hip, Pancho Leg, neck pain, joint  Description of Pain: Dull/Aching, Throbbing, Stabbing  Previous Pain Rating :8  Current Pain Ratin  Aggravating Factors: Activity  Alleviating Factors: Rest, Medication  PEG Assessment   What number best describes your pain on average in the past week?9  What number best describes how, during the past week, pain has interfered with your enjoyment of life?6  What number best  describes how, during the past week, pain has interfered with your general activity? 9     The following portions of the patient's history were reviewed and updated as appropriate: allergies, current medications, past family history, past medical history, past social history, past surgical history and problem list.   has a past medical history of Anemia, Broken hip (CMS/[facility]) (2019), Cerebral hemorrhage (CMS/[facility]), Dislocated shoulder (2017), Fall (2018), Gastrostomy tube in place (CMS/[facility]), GERD (gastroesophageal reflux disease),  1 para 1, Hip pain, History of right common carotid artery stent placement, Hypertension, Pneumonia (2018), Renal failure, chronic, stage 3 (moderate) (CMS/[facility]), Seizures (CMS/[facility]), Shingles (2017), Tonsillar cancer (CMS/[facility]), and Vitamin B 12 deficiency.     has a past surgical history that includes Bunionectomy; Carotid stent (Right); Gastrostomy tube placement; Esophageal dilation; Wrist fracture surgery (Left, ); ORIF radius & ulna fractures (Right); and Hip surgery (Right, 2019).  Social History     Tobacco Use   • Smoking status: Former Smoker   • Smokeless tobacco: Never Used   • Tobacco comment: 5 years stopped smoking   Substance Use Topics   • Alcohol use: No     Frequency: Never     Review of Systems   Constitutional: Negative for chills, fatigue and fever.   HENT: Negative for swollen glands and trouble swallowing.    Respiratory: Negative.  Negative for shortness of breath.    Cardiovascular: Negative for chest pain, palpitations and leg swelling.   Gastrointestinal: Negative for nausea and vomiting.   Musculoskeletal: Positive for arthralgias, back pain and gait problem. Negative for joint swelling, myalgias, neck pain and neck stiffness.   Skin: Negative for color change, dry skin, rash and skin lesions.   Neurological: Negative for dizziness, weakness, light-headedness and headache.   Hematological: Negative for adenopathy. Does not bruise/bleed  "easily.   Psychiatric/Behavioral: Negative for behavioral problems, suicidal ideas and depressed mood.   All other systems reviewed and are negative.      Objective   Physical Exam   Constitutional: She is oriented to person, place, and time. She appears well-developed. She has a sickly appearance. No distress.   Eyes: Conjunctivae are normal. Pupils are equal, round, and reactive to light.   Neck: Muscular tenderness present. Decreased range of motion present. No Kernig's sign noted.   Scar and contracture right side neck from previous radiation/resections   Cardiovascular: Normal heart sounds and intact distal pulses.   Pulmonary/Chest: Effort normal and breath sounds normal.   Musculoskeletal:        Right hip: She exhibits tenderness.        Lumbar back: She exhibits decreased range of motion, tenderness and spasm.   Back: Paraspinal tenderness, positive leg raise on the right.  Pain with extension/side rotation.      Vascular Status -  Her right foot exhibits no edema. Her left foot exhibits no edema.  Lymphadenopathy:     She has no cervical adenopathy.   Neurological: She is alert and oriented to person, place, and time. She has normal strength. No sensory deficit. Gait abnormal. Coordination normal.   Reflex Scores:       Bicep reflexes are 1+ on the right side and 1+ on the left side.       Brachioradialis reflexes are 1+ on the right side and 1+ on the left side.       Patellar reflexes are 1+ on the right side and 1+ on the left side.       Achilles reflexes are 1+ on the right side and 1+ on the left side.  Antalgic gait/cane   Skin: Skin is warm and dry. Capillary refill takes less than 2 seconds.   Psychiatric: She has a normal mood and affect. Her behavior is normal.   Vitals reviewed.    Vitals:    11/21/19 0958   BP: 135/83   Pulse: 102   Resp: 16   Temp: 97.5 °F (36.4 °C)   SpO2: 99%   Weight: 39.5 kg (87 lb)   Height: 157.5 cm (62\")      PHQ 9 on chart  Opioid risk tool low risk    Assessment/Plan "   Sri was seen today for leg pain, back pain and follow-up.    Diagnoses and all orders for this visit:    Chronic pain syndrome    Lumbosacral spondylosis without myelopathy    Lumbar radiculitis    Neuropathic pain    Polyarthralgia    High risk medication use    Other orders  -     Discontinue: oxyCODONE (ROXICODONE) 10 MG tablet; Take 1 tablet by mouth 4 (Four) Times a Day As Needed for Moderate Pain .  -     oxyCODONE (ROXICODONE) 10 MG tablet; Take 1 tablet by mouth 4 (Four) Times a Day As Needed for Moderate Pain .  -     tiZANidine (ZANAFLEX) 4 MG tablet; Take 1 tablet by mouth Every 8 (Eight) Hours As Needed for Muscle Spasms.      Summary    59-year-old female with tonsillar cancer/erosive esophagitis/dysphagia S/p chemo/radiation, in remission, fall S/P RLE ORIF,  chronic back pain  seen in follow-up.  Chronic  malignant pain and chronic back pain from lumbar DDD / spondylosis /stenosis with radiculitis.  Good relief with repeat LESI. Repeat as needed.  Worsening paraspinal muscle spasm, start Zanaflex.      Worsening speech/mandibular mobility, history of prior surgery for resection of peritonsillar mass/erosive esophagitis.  Has follow-up with oncology next month.    Continue oxycodone 10 mg 4 times daily as needed for severe pain.  UDS and Inspect reviewed  Discussed risk of tolerance, dependence, respiratory depression, coma and death associated with use of oral opioids for treatment of chronic nonmalignant pain.      RTC in 2 mo.

## 2019-11-22 RX ORDER — CARVEDILOL 12.5 MG/1
TABLET ORAL
Qty: 60 TABLET | Refills: 1 | Status: SHIPPED | OUTPATIENT
Start: 2019-11-22 | End: 2019-11-25

## 2019-11-25 ENCOUNTER — APPOINTMENT (OUTPATIENT)
Dept: GENERAL RADIOLOGY | Facility: HOSPITAL | Age: 59
End: 2019-11-25

## 2019-11-25 ENCOUNTER — HOSPITAL ENCOUNTER (INPATIENT)
Facility: HOSPITAL | Age: 59
LOS: 5 days | Discharge: HOME | End: 2019-11-30
Attending: EMERGENCY MEDICINE | Admitting: SURGERY

## 2019-11-25 ENCOUNTER — APPOINTMENT (OUTPATIENT)
Dept: CT IMAGING | Facility: HOSPITAL | Age: 59
End: 2019-11-25

## 2019-11-25 DIAGNOSIS — J18.9 PNEUMONIA OF RIGHT LOWER LOBE DUE TO INFECTIOUS ORGANISM: ICD-10-CM

## 2019-11-25 DIAGNOSIS — A41.9 SEPSIS, DUE TO UNSPECIFIED ORGANISM, UNSPECIFIED WHETHER ACUTE ORGAN DYSFUNCTION PRESENT (HCC): ICD-10-CM

## 2019-11-25 DIAGNOSIS — J18.9 PNEUMONIA DUE TO INFECTIOUS ORGANISM, UNSPECIFIED LATERALITY, UNSPECIFIED PART OF LUNG: ICD-10-CM

## 2019-11-25 DIAGNOSIS — R06.00 DYSPNEA, UNSPECIFIED TYPE: ICD-10-CM

## 2019-11-25 DIAGNOSIS — E87.1 HYPONATREMIA: ICD-10-CM

## 2019-11-25 DIAGNOSIS — L89.329 PRESSURE SORE OF LEFT ISCHIAL AREA, UNSPECIFIED ULCER STAGE: Primary | ICD-10-CM

## 2019-11-25 DIAGNOSIS — L89.314 DECUBITUS ULCER OF ISCHIAL AREA, RIGHT, STAGE IV (HCC): ICD-10-CM

## 2019-11-25 DIAGNOSIS — S42.032A CLOSED DISPLACED FRACTURE OF ACROMIAL END OF LEFT CLAVICLE, INITIAL ENCOUNTER: Primary | ICD-10-CM

## 2019-11-25 DIAGNOSIS — D64.9 ANEMIA, UNSPECIFIED TYPE: ICD-10-CM

## 2019-11-25 LAB
ABO GROUP BLD: NORMAL
ALBUMIN SERPL-MCNC: 3.3 G/DL (ref 3.5–5.2)
ALBUMIN/GLOB SERPL: 0.9 G/DL
ALP SERPL-CCNC: 76 U/L (ref 39–117)
ALT SERPL W P-5'-P-CCNC: 7 U/L (ref 1–33)
ANION GAP SERPL CALCULATED.3IONS-SCNC: 9 MMOL/L (ref 5–15)
ARTERIAL PATENCY WRIST A: POSITIVE
AST SERPL-CCNC: 13 U/L (ref 1–32)
ATMOSPHERIC PRESS: ABNORMAL MM[HG]
BASE EXCESS BLDA CALC-SCNC: 2.3 MMOL/L (ref 0–3)
BASOPHILS # BLD AUTO: 0 10*3/MM3 (ref 0–0.2)
BASOPHILS NFR BLD AUTO: 0.1 % (ref 0–1.5)
BDY SITE: ABNORMAL
BILIRUB SERPL-MCNC: 0.2 MG/DL (ref 0.2–1.2)
BLD GP AB SCN SERPL QL: NEGATIVE
BUN BLD-MCNC: 25 MG/DL (ref 8–23)
BUN/CREAT SERPL: 20.3 (ref 7–25)
CALCIUM SPEC-SCNC: 8.4 MG/DL (ref 8.6–10.5)
CHLORIDE SERPL-SCNC: 90 MMOL/L (ref 98–107)
CO2 BLDA-SCNC: 27.6 MMOL/L (ref 22–29)
CO2 SERPL-SCNC: 27 MMOL/L (ref 22–29)
CREAT BLD-MCNC: 1.23 MG/DL (ref 0.57–1)
D-LACTATE SERPL-SCNC: 0.6 MMOL/L (ref 0.5–2)
D-LACTATE SERPL-SCNC: <0.3 MMOL/L (ref 0.5–2)
DEPRECATED RDW RBC AUTO: 52.5 FL (ref 37–54)
EOSINOPHIL # BLD AUTO: 0.1 10*3/MM3 (ref 0–0.4)
EOSINOPHIL NFR BLD AUTO: 0.7 % (ref 0.3–6.2)
ERYTHROCYTE [DISTWIDTH] IN BLOOD BY AUTOMATED COUNT: 16.3 % (ref 12.3–15.4)
GFR SERPL CREATININE-BSD FRML MDRD: 45 ML/MIN/1.73
GLOBULIN UR ELPH-MCNC: 3.8 GM/DL
GLUCOSE BLD-MCNC: 116 MG/DL (ref 65–99)
HCO3 BLDA-SCNC: 26.4 MMOL/L (ref 21–28)
HCT VFR BLD AUTO: 22.3 % (ref 34–46.6)
HEMODILUTION: NO
HGB BLD-MCNC: 7.5 G/DL (ref 12–15.9)
HOLD SPECIMEN: NORMAL
HOLD SPECIMEN: NORMAL
HOROWITZ INDEX BLD+IHG-RTO: 21 %
LYMPHOCYTES # BLD AUTO: 0.8 10*3/MM3 (ref 0.7–3.1)
LYMPHOCYTES NFR BLD AUTO: 6.5 % (ref 19.6–45.3)
MCH RBC QN AUTO: 30.8 PG (ref 26.6–33)
MCHC RBC AUTO-ENTMCNC: 33.8 G/DL (ref 31.5–35.7)
MCV RBC AUTO: 91.2 FL (ref 79–97)
MODALITY: ABNORMAL
MONOCYTES # BLD AUTO: 0.6 10*3/MM3 (ref 0.1–0.9)
MONOCYTES NFR BLD AUTO: 4.3 % (ref 5–12)
NEUTROPHILS # BLD AUTO: 11.5 10*3/MM3 (ref 1.7–7)
NEUTROPHILS NFR BLD AUTO: 88.4 % (ref 42.7–76)
NRBC BLD AUTO-RTO: 0 /100 WBC (ref 0–0.2)
PCO2 BLDA: 38 MM HG (ref 35–48)
PH BLDA: 7.45 PH UNITS (ref 7.35–7.45)
PLATELET # BLD AUTO: 376 10*3/MM3 (ref 140–450)
PMV BLD AUTO: 6.4 FL (ref 6–12)
PO2 BLDA: 67.1 MM HG (ref 83–108)
POTASSIUM BLD-SCNC: 5.1 MMOL/L (ref 3.5–5.2)
PROT SERPL-MCNC: 7.1 G/DL (ref 6–8.5)
RBC # BLD AUTO: 2.45 10*6/MM3 (ref 3.77–5.28)
RH BLD: POSITIVE
SAO2 % BLDCOA: 93.9 % (ref 94–98)
SODIUM BLD-SCNC: 126 MMOL/L (ref 136–145)
T&S EXPIRATION DATE: NORMAL
WBC NRBC COR # BLD: 13 10*3/MM3 (ref 3.4–10.8)
WHOLE BLOOD HOLD SPECIMEN: NORMAL
WHOLE BLOOD HOLD SPECIMEN: NORMAL

## 2019-11-25 PROCEDURE — 25010000002 METHYLPREDNISOLONE PER 125 MG: Performed by: EMERGENCY MEDICINE

## 2019-11-25 PROCEDURE — 25010000002 VANCOMYCIN 750 MG RECONSTITUTED SOLUTION 1 EACH VIAL: Performed by: EMERGENCY MEDICINE

## 2019-11-25 PROCEDURE — 94640 AIRWAY INHALATION TREATMENT: CPT

## 2019-11-25 PROCEDURE — 93005 ELECTROCARDIOGRAM TRACING: CPT | Performed by: EMERGENCY MEDICINE

## 2019-11-25 PROCEDURE — 25010000002 AZITHROMYCIN PER 500 MG: Performed by: FAMILY MEDICINE

## 2019-11-25 PROCEDURE — 36600 WITHDRAWAL OF ARTERIAL BLOOD: CPT

## 2019-11-25 PROCEDURE — 93005 ELECTROCARDIOGRAM TRACING: CPT

## 2019-11-25 PROCEDURE — 83605 ASSAY OF LACTIC ACID: CPT

## 2019-11-25 PROCEDURE — 94799 UNLISTED PULMONARY SVC/PX: CPT

## 2019-11-25 PROCEDURE — 99222 1ST HOSP IP/OBS MODERATE 55: CPT | Performed by: FAMILY MEDICINE

## 2019-11-25 PROCEDURE — 70450 CT HEAD/BRAIN W/O DYE: CPT

## 2019-11-25 PROCEDURE — 36430 TRANSFUSION BLD/BLD COMPNT: CPT

## 2019-11-25 PROCEDURE — P9016 RBC LEUKOCYTES REDUCED: HCPCS

## 2019-11-25 PROCEDURE — 86850 RBC ANTIBODY SCREEN: CPT | Performed by: EMERGENCY MEDICINE

## 2019-11-25 PROCEDURE — 87040 BLOOD CULTURE FOR BACTERIA: CPT | Performed by: EMERGENCY MEDICINE

## 2019-11-25 PROCEDURE — 71045 X-RAY EXAM CHEST 1 VIEW: CPT

## 2019-11-25 PROCEDURE — 80053 COMPREHEN METABOLIC PANEL: CPT | Performed by: EMERGENCY MEDICINE

## 2019-11-25 PROCEDURE — 25010000002 CEFEPIME PER 500 MG: Performed by: EMERGENCY MEDICINE

## 2019-11-25 PROCEDURE — 86901 BLOOD TYPING SEROLOGIC RH(D): CPT | Performed by: EMERGENCY MEDICINE

## 2019-11-25 PROCEDURE — 86923 COMPATIBILITY TEST ELECTRIC: CPT

## 2019-11-25 PROCEDURE — 85025 COMPLETE CBC W/AUTO DIFF WBC: CPT | Performed by: EMERGENCY MEDICINE

## 2019-11-25 PROCEDURE — 82803 BLOOD GASES ANY COMBINATION: CPT

## 2019-11-25 PROCEDURE — 86900 BLOOD TYPING SEROLOGIC ABO: CPT

## 2019-11-25 PROCEDURE — 86900 BLOOD TYPING SEROLOGIC ABO: CPT | Performed by: EMERGENCY MEDICINE

## 2019-11-25 PROCEDURE — 99285 EMERGENCY DEPT VISIT HI MDM: CPT

## 2019-11-25 RX ORDER — CALCIUM CARBONATE 200(500)MG
2 TABLET,CHEWABLE ORAL 2 TIMES DAILY PRN
Status: DISCONTINUED | OUTPATIENT
Start: 2019-11-25 | End: 2019-11-30 | Stop reason: HOSPADM

## 2019-11-25 RX ORDER — OXYCODONE HYDROCHLORIDE 5 MG/1
10 TABLET ORAL 4 TIMES DAILY PRN
Status: DISCONTINUED | OUTPATIENT
Start: 2019-11-25 | End: 2019-11-29

## 2019-11-25 RX ORDER — SODIUM CHLORIDE 0.9 % (FLUSH) 0.9 %
10 SYRINGE (ML) INJECTION EVERY 12 HOURS SCHEDULED
Status: DISCONTINUED | OUTPATIENT
Start: 2019-11-25 | End: 2019-11-30 | Stop reason: HOSPADM

## 2019-11-25 RX ORDER — LEVETIRACETAM 500 MG/1
500 TABLET ORAL 2 TIMES DAILY
COMMUNITY

## 2019-11-25 RX ORDER — ALBUTEROL SULFATE 2.5 MG/3ML
2.5 SOLUTION RESPIRATORY (INHALATION) EVERY 4 HOURS PRN
Status: DISCONTINUED | OUTPATIENT
Start: 2019-11-25 | End: 2019-11-30 | Stop reason: HOSPADM

## 2019-11-25 RX ORDER — FAMOTIDINE 20 MG/1
40 TABLET, FILM COATED ORAL DAILY
Status: DISCONTINUED | OUTPATIENT
Start: 2019-11-26 | End: 2019-11-25

## 2019-11-25 RX ORDER — CLOPIDOGREL BISULFATE 75 MG/1
75 TABLET ORAL DAILY
Status: DISCONTINUED | OUTPATIENT
Start: 2019-11-26 | End: 2019-11-26

## 2019-11-25 RX ORDER — ONDANSETRON 2 MG/ML
4 INJECTION INTRAMUSCULAR; INTRAVENOUS EVERY 6 HOURS PRN
Status: DISCONTINUED | OUTPATIENT
Start: 2019-11-25 | End: 2019-11-30 | Stop reason: HOSPADM

## 2019-11-25 RX ORDER — SODIUM CHLORIDE 0.9 % (FLUSH) 0.9 %
10 SYRINGE (ML) INJECTION AS NEEDED
Status: DISCONTINUED | OUTPATIENT
Start: 2019-11-25 | End: 2019-11-30 | Stop reason: HOSPADM

## 2019-11-25 RX ORDER — CARVEDILOL 12.5 MG/1
12.5 TABLET ORAL 2 TIMES DAILY WITH MEALS
COMMUNITY

## 2019-11-25 RX ORDER — HYDRALAZINE HYDROCHLORIDE 25 MG/1
25 TABLET, FILM COATED ORAL 2 TIMES DAILY PRN
Status: DISCONTINUED | OUTPATIENT
Start: 2019-11-25 | End: 2019-11-30 | Stop reason: HOSPADM

## 2019-11-25 RX ORDER — DEXTROSE AND SODIUM CHLORIDE 5; .9 G/100ML; G/100ML
75 INJECTION, SOLUTION INTRAVENOUS CONTINUOUS
Status: DISCONTINUED | OUTPATIENT
Start: 2019-11-25 | End: 2019-11-30 | Stop reason: HOSPADM

## 2019-11-25 RX ORDER — LEVETIRACETAM 100 MG/ML
500 SOLUTION ORAL EVERY 12 HOURS SCHEDULED
Status: DISCONTINUED | OUTPATIENT
Start: 2019-11-25 | End: 2019-11-26

## 2019-11-25 RX ORDER — DEMECLOCYCLINE HYDROCHLORIDE 150 MG/1
300 TABLET, FILM COATED ORAL 2 TIMES DAILY
Status: DISCONTINUED | OUTPATIENT
Start: 2019-11-25 | End: 2019-11-26

## 2019-11-25 RX ORDER — ACETAMINOPHEN 160 MG/5ML
650 SOLUTION ORAL EVERY 4 HOURS PRN
Status: DISCONTINUED | OUTPATIENT
Start: 2019-11-25 | End: 2019-11-30 | Stop reason: HOSPADM

## 2019-11-25 RX ORDER — ALBUTEROL SULFATE 2.5 MG/3ML
2.5 SOLUTION RESPIRATORY (INHALATION)
Status: COMPLETED | OUTPATIENT
Start: 2019-11-25 | End: 2019-11-25

## 2019-11-25 RX ORDER — CARVEDILOL 6.25 MG/1
12.5 TABLET ORAL 2 TIMES DAILY WITH MEALS
Status: DISCONTINUED | OUTPATIENT
Start: 2019-11-25 | End: 2019-11-26

## 2019-11-25 RX ORDER — ACETAMINOPHEN 650 MG/1
650 SUPPOSITORY RECTAL EVERY 4 HOURS PRN
Status: DISCONTINUED | OUTPATIENT
Start: 2019-11-25 | End: 2019-11-30 | Stop reason: HOSPADM

## 2019-11-25 RX ORDER — CHOLECALCIFEROL (VITAMIN D3) 125 MCG
5 CAPSULE ORAL NIGHTLY PRN
Status: DISCONTINUED | OUTPATIENT
Start: 2019-11-25 | End: 2019-11-30 | Stop reason: HOSPADM

## 2019-11-25 RX ORDER — ACETAMINOPHEN 325 MG/1
650 TABLET ORAL EVERY 4 HOURS PRN
Status: DISCONTINUED | OUTPATIENT
Start: 2019-11-25 | End: 2019-11-30 | Stop reason: HOSPADM

## 2019-11-25 RX ORDER — AMITRIPTYLINE HYDROCHLORIDE 50 MG/1
100 TABLET, FILM COATED ORAL NIGHTLY
COMMUNITY

## 2019-11-25 RX ORDER — SODIUM CHLORIDE 1000 MG
1 TABLET, SOLUBLE MISCELLANEOUS 2 TIMES DAILY
COMMUNITY
End: 2019-12-12

## 2019-11-25 RX ORDER — SODIUM CHLORIDE 1000 MG
1 TABLET, SOLUBLE MISCELLANEOUS 2 TIMES DAILY
Status: DISCONTINUED | OUTPATIENT
Start: 2019-11-25 | End: 2019-11-26

## 2019-11-25 RX ORDER — CLOPIDOGREL BISULFATE 75 MG/1
75 TABLET ORAL DAILY
COMMUNITY
End: 2019-12-11

## 2019-11-25 RX ORDER — CYANOCOBALAMIN 1000 UG/ML
1000 INJECTION, SOLUTION INTRAMUSCULAR; SUBCUTANEOUS
Status: DISCONTINUED | OUTPATIENT
Start: 2019-12-18 | End: 2019-11-30 | Stop reason: HOSPADM

## 2019-11-25 RX ORDER — NITROGLYCERIN 0.4 MG/1
0.4 TABLET SUBLINGUAL
Status: DISCONTINUED | OUTPATIENT
Start: 2019-11-25 | End: 2019-11-30 | Stop reason: HOSPADM

## 2019-11-25 RX ORDER — METHYLPREDNISOLONE SODIUM SUCCINATE 125 MG/2ML
125 INJECTION, POWDER, LYOPHILIZED, FOR SOLUTION INTRAMUSCULAR; INTRAVENOUS ONCE
Status: COMPLETED | OUTPATIENT
Start: 2019-11-25 | End: 2019-11-25

## 2019-11-25 RX ORDER — FAMOTIDINE 20 MG/1
40 TABLET, FILM COATED ORAL NIGHTLY
Status: DISCONTINUED | OUTPATIENT
Start: 2019-11-25 | End: 2019-11-26

## 2019-11-25 RX ORDER — LEVETIRACETAM 500 MG/1
500 TABLET ORAL EVERY 12 HOURS SCHEDULED
Status: DISCONTINUED | OUTPATIENT
Start: 2019-11-25 | End: 2019-11-25

## 2019-11-25 RX ADMIN — SODIUM CHLORIDE 1000 ML: 900 INJECTION, SOLUTION INTRAVENOUS at 15:19

## 2019-11-25 RX ADMIN — CEFEPIME HYDROCHLORIDE 2 G: 2 INJECTION, POWDER, FOR SOLUTION INTRAVENOUS at 15:33

## 2019-11-25 RX ADMIN — Medication 10 ML: at 22:07

## 2019-11-25 RX ADMIN — ALBUTEROL SULFATE 2.5 MG: 2.5 SOLUTION RESPIRATORY (INHALATION) at 15:10

## 2019-11-25 RX ADMIN — LEVETIRACETAM 500 MG: 500 SOLUTION ORAL at 22:43

## 2019-11-25 RX ADMIN — VANCOMYCIN HYDROCHLORIDE 750 MG: 750 INJECTION, POWDER, LYOPHILIZED, FOR SOLUTION INTRAVENOUS at 15:42

## 2019-11-25 RX ADMIN — METHYLPREDNISOLONE SODIUM SUCCINATE 125 MG: 125 INJECTION, POWDER, FOR SOLUTION INTRAMUSCULAR; INTRAVENOUS at 15:33

## 2019-11-25 RX ADMIN — ALBUTEROL SULFATE 2.5 MG: 2.5 SOLUTION RESPIRATORY (INHALATION) at 14:50

## 2019-11-25 RX ADMIN — FAMOTIDINE 40 MG: 20 TABLET, FILM COATED ORAL at 22:43

## 2019-11-25 RX ADMIN — DEMECLOCYCLINE HYDROCHLORIDE 300 MG: 150 TABLET, FILM COATED ORAL at 22:43

## 2019-11-25 RX ADMIN — AZITHROMYCIN MONOHYDRATE 500 MG: 500 INJECTION, POWDER, LYOPHILIZED, FOR SOLUTION INTRAVENOUS at 22:37

## 2019-11-25 RX ADMIN — DEXTROSE MONOHYDRATE AND SODIUM CHLORIDE 75 ML/HR: 5; .9 INJECTION, SOLUTION INTRAVENOUS at 22:06

## 2019-11-25 RX ADMIN — SODIUM CHLORIDE TAB 1 GM 1 G: 1 TAB at 22:44

## 2019-11-25 RX ADMIN — CARVEDILOL 12.5 MG: 6.25 TABLET, FILM COATED ORAL at 22:43

## 2019-11-26 ENCOUNTER — APPOINTMENT (OUTPATIENT)
Dept: CT IMAGING | Facility: HOSPITAL | Age: 59
End: 2019-11-26

## 2019-11-26 ENCOUNTER — ANESTHESIA EVENT (OUTPATIENT)
Dept: PERIOP | Facility: HOSPITAL | Age: 59
End: 2019-11-26

## 2019-11-26 ENCOUNTER — ANESTHESIA (OUTPATIENT)
Dept: PERIOP | Facility: HOSPITAL | Age: 59
End: 2019-11-26

## 2019-11-26 PROBLEM — R52 PAIN: Status: RESOLVED | Noted: 2017-09-19 | Resolved: 2019-11-26

## 2019-11-26 PROBLEM — J18.9 COMMUNITY ACQUIRED PNEUMONIA OF RIGHT LOWER LOBE OF LUNG: Status: RESOLVED | Noted: 2018-08-06 | Resolved: 2019-11-26

## 2019-11-26 PROBLEM — S01.01XA SCALP LACERATION, INITIAL ENCOUNTER: Status: RESOLVED | Noted: 2019-07-28 | Resolved: 2019-11-26

## 2019-11-26 PROBLEM — L89.314 DECUBITUS ULCER OF ISCHIAL AREA, RIGHT, STAGE IV (HCC): Status: ACTIVE | Noted: 2019-11-25

## 2019-11-26 LAB
ALBUMIN SERPL-MCNC: 3 G/DL (ref 3.5–5.2)
ALBUMIN/GLOB SERPL: 0.8 G/DL
ALP SERPL-CCNC: 71 U/L (ref 39–117)
ALT SERPL W P-5'-P-CCNC: 6 U/L (ref 1–33)
ANION GAP SERPL CALCULATED.3IONS-SCNC: 11 MMOL/L (ref 5–15)
AST SERPL-CCNC: 13 U/L (ref 1–32)
B PERT DNA SPEC QL NAA+PROBE: NOT DETECTED
BASOPHILS # BLD AUTO: 0 10*3/MM3 (ref 0–0.2)
BASOPHILS NFR BLD AUTO: 0 % (ref 0–1.5)
BILIRUB SERPL-MCNC: 0.2 MG/DL (ref 0.2–1.2)
BUN BLD-MCNC: 23 MG/DL (ref 8–23)
BUN/CREAT SERPL: 28 (ref 7–25)
C PNEUM DNA NPH QL NAA+NON-PROBE: NOT DETECTED
CALCIUM SPEC-SCNC: 8 MG/DL (ref 8.6–10.5)
CHLORIDE SERPL-SCNC: 96 MMOL/L (ref 98–107)
CO2 SERPL-SCNC: 23 MMOL/L (ref 22–29)
CREAT BLD-MCNC: 0.82 MG/DL (ref 0.57–1)
DEPRECATED RDW RBC AUTO: 50.3 FL (ref 37–54)
EOSINOPHIL # BLD AUTO: 0 10*3/MM3 (ref 0–0.4)
EOSINOPHIL NFR BLD AUTO: 0 % (ref 0.3–6.2)
ERYTHROCYTE [DISTWIDTH] IN BLOOD BY AUTOMATED COUNT: 16 % (ref 12.3–15.4)
FLUAV H1 2009 PAND RNA NPH QL NAA+PROBE: NOT DETECTED
FLUAV H1 HA GENE NPH QL NAA+PROBE: NOT DETECTED
FLUAV H3 RNA NPH QL NAA+PROBE: NOT DETECTED
FLUAV SUBTYP SPEC NAA+PROBE: NOT DETECTED
FLUBV RNA ISLT QL NAA+PROBE: NOT DETECTED
GFR SERPL CREATININE-BSD FRML MDRD: 71 ML/MIN/1.73
GLOBULIN UR ELPH-MCNC: 3.7 GM/DL
GLUCOSE BLD-MCNC: 179 MG/DL (ref 65–99)
HADV DNA SPEC NAA+PROBE: NOT DETECTED
HCOV 229E RNA SPEC QL NAA+PROBE: NOT DETECTED
HCOV HKU1 RNA SPEC QL NAA+PROBE: NOT DETECTED
HCOV NL63 RNA SPEC QL NAA+PROBE: NOT DETECTED
HCOV OC43 RNA SPEC QL NAA+PROBE: NOT DETECTED
HCT VFR BLD AUTO: 27.6 % (ref 34–46.6)
HGB BLD-MCNC: 9.4 G/DL (ref 12–15.9)
HMPV RNA NPH QL NAA+NON-PROBE: NOT DETECTED
HPIV1 RNA SPEC QL NAA+PROBE: NOT DETECTED
HPIV2 RNA SPEC QL NAA+PROBE: NOT DETECTED
HPIV3 RNA NPH QL NAA+PROBE: NOT DETECTED
HPIV4 P GENE NPH QL NAA+PROBE: NOT DETECTED
L PNEUMO1 AG UR QL IA: NEGATIVE
LYMPHOCYTES # BLD AUTO: 0.5 10*3/MM3 (ref 0.7–3.1)
LYMPHOCYTES NFR BLD AUTO: 4.5 % (ref 19.6–45.3)
M PNEUMO IGG SER IA-ACNC: NOT DETECTED
MCH RBC QN AUTO: 30.6 PG (ref 26.6–33)
MCHC RBC AUTO-ENTMCNC: 33.9 G/DL (ref 31.5–35.7)
MCV RBC AUTO: 90.2 FL (ref 79–97)
MONOCYTES # BLD AUTO: 0.1 10*3/MM3 (ref 0.1–0.9)
MONOCYTES NFR BLD AUTO: 1.2 % (ref 5–12)
NEUTROPHILS # BLD AUTO: 9.6 10*3/MM3 (ref 1.7–7)
NEUTROPHILS NFR BLD AUTO: 94.3 % (ref 42.7–76)
NRBC BLD AUTO-RTO: 0.1 /100 WBC (ref 0–0.2)
PLATELET # BLD AUTO: 338 10*3/MM3 (ref 140–450)
PMV BLD AUTO: 6.7 FL (ref 6–12)
POTASSIUM BLD-SCNC: 4.1 MMOL/L (ref 3.5–5.2)
PROT SERPL-MCNC: 6.7 G/DL (ref 6–8.5)
RBC # BLD AUTO: 3.06 10*6/MM3 (ref 3.77–5.28)
RHINOVIRUS RNA SPEC NAA+PROBE: NOT DETECTED
RSV RNA NPH QL NAA+NON-PROBE: NOT DETECTED
S PNEUM AG SPEC QL LA: NEGATIVE
SODIUM BLD-SCNC: 130 MMOL/L (ref 136–145)
WBC NRBC COR # BLD: 10.2 10*3/MM3 (ref 3.4–10.8)

## 2019-11-26 PROCEDURE — 97162 PT EVAL MOD COMPLEX 30 MIN: CPT

## 2019-11-26 PROCEDURE — 99232 SBSQ HOSP IP/OBS MODERATE 35: CPT | Performed by: FAMILY MEDICINE

## 2019-11-26 PROCEDURE — 87186 SC STD MICRODIL/AGAR DIL: CPT | Performed by: SURGERY

## 2019-11-26 PROCEDURE — 87899 AGENT NOS ASSAY W/OPTIC: CPT | Performed by: FAMILY MEDICINE

## 2019-11-26 PROCEDURE — 97530 THERAPEUTIC ACTIVITIES: CPT

## 2019-11-26 PROCEDURE — 25010000002 CEFTRIAXONE PER 250 MG: Performed by: FAMILY MEDICINE

## 2019-11-26 PROCEDURE — 85025 COMPLETE CBC W/AUTO DIFF WBC: CPT | Performed by: FAMILY MEDICINE

## 2019-11-26 PROCEDURE — 87070 CULTURE OTHR SPECIMN AEROBIC: CPT | Performed by: SURGERY

## 2019-11-26 PROCEDURE — 87075 CULTR BACTERIA EXCEPT BLOOD: CPT | Performed by: SURGERY

## 2019-11-26 PROCEDURE — 0099U HC BIOFIRE FILMARRAY RESP PANEL 1: CPT | Performed by: FAMILY MEDICINE

## 2019-11-26 PROCEDURE — 71250 CT THORAX DX C-: CPT

## 2019-11-26 PROCEDURE — 87077 CULTURE AEROBIC IDENTIFY: CPT | Performed by: SURGERY

## 2019-11-26 PROCEDURE — 25010000002 AZITHROMYCIN PER 500 MG: Performed by: INTERNAL MEDICINE

## 2019-11-26 PROCEDURE — 25010000002 PROPOFOL 10 MG/ML EMULSION: Performed by: STUDENT IN AN ORGANIZED HEALTH CARE EDUCATION/TRAINING PROGRAM

## 2019-11-26 PROCEDURE — 25010000002 FENTANYL CITRATE (PF) 100 MCG/2ML SOLUTION: Performed by: STUDENT IN AN ORGANIZED HEALTH CARE EDUCATION/TRAINING PROGRAM

## 2019-11-26 PROCEDURE — 11042 DBRDMT SUBQ TIS 1ST 20SQCM/<: CPT | Performed by: SURGERY

## 2019-11-26 PROCEDURE — 87205 SMEAR GRAM STAIN: CPT | Performed by: SURGERY

## 2019-11-26 PROCEDURE — 97116 GAIT TRAINING THERAPY: CPT

## 2019-11-26 PROCEDURE — 97535 SELF CARE MNGMENT TRAINING: CPT

## 2019-11-26 PROCEDURE — 97165 OT EVAL LOW COMPLEX 30 MIN: CPT

## 2019-11-26 PROCEDURE — 80053 COMPREHEN METABOLIC PANEL: CPT | Performed by: FAMILY MEDICINE

## 2019-11-26 PROCEDURE — 0JBL0ZZ EXCISION OF RIGHT UPPER LEG SUBCUTANEOUS TISSUE AND FASCIA, OPEN APPROACH: ICD-10-PCS | Performed by: SURGERY

## 2019-11-26 PROCEDURE — 99222 1ST HOSP IP/OBS MODERATE 55: CPT | Performed by: SURGERY

## 2019-11-26 PROCEDURE — 87076 CULTURE ANAEROBE IDENT EACH: CPT | Performed by: SURGERY

## 2019-11-26 RX ORDER — FAMOTIDINE 20 MG/1
40 TABLET, FILM COATED ORAL NIGHTLY
Status: DISCONTINUED | OUTPATIENT
Start: 2019-11-26 | End: 2019-11-30 | Stop reason: HOSPADM

## 2019-11-26 RX ORDER — FENTANYL CITRATE 50 UG/ML
INJECTION, SOLUTION INTRAMUSCULAR; INTRAVENOUS AS NEEDED
Status: DISCONTINUED | OUTPATIENT
Start: 2019-11-26 | End: 2019-11-26 | Stop reason: SURG

## 2019-11-26 RX ORDER — FENTANYL CITRATE 50 UG/ML
25 INJECTION, SOLUTION INTRAMUSCULAR; INTRAVENOUS
Status: DISCONTINUED | OUTPATIENT
Start: 2019-11-26 | End: 2019-11-26 | Stop reason: HOSPADM

## 2019-11-26 RX ORDER — SODIUM CHLORIDE, SODIUM LACTATE, POTASSIUM CHLORIDE, CALCIUM CHLORIDE 600; 310; 30; 20 MG/100ML; MG/100ML; MG/100ML; MG/100ML
INJECTION, SOLUTION INTRAVENOUS CONTINUOUS PRN
Status: DISCONTINUED | OUTPATIENT
Start: 2019-11-26 | End: 2019-11-26 | Stop reason: SURG

## 2019-11-26 RX ORDER — PROPOFOL 10 MG/ML
VIAL (ML) INTRAVENOUS AS NEEDED
Status: DISCONTINUED | OUTPATIENT
Start: 2019-11-26 | End: 2019-11-26 | Stop reason: SURG

## 2019-11-26 RX ORDER — SODIUM CHLORIDE 1000 MG
1 TABLET, SOLUBLE MISCELLANEOUS 2 TIMES DAILY
Status: DISCONTINUED | OUTPATIENT
Start: 2019-11-26 | End: 2019-11-30 | Stop reason: HOSPADM

## 2019-11-26 RX ORDER — CARVEDILOL 6.25 MG/1
12.5 TABLET ORAL 2 TIMES DAILY WITH MEALS
Status: DISCONTINUED | OUTPATIENT
Start: 2019-11-26 | End: 2019-11-30 | Stop reason: HOSPADM

## 2019-11-26 RX ORDER — DEMECLOCYCLINE HYDROCHLORIDE 150 MG/1
300 TABLET, FILM COATED ORAL 2 TIMES DAILY
Status: DISCONTINUED | OUTPATIENT
Start: 2019-11-26 | End: 2019-11-30 | Stop reason: HOSPADM

## 2019-11-26 RX ORDER — LEVETIRACETAM 100 MG/ML
500 SOLUTION ORAL EVERY 12 HOURS SCHEDULED
Status: DISCONTINUED | OUTPATIENT
Start: 2019-11-26 | End: 2019-11-30 | Stop reason: HOSPADM

## 2019-11-26 RX ORDER — CLOPIDOGREL BISULFATE 75 MG/1
75 TABLET ORAL DAILY
Status: DISCONTINUED | OUTPATIENT
Start: 2019-11-27 | End: 2019-11-30 | Stop reason: HOSPADM

## 2019-11-26 RX ADMIN — SODIUM CHLORIDE TAB 1 GM 1 G: 1 TAB at 08:14

## 2019-11-26 RX ADMIN — PROPOFOL 20 MG: 10 INJECTION, EMULSION INTRAVENOUS at 17:03

## 2019-11-26 RX ADMIN — CARVEDILOL 12.5 MG: 6.25 TABLET, FILM COATED ORAL at 08:13

## 2019-11-26 RX ADMIN — ACETAMINOPHEN 650 MG: 325 TABLET ORAL at 20:54

## 2019-11-26 RX ADMIN — OXYCODONE HYDROCHLORIDE 10 MG: 5 TABLET ORAL at 22:56

## 2019-11-26 RX ADMIN — OXYCODONE HYDROCHLORIDE 10 MG: 5 TABLET ORAL at 12:15

## 2019-11-26 RX ADMIN — DEMECLOCYCLINE HYDROCHLORIDE 300 MG: 150 TABLET, FILM COATED ORAL at 08:14

## 2019-11-26 RX ADMIN — PROPOFOL 20 MG: 10 INJECTION, EMULSION INTRAVENOUS at 17:06

## 2019-11-26 RX ADMIN — LEVETIRACETAM 500 MG: 500 SOLUTION ORAL at 20:54

## 2019-11-26 RX ADMIN — DAKIN'S SOLUTION 0.125% (QUARTER STRENGTH): 0.12 SOLUTION at 14:11

## 2019-11-26 RX ADMIN — OXYCODONE HYDROCHLORIDE 10 MG: 5 TABLET ORAL at 18:26

## 2019-11-26 RX ADMIN — PROPOFOL 20 MG: 10 INJECTION, EMULSION INTRAVENOUS at 17:09

## 2019-11-26 RX ADMIN — AZITHROMYCIN MONOHYDRATE 250 MG: 500 INJECTION, POWDER, LYOPHILIZED, FOR SOLUTION INTRAVENOUS at 20:55

## 2019-11-26 RX ADMIN — OXYCODONE HYDROCHLORIDE 10 MG: 5 TABLET ORAL at 05:21

## 2019-11-26 RX ADMIN — DEMECLOCYCLINE HYDROCHLORIDE 300 MG: 150 TABLET, FILM COATED ORAL at 20:54

## 2019-11-26 RX ADMIN — FENTANYL CITRATE 25 MCG: 50 INJECTION, SOLUTION INTRAMUSCULAR; INTRAVENOUS at 16:56

## 2019-11-26 RX ADMIN — FENTANYL CITRATE 25 MCG: 50 INJECTION, SOLUTION INTRAMUSCULAR; INTRAVENOUS at 17:05

## 2019-11-26 RX ADMIN — SODIUM CHLORIDE TAB 1 GM 1 G: 1 TAB at 20:54

## 2019-11-26 RX ADMIN — DEXTROSE MONOHYDRATE AND SODIUM CHLORIDE 75 ML/HR: 5; .9 INJECTION, SOLUTION INTRAVENOUS at 18:22

## 2019-11-26 RX ADMIN — CLOPIDOGREL BISULFATE 75 MG: 75 TABLET ORAL at 08:14

## 2019-11-26 RX ADMIN — Medication 10 ML: at 20:54

## 2019-11-26 RX ADMIN — CARVEDILOL 12.5 MG: 6.25 TABLET, FILM COATED ORAL at 18:22

## 2019-11-26 RX ADMIN — CEFTRIAXONE SODIUM 1 G: 1 INJECTION, POWDER, FOR SOLUTION INTRAMUSCULAR; INTRAVENOUS at 09:44

## 2019-11-26 RX ADMIN — SODIUM CHLORIDE, SODIUM LACTATE, POTASSIUM CHLORIDE, AND CALCIUM CHLORIDE: .6; .31; .03; .02 INJECTION, SOLUTION INTRAVENOUS at 16:54

## 2019-11-26 RX ADMIN — FAMOTIDINE 40 MG: 20 TABLET, FILM COATED ORAL at 20:54

## 2019-11-26 RX ADMIN — PROPOFOL 30 MG: 10 INJECTION, EMULSION INTRAVENOUS at 17:00

## 2019-11-26 RX ADMIN — LEVETIRACETAM 500 MG: 500 SOLUTION ORAL at 08:15

## 2019-11-26 RX ADMIN — Medication 10 ML: at 08:14

## 2019-11-26 NOTE — ANESTHESIA PREPROCEDURE EVALUATION
Anesthesia Evaluation     Patient summary reviewed and Nursing notes reviewed   no history of anesthetic complications:  NPO Solid Status: > 8 hours  NPO Liquid Status: > 2 hours           Airway   Mallampati: III  TM distance: <3 FB  Neck ROM: limited  Small opening and Difficult intubation highly probable  Dental    (+) poor dentition    Pulmonary    (+) pneumonia stable , rhonchi,   Cardiovascular - normal exam    ECG reviewed    (+) hypertension, cardiac stents PVD,  carotid artery disease right carotid      Neuro/Psych  (+) seizures, psychiatric history,     GI/Hepatic/Renal/Endo    (+)  GERD, PUD,  renal disease CRI,     Musculoskeletal     (+) myalgias,   Abdominal   (+) scaphoid   Substance History      OB/GYN    (-)  Pregnant        Other      history of cancer remission    ROS/Med Hx Other: Head and neck cancer s/p radiation and excision.  PEG tube.                  Anesthesia Plan    ASA 4     MAC     intravenous induction     Anesthetic plan, all risks, benefits, and alternatives have been provided, discussed and informed consent has been obtained with: patient.

## 2019-11-26 NOTE — ANESTHESIA POSTPROCEDURE EVALUATION
Patient: Sri Bobo    Procedure Summary     Date:  11/26/19 Room / Location:  [facility] OR 08 / [facility] MAIN OR    Anesthesia Start:  1652 Anesthesia Stop:  1733    Procedure:  DEBRIDEMENT OF ISCHEAL ULCER/BUTTOCKS WOUND (Right Buttocks) Diagnosis:       Decubitus ulcer of ischial area, right, stage IV (CMS/HCC)      (Decubitus ulcer of ischial area, right, stage IV (CMS/HCC) [L89.314])    Surgeon:  Melanie Zuñiga MD Provider:  Edgar Avila MD    Anesthesia Type:  MAC ASA Status:  4          Anesthesia Type: MAC  Last vitals  BP   164/87 (11/26/19 1446)   Temp   97.5 °F (36.4 °C) (11/26/19 1446)   Pulse   73 (11/26/19 1446)   Resp   14 (11/26/19 1446)     SpO2   100 % (11/26/19 1446)     Post Anesthesia Care and Evaluation    Patient location during evaluation: PACU  Patient participation: complete - patient participated  Level of consciousness: awake  Pain score: 0  Pain management: adequate  Airway patency: patent  Anesthetic complications: No anesthetic complications  PONV Status: none  Cardiovascular status: acceptable  Respiratory status: acceptable  Hydration status: acceptable    Comments: Patient seen and examined postoperatively; vital signs stable; SpO2 greater than or equal to 90%; cardiopulmonary status stable; nausea/vomiting adequately controlled; pain adequately controlled; no apparent anesthesia complications; patient discharged from anesthesia care when discharge criteria were met

## 2019-11-27 ENCOUNTER — APPOINTMENT (OUTPATIENT)
Dept: GENERAL RADIOLOGY | Facility: HOSPITAL | Age: 59
End: 2019-11-27

## 2019-11-27 LAB
ABO + RH BLD: NORMAL
ANION GAP SERPL CALCULATED.3IONS-SCNC: 11 MMOL/L (ref 5–15)
BASOPHILS # BLD AUTO: 0 10*3/MM3 (ref 0–0.2)
BASOPHILS NFR BLD AUTO: 0.1 % (ref 0–1.5)
BH BB BLOOD EXPIRATION DATE: NORMAL
BH BB BLOOD TYPE BARCODE: 5100
BH BB DISPENSE STATUS: NORMAL
BH BB PRODUCT CODE: NORMAL
BH BB UNIT NUMBER: NORMAL
BUN BLD-MCNC: 24 MG/DL (ref 8–23)
BUN/CREAT SERPL: 28.6 (ref 7–25)
CALCIUM SPEC-SCNC: 8.1 MG/DL (ref 8.6–10.5)
CHLORIDE SERPL-SCNC: 103 MMOL/L (ref 98–107)
CO2 SERPL-SCNC: 21 MMOL/L (ref 22–29)
CREAT BLD-MCNC: 0.84 MG/DL (ref 0.57–1)
DEPRECATED RDW RBC AUTO: 51.2 FL (ref 37–54)
EOSINOPHIL # BLD AUTO: 0 10*3/MM3 (ref 0–0.4)
EOSINOPHIL NFR BLD AUTO: 0.3 % (ref 0.3–6.2)
ERYTHROCYTE [DISTWIDTH] IN BLOOD BY AUTOMATED COUNT: 15.9 % (ref 12.3–15.4)
GFR SERPL CREATININE-BSD FRML MDRD: 69 ML/MIN/1.73
GLUCOSE BLD-MCNC: 95 MG/DL (ref 65–99)
HCT VFR BLD AUTO: 28.7 % (ref 34–46.6)
HGB BLD-MCNC: 9.5 G/DL (ref 12–15.9)
LYMPHOCYTES # BLD AUTO: 0.8 10*3/MM3 (ref 0.7–3.1)
LYMPHOCYTES NFR BLD AUTO: 9 % (ref 19.6–45.3)
MCH RBC QN AUTO: 30.3 PG (ref 26.6–33)
MCHC RBC AUTO-ENTMCNC: 33.2 G/DL (ref 31.5–35.7)
MCV RBC AUTO: 91.4 FL (ref 79–97)
MONOCYTES # BLD AUTO: 0.5 10*3/MM3 (ref 0.1–0.9)
MONOCYTES NFR BLD AUTO: 5.7 % (ref 5–12)
NEUTROPHILS # BLD AUTO: 7.8 10*3/MM3 (ref 1.7–7)
NEUTROPHILS NFR BLD AUTO: 84.9 % (ref 42.7–76)
NRBC BLD AUTO-RTO: 0.1 /100 WBC (ref 0–0.2)
PLATELET # BLD AUTO: 398 10*3/MM3 (ref 140–450)
PMV BLD AUTO: 7 FL (ref 6–12)
POTASSIUM BLD-SCNC: 3.7 MMOL/L (ref 3.5–5.2)
RBC # BLD AUTO: 3.14 10*6/MM3 (ref 3.77–5.28)
SODIUM BLD-SCNC: 135 MMOL/L (ref 136–145)
UNIT  ABO: NORMAL
UNIT  RH: NORMAL
WBC NRBC COR # BLD: 9.3 10*3/MM3 (ref 3.4–10.8)

## 2019-11-27 PROCEDURE — 99232 SBSQ HOSP IP/OBS MODERATE 35: CPT | Performed by: FAMILY MEDICINE

## 2019-11-27 PROCEDURE — 73030 X-RAY EXAM OF SHOULDER: CPT

## 2019-11-27 PROCEDURE — 97112 NEUROMUSCULAR REEDUCATION: CPT

## 2019-11-27 PROCEDURE — 87205 SMEAR GRAM STAIN: CPT | Performed by: INTERNAL MEDICINE

## 2019-11-27 PROCEDURE — 80048 BASIC METABOLIC PNL TOTAL CA: CPT | Performed by: FAMILY MEDICINE

## 2019-11-27 PROCEDURE — 87070 CULTURE OTHR SPECIMN AEROBIC: CPT | Performed by: FAMILY MEDICINE

## 2019-11-27 PROCEDURE — 85025 COMPLETE CBC W/AUTO DIFF WBC: CPT | Performed by: FAMILY MEDICINE

## 2019-11-27 PROCEDURE — 97530 THERAPEUTIC ACTIVITIES: CPT

## 2019-11-27 PROCEDURE — 87070 CULTURE OTHR SPECIMN AEROBIC: CPT | Performed by: INTERNAL MEDICINE

## 2019-11-27 PROCEDURE — 97116 GAIT TRAINING THERAPY: CPT

## 2019-11-27 PROCEDURE — 87205 SMEAR GRAM STAIN: CPT | Performed by: FAMILY MEDICINE

## 2019-11-27 PROCEDURE — 25010000002 AZITHROMYCIN PER 500 MG: Performed by: INTERNAL MEDICINE

## 2019-11-27 PROCEDURE — 25010000002 CEFTRIAXONE PER 250 MG: Performed by: FAMILY MEDICINE

## 2019-11-27 RX ADMIN — LEVETIRACETAM 500 MG: 500 SOLUTION ORAL at 20:01

## 2019-11-27 RX ADMIN — Medication 10 ML: at 20:02

## 2019-11-27 RX ADMIN — CLOPIDOGREL BISULFATE 75 MG: 75 TABLET ORAL at 08:47

## 2019-11-27 RX ADMIN — OXYCODONE HYDROCHLORIDE 10 MG: 5 TABLET ORAL at 14:30

## 2019-11-27 RX ADMIN — SODIUM CHLORIDE TAB 1 GM 1 G: 1 TAB at 08:47

## 2019-11-27 RX ADMIN — AZITHROMYCIN MONOHYDRATE 250 MG: 500 INJECTION, POWDER, LYOPHILIZED, FOR SOLUTION INTRAVENOUS at 21:59

## 2019-11-27 RX ADMIN — DAKIN'S SOLUTION 0.125% (QUARTER STRENGTH) 1 ML: 0.12 SOLUTION at 20:03

## 2019-11-27 RX ADMIN — DEMECLOCYCLINE HYDROCHLORIDE 300 MG: 150 TABLET, FILM COATED ORAL at 20:00

## 2019-11-27 RX ADMIN — MELATONIN TAB 5 MG 5 MG: 5 TAB at 22:00

## 2019-11-27 RX ADMIN — OXYCODONE HYDROCHLORIDE 10 MG: 5 TABLET ORAL at 20:05

## 2019-11-27 RX ADMIN — Medication 10 ML: at 08:47

## 2019-11-27 RX ADMIN — OXYCODONE HYDROCHLORIDE 10 MG: 5 TABLET ORAL at 09:02

## 2019-11-27 RX ADMIN — SODIUM CHLORIDE TAB 1 GM 1 G: 1 TAB at 20:04

## 2019-11-27 RX ADMIN — DEMECLOCYCLINE HYDROCHLORIDE 300 MG: 150 TABLET, FILM COATED ORAL at 08:47

## 2019-11-27 RX ADMIN — DAKIN'S SOLUTION 0.125% (QUARTER STRENGTH): 0.12 SOLUTION at 08:50

## 2019-11-27 RX ADMIN — DEXTROSE MONOHYDRATE AND SODIUM CHLORIDE 75 ML/HR: 5; .9 INJECTION, SOLUTION INTRAVENOUS at 08:47

## 2019-11-27 RX ADMIN — HYDRALAZINE HYDROCHLORIDE 25 MG: 25 TABLET, FILM COATED ORAL at 14:57

## 2019-11-27 RX ADMIN — CARVEDILOL 12.5 MG: 6.25 TABLET, FILM COATED ORAL at 08:47

## 2019-11-27 RX ADMIN — SODIUM CHLORIDE TAB 1 GM 1 G: 1 TAB at 20:03

## 2019-11-27 RX ADMIN — FAMOTIDINE 40 MG: 20 TABLET, FILM COATED ORAL at 20:01

## 2019-11-27 RX ADMIN — LEVETIRACETAM 500 MG: 500 SOLUTION ORAL at 08:47

## 2019-11-27 RX ADMIN — CEFTRIAXONE SODIUM 1 G: 1 INJECTION, POWDER, FOR SOLUTION INTRAMUSCULAR; INTRAVENOUS at 09:04

## 2019-11-27 RX ADMIN — CARVEDILOL 12.5 MG: 6.25 TABLET, FILM COATED ORAL at 17:11

## 2019-11-28 PROBLEM — S42.032A CLOSED DISPLACED FRACTURE OF ACROMIAL END OF LEFT CLAVICLE: Status: ACTIVE | Noted: 2019-11-28

## 2019-11-28 LAB
ANION GAP SERPL CALCULATED.3IONS-SCNC: 7 MMOL/L (ref 5–15)
BASOPHILS # BLD AUTO: 0 10*3/MM3 (ref 0–0.2)
BASOPHILS NFR BLD AUTO: 0.4 % (ref 0–1.5)
BUN BLD-MCNC: 18 MG/DL (ref 8–23)
BUN/CREAT SERPL: 30 (ref 7–25)
CALCIUM SPEC-SCNC: 7.7 MG/DL (ref 8.6–10.5)
CHLORIDE SERPL-SCNC: 102 MMOL/L (ref 98–107)
CO2 SERPL-SCNC: 24 MMOL/L (ref 22–29)
CREAT BLD-MCNC: 0.6 MG/DL (ref 0.57–1)
DEPRECATED RDW RBC AUTO: 51.6 FL (ref 37–54)
EOSINOPHIL # BLD AUTO: 0.2 10*3/MM3 (ref 0–0.4)
EOSINOPHIL NFR BLD AUTO: 2 % (ref 0.3–6.2)
ERYTHROCYTE [DISTWIDTH] IN BLOOD BY AUTOMATED COUNT: 15.9 % (ref 12.3–15.4)
GFR SERPL CREATININE-BSD FRML MDRD: 102 ML/MIN/1.73
GLUCOSE BLD-MCNC: 104 MG/DL (ref 65–99)
HCT VFR BLD AUTO: 27.4 % (ref 34–46.6)
HGB BLD-MCNC: 9 G/DL (ref 12–15.9)
LYMPHOCYTES # BLD AUTO: 1 10*3/MM3 (ref 0.7–3.1)
LYMPHOCYTES NFR BLD AUTO: 10.7 % (ref 19.6–45.3)
MCH RBC QN AUTO: 29.8 PG (ref 26.6–33)
MCHC RBC AUTO-ENTMCNC: 32.9 G/DL (ref 31.5–35.7)
MCV RBC AUTO: 90.6 FL (ref 79–97)
MONOCYTES # BLD AUTO: 0.6 10*3/MM3 (ref 0.1–0.9)
MONOCYTES NFR BLD AUTO: 6.4 % (ref 5–12)
NEUTROPHILS # BLD AUTO: 7.2 10*3/MM3 (ref 1.7–7)
NEUTROPHILS NFR BLD AUTO: 80.5 % (ref 42.7–76)
NRBC BLD AUTO-RTO: 0 /100 WBC (ref 0–0.2)
PLATELET # BLD AUTO: 384 10*3/MM3 (ref 140–450)
PMV BLD AUTO: 6.5 FL (ref 6–12)
POTASSIUM BLD-SCNC: 3.9 MMOL/L (ref 3.5–5.2)
RBC # BLD AUTO: 3.02 10*6/MM3 (ref 3.77–5.28)
SODIUM BLD-SCNC: 133 MMOL/L (ref 136–145)
WBC NRBC COR # BLD: 9 10*3/MM3 (ref 3.4–10.8)

## 2019-11-28 PROCEDURE — 99232 SBSQ HOSP IP/OBS MODERATE 35: CPT | Performed by: FAMILY MEDICINE

## 2019-11-28 PROCEDURE — 25010000002 VANCOMYCIN 750 MG RECONSTITUTED SOLUTION 1 EACH VIAL: Performed by: FAMILY MEDICINE

## 2019-11-28 PROCEDURE — 25010000002 MEROPENEM PER 100 MG: Performed by: FAMILY MEDICINE

## 2019-11-28 PROCEDURE — 80048 BASIC METABOLIC PNL TOTAL CA: CPT | Performed by: FAMILY MEDICINE

## 2019-11-28 PROCEDURE — 85025 COMPLETE CBC W/AUTO DIFF WBC: CPT | Performed by: FAMILY MEDICINE

## 2019-11-28 PROCEDURE — 25010000002 CEFTRIAXONE PER 250 MG: Performed by: FAMILY MEDICINE

## 2019-11-28 RX ADMIN — VANCOMYCIN HYDROCHLORIDE 750 MG: 750 INJECTION, POWDER, LYOPHILIZED, FOR SOLUTION INTRAVENOUS at 12:35

## 2019-11-28 RX ADMIN — DAKIN'S SOLUTION 0.125% (QUARTER STRENGTH): 0.12 SOLUTION at 09:24

## 2019-11-28 RX ADMIN — OXYCODONE HYDROCHLORIDE 10 MG: 5 TABLET ORAL at 03:18

## 2019-11-28 RX ADMIN — OXYCODONE HYDROCHLORIDE 10 MG: 5 TABLET ORAL at 16:58

## 2019-11-28 RX ADMIN — OXYCODONE HYDROCHLORIDE 10 MG: 5 TABLET ORAL at 23:09

## 2019-11-28 RX ADMIN — HYDRALAZINE HYDROCHLORIDE 25 MG: 25 TABLET, FILM COATED ORAL at 11:50

## 2019-11-28 RX ADMIN — MEROPENEM 1 G: 1 INJECTION, POWDER, FOR SOLUTION INTRAVENOUS at 20:38

## 2019-11-28 RX ADMIN — Medication 10 ML: at 20:39

## 2019-11-28 RX ADMIN — FAMOTIDINE 40 MG: 20 TABLET, FILM COATED ORAL at 20:39

## 2019-11-28 RX ADMIN — SODIUM CHLORIDE TAB 1 GM 1 G: 1 TAB at 09:24

## 2019-11-28 RX ADMIN — DEMECLOCYCLINE HYDROCHLORIDE 300 MG: 150 TABLET, FILM COATED ORAL at 09:24

## 2019-11-28 RX ADMIN — DAKIN'S SOLUTION 0.125% (QUARTER STRENGTH) 1 ML: 0.12 SOLUTION at 20:40

## 2019-11-28 RX ADMIN — CARVEDILOL 12.5 MG: 6.25 TABLET, FILM COATED ORAL at 09:24

## 2019-11-28 RX ADMIN — DEMECLOCYCLINE HYDROCHLORIDE 300 MG: 150 TABLET, FILM COATED ORAL at 20:39

## 2019-11-28 RX ADMIN — Medication 10 ML: at 09:24

## 2019-11-28 RX ADMIN — DEXTROSE MONOHYDRATE AND SODIUM CHLORIDE 75 ML/HR: 5; .9 INJECTION, SOLUTION INTRAVENOUS at 12:35

## 2019-11-28 RX ADMIN — HYDRALAZINE HYDROCHLORIDE 25 MG: 25 TABLET, FILM COATED ORAL at 03:14

## 2019-11-28 RX ADMIN — LEVETIRACETAM 500 MG: 500 SOLUTION ORAL at 20:39

## 2019-11-28 RX ADMIN — DEXTROSE MONOHYDRATE AND SODIUM CHLORIDE 75 ML/HR: 5; .9 INJECTION, SOLUTION INTRAVENOUS at 00:21

## 2019-11-28 RX ADMIN — CARVEDILOL 12.5 MG: 6.25 TABLET, FILM COATED ORAL at 17:01

## 2019-11-28 RX ADMIN — SODIUM CHLORIDE TAB 1 GM 1 G: 1 TAB at 20:39

## 2019-11-28 RX ADMIN — LEVETIRACETAM 500 MG: 500 SOLUTION ORAL at 09:24

## 2019-11-28 RX ADMIN — MEROPENEM 1 G: 1 INJECTION, POWDER, FOR SOLUTION INTRAVENOUS at 12:35

## 2019-11-28 RX ADMIN — CLOPIDOGREL BISULFATE 75 MG: 75 TABLET ORAL at 09:24

## 2019-11-28 RX ADMIN — CEFTRIAXONE SODIUM 1 G: 1 INJECTION, POWDER, FOR SOLUTION INTRAMUSCULAR; INTRAVENOUS at 09:24

## 2019-11-28 RX ADMIN — ACETAMINOPHEN 650 MG: 325 TABLET ORAL at 11:55

## 2019-11-28 RX ADMIN — OXYCODONE HYDROCHLORIDE 10 MG: 5 TABLET ORAL at 09:26

## 2019-11-29 LAB
BACTERIA SPEC AEROBE CULT: ABNORMAL
BACTERIA SPEC AEROBE CULT: ABNORMAL
BACTERIA SPEC RESP CULT: NORMAL
BACTERIA SPEC RESP CULT: NORMAL
GRAM STN SPEC: ABNORMAL
GRAM STN SPEC: NORMAL
VANCOMYCIN PEAK SERPL-MCNC: 19.3 MCG/ML (ref 20–40)

## 2019-11-29 PROCEDURE — 97530 THERAPEUTIC ACTIVITIES: CPT

## 2019-11-29 PROCEDURE — 25010000002 MEROPENEM PER 100 MG: Performed by: FAMILY MEDICINE

## 2019-11-29 PROCEDURE — 97116 GAIT TRAINING THERAPY: CPT

## 2019-11-29 PROCEDURE — 80202 ASSAY OF VANCOMYCIN: CPT | Performed by: FAMILY MEDICINE

## 2019-11-29 PROCEDURE — 25010000002 VANCOMYCIN PER 500 MG: Performed by: FAMILY MEDICINE

## 2019-11-29 PROCEDURE — 99221 1ST HOSP IP/OBS SF/LOW 40: CPT | Performed by: ORTHOPAEDIC SURGERY

## 2019-11-29 PROCEDURE — 99232 SBSQ HOSP IP/OBS MODERATE 35: CPT | Performed by: FAMILY MEDICINE

## 2019-11-29 PROCEDURE — 97535 SELF CARE MNGMENT TRAINING: CPT

## 2019-11-29 RX ORDER — OXYCODONE HYDROCHLORIDE 5 MG/1
10 TABLET ORAL EVERY 4 HOURS PRN
Status: DISCONTINUED | OUTPATIENT
Start: 2019-11-29 | End: 2019-11-30 | Stop reason: HOSPADM

## 2019-11-29 RX ORDER — ARGININE/GLUTAMINE/CALCIUM BMB 7G-7G-1.5G
1 POWDER IN PACKET (EA) ORAL 2 TIMES DAILY
Status: DISCONTINUED | OUTPATIENT
Start: 2019-11-29 | End: 2019-11-30 | Stop reason: HOSPADM

## 2019-11-29 RX ORDER — LIDOCAINE 50 MG/G
1 PATCH TOPICAL
Status: DISCONTINUED | OUTPATIENT
Start: 2019-11-29 | End: 2019-11-30 | Stop reason: HOSPADM

## 2019-11-29 RX ADMIN — OXYCODONE HYDROCHLORIDE 10 MG: 5 TABLET ORAL at 23:59

## 2019-11-29 RX ADMIN — FAMOTIDINE 40 MG: 20 TABLET, FILM COATED ORAL at 21:58

## 2019-11-29 RX ADMIN — CLOPIDOGREL BISULFATE 75 MG: 75 TABLET ORAL at 08:28

## 2019-11-29 RX ADMIN — DEMECLOCYCLINE HYDROCHLORIDE 300 MG: 150 TABLET, FILM COATED ORAL at 08:28

## 2019-11-29 RX ADMIN — VANCOMYCIN HYDROCHLORIDE 500 MG: 500 INJECTION, POWDER, LYOPHILIZED, FOR SOLUTION INTRAVENOUS at 01:53

## 2019-11-29 RX ADMIN — CARVEDILOL 12.5 MG: 6.25 TABLET, FILM COATED ORAL at 17:41

## 2019-11-29 RX ADMIN — MEROPENEM 1 G: 1 INJECTION, POWDER, FOR SOLUTION INTRAVENOUS at 05:07

## 2019-11-29 RX ADMIN — MEROPENEM 1 G: 1 INJECTION, POWDER, FOR SOLUTION INTRAVENOUS at 12:48

## 2019-11-29 RX ADMIN — Medication 10 ML: at 08:29

## 2019-11-29 RX ADMIN — DEXTROSE MONOHYDRATE AND SODIUM CHLORIDE 75 ML/HR: 5; .9 INJECTION, SOLUTION INTRAVENOUS at 08:29

## 2019-11-29 RX ADMIN — LEVETIRACETAM 500 MG: 500 SOLUTION ORAL at 08:29

## 2019-11-29 RX ADMIN — MEROPENEM 1 G: 1 INJECTION, POWDER, FOR SOLUTION INTRAVENOUS at 21:49

## 2019-11-29 RX ADMIN — OXYCODONE HYDROCHLORIDE 10 MG: 5 TABLET ORAL at 10:37

## 2019-11-29 RX ADMIN — SODIUM CHLORIDE TAB 1 GM 1 G: 1 TAB at 21:58

## 2019-11-29 RX ADMIN — Medication 1 PACKET: at 17:49

## 2019-11-29 RX ADMIN — LEVETIRACETAM 500 MG: 500 SOLUTION ORAL at 21:58

## 2019-11-29 RX ADMIN — LIDOCAINE 1 PATCH: 50 PATCH CUTANEOUS at 15:51

## 2019-11-29 RX ADMIN — DAKIN'S SOLUTION 0.125% (QUARTER STRENGTH): 0.12 SOLUTION at 08:29

## 2019-11-29 RX ADMIN — OXYCODONE HYDROCHLORIDE 10 MG: 5 TABLET ORAL at 17:41

## 2019-11-29 RX ADMIN — VANCOMYCIN HYDROCHLORIDE 500 MG: 500 INJECTION, POWDER, LYOPHILIZED, FOR SOLUTION INTRAVENOUS at 12:47

## 2019-11-29 RX ADMIN — HYDRALAZINE HYDROCHLORIDE 25 MG: 25 TABLET, FILM COATED ORAL at 06:12

## 2019-11-29 RX ADMIN — DEMECLOCYCLINE HYDROCHLORIDE 300 MG: 150 TABLET, FILM COATED ORAL at 21:58

## 2019-11-29 RX ADMIN — CARVEDILOL 12.5 MG: 6.25 TABLET, FILM COATED ORAL at 08:28

## 2019-11-29 RX ADMIN — SODIUM CHLORIDE TAB 1 GM 1 G: 1 TAB at 08:28

## 2019-11-29 RX ADMIN — HYDRALAZINE HYDROCHLORIDE 25 MG: 25 TABLET, FILM COATED ORAL at 23:59

## 2019-11-29 RX ADMIN — OXYCODONE HYDROCHLORIDE 10 MG: 5 TABLET ORAL at 05:13

## 2019-11-29 RX ADMIN — HYDRALAZINE HYDROCHLORIDE 25 MG: 25 TABLET, FILM COATED ORAL at 15:55

## 2019-11-30 VITALS
HEIGHT: 60 IN | OXYGEN SATURATION: 97 % | SYSTOLIC BLOOD PRESSURE: 148 MMHG | BODY MASS INDEX: 17.53 KG/M2 | DIASTOLIC BLOOD PRESSURE: 77 MMHG | WEIGHT: 89.29 LBS | RESPIRATION RATE: 17 BRPM | TEMPERATURE: 98 F | HEART RATE: 89 BPM

## 2019-11-30 LAB
BACTERIA SPEC AEROBE CULT: NORMAL
BACTERIA SPEC AEROBE CULT: NORMAL
VANCOMYCIN TROUGH SERPL-MCNC: 12.2 MCG/ML (ref 5–20)

## 2019-11-30 PROCEDURE — 25010000002 ONDANSETRON PER 1 MG: Performed by: FAMILY MEDICINE

## 2019-11-30 PROCEDURE — 99238 HOSP IP/OBS DSCHRG MGMT 30/<: CPT | Performed by: FAMILY MEDICINE

## 2019-11-30 PROCEDURE — 25010000002 ERTAPENEM PER 500 MG: Performed by: FAMILY MEDICINE

## 2019-11-30 PROCEDURE — 25010000002 MEROPENEM PER 100 MG: Performed by: FAMILY MEDICINE

## 2019-11-30 PROCEDURE — 25010000002 VANCOMYCIN PER 500 MG: Performed by: FAMILY MEDICINE

## 2019-11-30 RX ORDER — AMOXICILLIN 250 MG/5ML
750 POWDER, FOR SUSPENSION ORAL 2 TIMES DAILY
Qty: 300 ML | Refills: 0 | Status: ON HOLD | OUTPATIENT
Start: 2019-11-30 | End: 2019-12-18 | Stop reason: SDUPTHER

## 2019-11-30 RX ORDER — LIDOCAINE 50 MG/G
1 PATCH TOPICAL
Qty: 30 PATCH | Refills: 2 | Status: SHIPPED | OUTPATIENT
Start: 2019-11-30

## 2019-11-30 RX ORDER — SODIUM CHLORIDE 0.9 % (FLUSH) 0.9 %
10 SYRINGE (ML) INJECTION 2 TIMES DAILY PRN
Qty: 20 SYRINGE | Refills: 0 | Status: SHIPPED | OUTPATIENT
Start: 2019-11-30 | End: 2019-12-10

## 2019-11-30 RX ADMIN — OXYCODONE HYDROCHLORIDE 10 MG: 5 TABLET ORAL at 16:49

## 2019-11-30 RX ADMIN — ONDANSETRON 4 MG: 2 INJECTION INTRAMUSCULAR; INTRAVENOUS at 09:25

## 2019-11-30 RX ADMIN — ONDANSETRON 4 MG: 2 INJECTION INTRAMUSCULAR; INTRAVENOUS at 17:16

## 2019-11-30 RX ADMIN — ONDANSETRON 4 MG: 2 INJECTION INTRAMUSCULAR; INTRAVENOUS at 00:08

## 2019-11-30 RX ADMIN — VANCOMYCIN HYDROCHLORIDE 500 MG: 500 INJECTION, POWDER, LYOPHILIZED, FOR SOLUTION INTRAVENOUS at 01:13

## 2019-11-30 RX ADMIN — DAKIN'S SOLUTION 0.125% (QUARTER STRENGTH): 0.12 SOLUTION at 09:10

## 2019-11-30 RX ADMIN — OXYCODONE HYDROCHLORIDE 10 MG: 5 TABLET ORAL at 09:08

## 2019-11-30 RX ADMIN — Medication 1 PACKET: at 16:49

## 2019-11-30 RX ADMIN — Medication 1 PACKET: at 05:28

## 2019-11-30 RX ADMIN — ERTAPENEM SODIUM 1 G: 1 INJECTION, POWDER, LYOPHILIZED, FOR SOLUTION INTRAMUSCULAR; INTRAVENOUS at 16:49

## 2019-11-30 RX ADMIN — MEROPENEM 1 G: 1 INJECTION, POWDER, FOR SOLUTION INTRAVENOUS at 05:28

## 2019-11-30 RX ADMIN — CARVEDILOL 12.5 MG: 6.25 TABLET, FILM COATED ORAL at 17:16

## 2019-11-30 RX ADMIN — DEXTROSE MONOHYDRATE AND SODIUM CHLORIDE 75 ML/HR: 5; .9 INJECTION, SOLUTION INTRAVENOUS at 08:33

## 2019-11-30 RX ADMIN — CLOPIDOGREL BISULFATE 75 MG: 75 TABLET ORAL at 09:05

## 2019-11-30 RX ADMIN — LEVETIRACETAM 500 MG: 500 SOLUTION ORAL at 09:08

## 2019-11-30 RX ADMIN — CARVEDILOL 12.5 MG: 6.25 TABLET, FILM COATED ORAL at 09:06

## 2019-11-30 RX ADMIN — MEROPENEM 1 G: 1 INJECTION, POWDER, FOR SOLUTION INTRAVENOUS at 12:08

## 2019-11-30 RX ADMIN — Medication: at 00:36

## 2019-11-30 RX ADMIN — DEMECLOCYCLINE HYDROCHLORIDE 300 MG: 150 TABLET, FILM COATED ORAL at 09:07

## 2019-11-30 RX ADMIN — DAKIN'S SOLUTION 0.125% (QUARTER STRENGTH): 0.12 SOLUTION at 00:13

## 2019-11-30 RX ADMIN — SODIUM CHLORIDE TAB 1 GM 1 G: 1 TAB at 09:07

## 2019-11-30 RX ADMIN — Medication 10 ML: at 09:09

## 2019-12-01 ENCOUNTER — READMISSION MANAGEMENT (OUTPATIENT)
Dept: CALL CENTER | Facility: HOSPITAL | Age: 59
End: 2019-12-01

## 2019-12-01 NOTE — OUTREACH NOTE
Prep Survey      Responses   Facility patient discharged from?  [facility]   Is patient eligible?  Yes   Discharge diagnosis  Pneumonia,    Decubitus ulcer of the right ischium,     Fracture of the left clavicle,     DEBRIDEMENT OF ISCHEAL ULCER/BUTTOCKS WOUND   Does the patient have one of the following disease processes/diagnoses(primary or secondary)?  COPD/Pneumonia   Does the patient have Home health ordered?  Yes   What is the Home health agency?   [facility] for IV antibiotics   Is there a DME ordered?  Yes   What DME was ordered?  [facility] - wheelchair   General alerts for this patient  Patient's daughter is a nurse   Prep survey completed?  Yes          Natividad Brunner RN

## 2019-12-02 NOTE — PROGRESS NOTES
Continued Stay Note  ALFREDO Lindquist     Patient Name: Sri Bobo  MRN: [id number]  Today's Date: 12/2/2019    Admit Date: 11/25/2019    Discharge Plan     Row Name 12/02/19 1010       Plan    Final Discharge Disposition Code  01 - home or self-care        Pt's dtr/Carole to help with IV abs at home ,no need for HHC.          Expected Discharge Date and Time     Expected Discharge Date Expected Discharge Time    Nov 30, 2019             Jo Amor RN

## 2019-12-03 ENCOUNTER — READMISSION MANAGEMENT (OUTPATIENT)
Dept: CALL CENTER | Facility: HOSPITAL | Age: 59
End: 2019-12-03

## 2019-12-03 NOTE — OUTREACH NOTE
COPD/PN Week 1 Survey      Responses   Facility patient discharged from?  [facility]   Does the patient have one of the following disease processes/diagnoses(primary or secondary)?  COPD/Pneumonia   Is there a successful TCM telephone encounter documented?  No   Week 1 attempt successful?  Yes   Call start time  1421   Call end time  1423   General alerts for this patient  Patient's daughter is a nurse   Discharge diagnosis  Pneumonia,    Decubitus ulcer of the right ischium,     Fracture of the left clavicle,     DEBRIDEMENT OF ISCHEAL ULCER/BUTTOCKS WOUND   Meds reviewed with patient/caregiver?  Yes   Is the patient having any side effects they believe may be caused by any medication additions or changes?  No   Does the patient have all medications ordered at discharge?  Yes   Is the patient taking all medications as directed (includes completed medication regime)?  Yes   Does the patient have a primary care provider?   Yes   Does the patient have an appointment with their PCP or pulmonologist within 7 days of discharge?  Yes   Has the patient kept scheduled appointments due by today?  Yes   What is the Home health agency?   [facility] for IV antibiotics   Has home health visited the patient within 72 hours of discharge?  Call prior to 72 hours   Home health comments  Reports that HH didnt reach out to her. She reached out to them. I looked in the chart apparently her RN daughter will be doing IV infusions now.   What DME was ordered?  [facility] - wheelchair   Has all DME been delivered?  No   DME interventions  Other   DME comments  Contacted CM.   Psychosocial issues?  Yes   Psychosocial comments  Pt reports that she didn't receive wheelchair.   Notified Case Management  Psychosocial (Non Urgent)   Did the patient receive a copy of their discharge instructions?  Yes   Nursing interventions  Reviewed instructions with patient   What is the patient's perception of their health status since discharge?  Improving    Nursing Interventions  Nurse provided patient education   Is the patient/caregiver able to teach back signs and symptoms of worsening condition:  Fever/chills, Shortness of breath, Chest pain   Is the patient/caregiver able to teach back importance of completing antibiotic course of treatment?  Yes   Week 1 call completed?  Yes          Jevon Lang RN

## 2019-12-04 LAB — BACTERIA SPEC ANAEROBE CULT: ABNORMAL

## 2019-12-10 ENCOUNTER — READMISSION MANAGEMENT (OUTPATIENT)
Dept: CALL CENTER | Facility: HOSPITAL | Age: 59
End: 2019-12-10

## 2019-12-10 NOTE — OUTREACH NOTE
COPD/PN Week 2 Survey      Responses   Facility patient discharged from?  [facility]   Does the patient have one of the following disease processes/diagnoses(primary or secondary)?  COPD/Pneumonia   Was the primary reason for admission:  Pneumonia   Week 2 attempt successful?  Yes   Call start time  1547   Call end time  1551   Discharge diagnosis  Pneumonia,    Decubitus ulcer of the right ischium,     Fracture of the left clavicle,     DEBRIDEMENT OF ISCHEAL ULCER/BUTTOCKS WOUND   Is patient permission given to speak with other caregiver?  Yes   List who call center can speak with  Carole, daughter   Person spoke with today (if not patient) and relationship  Carole, daughter   Meds reviewed with patient/caregiver?  Yes   Is the patient taking all medications as directed (includes completed medication regime)?  Yes   Does the patient have a primary care provider?   Yes   Has the patient kept scheduled appointments due by today?  Yes   What is the Home health agency?   [facility] for IV antibiotics   Home health comments  Daughter states that she is doing IV antibiotics.    Psychosocial issues?  No   Did the patient receive a copy of their discharge instructions?  Yes   Nursing interventions  Reviewed instructions with patient   What is the patient's perception of their health status since discharge?  Improving   Nursing Interventions  Nurse provided patient education   Is the patient/caregiver able to teach back the hierarchy of who to call/visit for symptoms/problems? PCP, Specialist, Home health nurse, Urgent Care, ED, 911  Yes   Is the patient/caregiver able to teach back signs and symptoms of worsening condition:  Fever/chills, Shortness of breath, Chest pain   Is the patient/caregiver able to teach back importance of completing antibiotic course of treatment?  Yes   Week 2 call completed?  Yes          Kelsey Restrepo RN

## 2019-12-11 RX ORDER — CLOPIDOGREL BISULFATE 75 MG/1
TABLET ORAL
Qty: 30 TABLET | Refills: 0 | Status: SHIPPED | OUTPATIENT
Start: 2019-12-11

## 2019-12-11 RX ORDER — SODIUM CHLORIDE 1000 MG
TABLET, SOLUBLE MISCELLANEOUS
Qty: 60 TABLET | Refills: 0 | Status: SHIPPED | OUTPATIENT
Start: 2019-12-11

## 2019-12-12 ENCOUNTER — APPOINTMENT (OUTPATIENT)
Dept: GENERAL RADIOLOGY | Facility: HOSPITAL | Age: 59
End: 2019-12-12

## 2019-12-12 ENCOUNTER — APPOINTMENT (OUTPATIENT)
Dept: CT IMAGING | Facility: HOSPITAL | Age: 59
End: 2019-12-12

## 2019-12-12 ENCOUNTER — HOSPITAL ENCOUNTER (INPATIENT)
Facility: HOSPITAL | Age: 59
LOS: 5 days | Discharge: HOME | End: 2019-12-18
Attending: FAMILY MEDICINE | Admitting: FAMILY MEDICINE

## 2019-12-12 DIAGNOSIS — J18.9 PNEUMONIA OF BOTH LOWER LOBES DUE TO INFECTIOUS ORGANISM: Primary | ICD-10-CM

## 2019-12-12 DIAGNOSIS — K59.00 CONSTIPATION, UNSPECIFIED CONSTIPATION TYPE: ICD-10-CM

## 2019-12-12 DIAGNOSIS — B34.8 RHINOVIRUS: ICD-10-CM

## 2019-12-12 DIAGNOSIS — Z43.1 ATTENTION TO GASTROSTOMY TUBE (HCC): ICD-10-CM

## 2019-12-12 DIAGNOSIS — I10 BENIGN ESSENTIAL HYPERTENSION: ICD-10-CM

## 2019-12-12 DIAGNOSIS — S22.069A CLOSED FRACTURE OF EIGHTH THORACIC VERTEBRA, UNSPECIFIED FRACTURE MORPHOLOGY, INITIAL ENCOUNTER (HCC): ICD-10-CM

## 2019-12-12 DIAGNOSIS — R50.9 FEVER, UNSPECIFIED FEVER CAUSE: ICD-10-CM

## 2019-12-12 PROBLEM — E87.1 HYPONATREMIA: Status: RESOLVED | Noted: 2018-07-24 | Resolved: 2019-12-12

## 2019-12-12 PROBLEM — M79.7 FIBROMYOSITIS: Status: RESOLVED | Noted: 2018-08-06 | Resolved: 2019-12-12

## 2019-12-12 LAB
ANION GAP SERPL CALCULATED.3IONS-SCNC: 13 MMOL/L (ref 5–15)
B PERT DNA SPEC QL NAA+PROBE: NOT DETECTED
BASOPHILS # BLD AUTO: 0 10*3/MM3 (ref 0–0.2)
BASOPHILS NFR BLD AUTO: 0.1 % (ref 0–1.5)
BUN BLD-MCNC: 36 MG/DL (ref 8–23)
BUN/CREAT SERPL: 48.6 (ref 7–25)
C PNEUM DNA NPH QL NAA+NON-PROBE: NOT DETECTED
CALCIUM SPEC-SCNC: 8.7 MG/DL (ref 8.6–10.5)
CHLORIDE SERPL-SCNC: 95 MMOL/L (ref 98–107)
CO2 SERPL-SCNC: 26 MMOL/L (ref 22–29)
CREAT BLD-MCNC: 0.74 MG/DL (ref 0.57–1)
DEPRECATED RDW RBC AUTO: 51.6 FL (ref 37–54)
EOSINOPHIL # BLD AUTO: 0 10*3/MM3 (ref 0–0.4)
EOSINOPHIL NFR BLD AUTO: 0.2 % (ref 0.3–6.2)
ERYTHROCYTE [DISTWIDTH] IN BLOOD BY AUTOMATED COUNT: 15.9 % (ref 12.3–15.4)
FLUAV H1 2009 PAND RNA NPH QL NAA+PROBE: NOT DETECTED
FLUAV H1 HA GENE NPH QL NAA+PROBE: NOT DETECTED
FLUAV H3 RNA NPH QL NAA+PROBE: NOT DETECTED
FLUAV SUBTYP SPEC NAA+PROBE: NOT DETECTED
FLUBV RNA ISLT QL NAA+PROBE: NOT DETECTED
GFR SERPL CREATININE-BSD FRML MDRD: 80 ML/MIN/1.73
GLUCOSE BLD-MCNC: 115 MG/DL (ref 65–99)
HADV DNA SPEC NAA+PROBE: NOT DETECTED
HCOV 229E RNA SPEC QL NAA+PROBE: NOT DETECTED
HCOV HKU1 RNA SPEC QL NAA+PROBE: NOT DETECTED
HCOV NL63 RNA SPEC QL NAA+PROBE: NOT DETECTED
HCOV OC43 RNA SPEC QL NAA+PROBE: NOT DETECTED
HCT VFR BLD AUTO: 25.5 % (ref 34–46.6)
HGB BLD-MCNC: 8.1 G/DL (ref 12–15.9)
HMPV RNA NPH QL NAA+NON-PROBE: NOT DETECTED
HPIV1 RNA SPEC QL NAA+PROBE: NOT DETECTED
HPIV2 RNA SPEC QL NAA+PROBE: NOT DETECTED
HPIV3 RNA NPH QL NAA+PROBE: NOT DETECTED
HPIV4 P GENE NPH QL NAA+PROBE: NOT DETECTED
LYMPHOCYTES # BLD AUTO: 0.5 10*3/MM3 (ref 0.7–3.1)
LYMPHOCYTES NFR BLD AUTO: 5.7 % (ref 19.6–45.3)
M PNEUMO IGG SER IA-ACNC: NOT DETECTED
MCH RBC QN AUTO: 29.1 PG (ref 26.6–33)
MCHC RBC AUTO-ENTMCNC: 31.8 G/DL (ref 31.5–35.7)
MCV RBC AUTO: 91.5 FL (ref 79–97)
MONOCYTES # BLD AUTO: 0.6 10*3/MM3 (ref 0.1–0.9)
MONOCYTES NFR BLD AUTO: 7.8 % (ref 5–12)
NEUTROPHILS # BLD AUTO: 7 10*3/MM3 (ref 1.7–7)
NEUTROPHILS NFR BLD AUTO: 86.2 % (ref 42.7–76)
NRBC BLD AUTO-RTO: 0.1 /100 WBC (ref 0–0.2)
PLATELET # BLD AUTO: 293 10*3/MM3 (ref 140–450)
PMV BLD AUTO: 7.3 FL (ref 6–12)
POTASSIUM BLD-SCNC: 3.9 MMOL/L (ref 3.5–5.2)
RBC # BLD AUTO: 2.78 10*6/MM3 (ref 3.77–5.28)
RHINOVIRUS RNA SPEC NAA+PROBE: DETECTED
RSV RNA NPH QL NAA+NON-PROBE: NOT DETECTED
SODIUM BLD-SCNC: 134 MMOL/L (ref 136–145)
WBC NRBC COR # BLD: 8.1 10*3/MM3 (ref 3.4–10.8)

## 2019-12-12 PROCEDURE — 25010000002 LORAZEPAM PER 2 MG

## 2019-12-12 PROCEDURE — 99284 EMERGENCY DEPT VISIT MOD MDM: CPT

## 2019-12-12 PROCEDURE — 85025 COMPLETE CBC W/AUTO DIFF WBC: CPT | Performed by: NURSE PRACTITIONER

## 2019-12-12 PROCEDURE — 74176 CT ABD & PELVIS W/O CONTRAST: CPT

## 2019-12-12 PROCEDURE — 71046 X-RAY EXAM CHEST 2 VIEWS: CPT

## 2019-12-12 PROCEDURE — 25010000002 MEROPENEM PER 100 MG: Performed by: NURSE PRACTITIONER

## 2019-12-12 PROCEDURE — 25010000002 HEPARIN (PORCINE) PER 1000 UNITS: Performed by: FAMILY MEDICINE

## 2019-12-12 PROCEDURE — 25010000002 HYDRALAZINE PER 20 MG: Performed by: INTERNAL MEDICINE

## 2019-12-12 PROCEDURE — 87040 BLOOD CULTURE FOR BACTERIA: CPT | Performed by: NURSE PRACTITIONER

## 2019-12-12 PROCEDURE — 80048 BASIC METABOLIC PNL TOTAL CA: CPT | Performed by: NURSE PRACTITIONER

## 2019-12-12 PROCEDURE — 99222 1ST HOSP IP/OBS MODERATE 55: CPT | Performed by: FAMILY MEDICINE

## 2019-12-12 PROCEDURE — 25010000002 ONDANSETRON PER 1 MG: Performed by: NURSE PRACTITIONER

## 2019-12-12 PROCEDURE — 25010000002 HYDRALAZINE PER 20 MG: Performed by: FAMILY MEDICINE

## 2019-12-12 PROCEDURE — 72110 X-RAY EXAM L-2 SPINE 4/>VWS: CPT

## 2019-12-12 PROCEDURE — 25010000002 MORPHINE PER 10 MG: Performed by: FAMILY MEDICINE

## 2019-12-12 PROCEDURE — 25010000002 LEVETIRACETAM IN NACL 0.82% 500 MG/100ML SOLUTION: Performed by: FAMILY MEDICINE

## 2019-12-12 PROCEDURE — G0378 HOSPITAL OBSERVATION PER HR: HCPCS

## 2019-12-12 PROCEDURE — 0099U HC BIOFIRE FILMARRAY RESP PANEL 1: CPT | Performed by: NURSE PRACTITIONER

## 2019-12-12 RX ORDER — SODIUM CHLORIDE 9 MG/ML
75 INJECTION, SOLUTION INTRAVENOUS CONTINUOUS
Status: DISCONTINUED | OUTPATIENT
Start: 2019-12-12 | End: 2019-12-15

## 2019-12-12 RX ORDER — HEPARIN SODIUM 5000 [USP'U]/ML
5000 INJECTION, SOLUTION INTRAVENOUS; SUBCUTANEOUS EVERY 12 HOURS SCHEDULED
Status: DISCONTINUED | OUTPATIENT
Start: 2019-12-12 | End: 2019-12-18 | Stop reason: HOSPADM

## 2019-12-12 RX ORDER — DEMECLOCYCLINE HYDROCHLORIDE 150 MG/1
300 TABLET, FILM COATED ORAL EVERY 12 HOURS SCHEDULED
Status: DISPENSED | OUTPATIENT
Start: 2019-12-12 | End: 2019-12-17

## 2019-12-12 RX ORDER — MORPHINE SULFATE 4 MG/ML
2 INJECTION, SOLUTION INTRAMUSCULAR; INTRAVENOUS EVERY 4 HOURS PRN
Status: DISCONTINUED | OUTPATIENT
Start: 2019-12-12 | End: 2019-12-15

## 2019-12-12 RX ORDER — HYDRALAZINE HYDROCHLORIDE 25 MG/1
25 TABLET, FILM COATED ORAL EVERY 12 HOURS SCHEDULED
Status: DISCONTINUED | OUTPATIENT
Start: 2019-12-12 | End: 2019-12-12

## 2019-12-12 RX ORDER — LORAZEPAM 2 MG/ML
INJECTION INTRAMUSCULAR
Status: COMPLETED
Start: 2019-12-12 | End: 2019-12-12

## 2019-12-12 RX ORDER — CLOPIDOGREL BISULFATE 75 MG/1
75 TABLET ORAL DAILY
Status: DISCONTINUED | OUTPATIENT
Start: 2019-12-12 | End: 2019-12-18 | Stop reason: HOSPADM

## 2019-12-12 RX ORDER — HYDRALAZINE HYDROCHLORIDE 20 MG/ML
20 INJECTION INTRAMUSCULAR; INTRAVENOUS EVERY 6 HOURS SCHEDULED
Status: DISCONTINUED | OUTPATIENT
Start: 2019-12-12 | End: 2019-12-13

## 2019-12-12 RX ORDER — OXYCODONE HYDROCHLORIDE 5 MG/1
10 TABLET ORAL 4 TIMES DAILY PRN
Status: DISCONTINUED | OUTPATIENT
Start: 2019-12-12 | End: 2019-12-18 | Stop reason: HOSPADM

## 2019-12-12 RX ORDER — CALCIUM CARBONATE 200(500)MG
2 TABLET,CHEWABLE ORAL 2 TIMES DAILY PRN
Status: DISCONTINUED | OUTPATIENT
Start: 2019-12-12 | End: 2019-12-18 | Stop reason: HOSPADM

## 2019-12-12 RX ORDER — ONDANSETRON 2 MG/ML
4 INJECTION INTRAMUSCULAR; INTRAVENOUS ONCE
Status: COMPLETED | OUTPATIENT
Start: 2019-12-12 | End: 2019-12-12

## 2019-12-12 RX ORDER — AMITRIPTYLINE HYDROCHLORIDE 50 MG/1
100 TABLET, FILM COATED ORAL NIGHTLY
Status: DISCONTINUED | OUTPATIENT
Start: 2019-12-12 | End: 2019-12-18 | Stop reason: HOSPADM

## 2019-12-12 RX ORDER — SODIUM CHLORIDE 0.9 % (FLUSH) 0.9 %
10 SYRINGE (ML) INJECTION EVERY 12 HOURS SCHEDULED
Status: DISCONTINUED | OUTPATIENT
Start: 2019-12-12 | End: 2019-12-18 | Stop reason: HOSPADM

## 2019-12-12 RX ORDER — LABETALOL HYDROCHLORIDE 5 MG/ML
10 INJECTION, SOLUTION INTRAVENOUS EVERY 6 HOURS PRN
Status: DISCONTINUED | OUTPATIENT
Start: 2019-12-12 | End: 2019-12-18 | Stop reason: HOSPADM

## 2019-12-12 RX ORDER — LORAZEPAM 2 MG/ML
0.5 INJECTION INTRAMUSCULAR ONCE
Status: COMPLETED | OUTPATIENT
Start: 2019-12-12 | End: 2019-12-12

## 2019-12-12 RX ORDER — HYDRALAZINE HYDROCHLORIDE 20 MG/ML
10 INJECTION INTRAMUSCULAR; INTRAVENOUS ONCE
Status: COMPLETED | OUTPATIENT
Start: 2019-12-12 | End: 2019-12-12

## 2019-12-12 RX ORDER — TIZANIDINE 4 MG/1
4 TABLET ORAL EVERY 8 HOURS PRN
COMMUNITY

## 2019-12-12 RX ORDER — FAMOTIDINE 20 MG/1
40 TABLET, FILM COATED ORAL DAILY
Status: DISCONTINUED | OUTPATIENT
Start: 2019-12-12 | End: 2019-12-12

## 2019-12-12 RX ORDER — LEVETIRACETAM 500 MG/1
500 TABLET ORAL EVERY 12 HOURS SCHEDULED
Status: DISCONTINUED | OUTPATIENT
Start: 2019-12-12 | End: 2019-12-12

## 2019-12-12 RX ORDER — IPRATROPIUM BROMIDE AND ALBUTEROL SULFATE 2.5; .5 MG/3ML; MG/3ML
3 SOLUTION RESPIRATORY (INHALATION) EVERY 4 HOURS PRN
Status: DISCONTINUED | OUTPATIENT
Start: 2019-12-12 | End: 2019-12-18 | Stop reason: HOSPADM

## 2019-12-12 RX ORDER — ALUMINA, MAGNESIA, AND SIMETHICONE 2400; 2400; 240 MG/30ML; MG/30ML; MG/30ML
15 SUSPENSION ORAL 2 TIMES DAILY
Status: DISCONTINUED | OUTPATIENT
Start: 2019-12-12 | End: 2019-12-18 | Stop reason: HOSPADM

## 2019-12-12 RX ORDER — CLONIDINE 0.3 MG/24H
1 PATCH, EXTENDED RELEASE TRANSDERMAL WEEKLY
Status: DISCONTINUED | OUTPATIENT
Start: 2019-12-12 | End: 2019-12-18 | Stop reason: HOSPADM

## 2019-12-12 RX ORDER — LEVETIRACETAM 5 MG/ML
500 INJECTION INTRAVASCULAR EVERY 12 HOURS SCHEDULED
Status: DISCONTINUED | OUTPATIENT
Start: 2019-12-12 | End: 2019-12-13

## 2019-12-12 RX ORDER — FAMOTIDINE 40 MG/1
40 TABLET, FILM COATED ORAL DAILY
COMMUNITY

## 2019-12-12 RX ORDER — SODIUM CHLORIDE 0.9 % (FLUSH) 0.9 %
10 SYRINGE (ML) INJECTION AS NEEDED
Status: DISCONTINUED | OUTPATIENT
Start: 2019-12-12 | End: 2019-12-18 | Stop reason: HOSPADM

## 2019-12-12 RX ORDER — SODIUM CHLORIDE 1000 MG
1 TABLET, SOLUBLE MISCELLANEOUS 2 TIMES DAILY
Status: DISCONTINUED | OUTPATIENT
Start: 2019-12-12 | End: 2019-12-18 | Stop reason: HOSPADM

## 2019-12-12 RX ORDER — ONDANSETRON 2 MG/ML
4 INJECTION INTRAMUSCULAR; INTRAVENOUS EVERY 6 HOURS PRN
Status: DISCONTINUED | OUTPATIENT
Start: 2019-12-12 | End: 2019-12-18 | Stop reason: HOSPADM

## 2019-12-12 RX ORDER — CARVEDILOL 6.25 MG/1
12.5 TABLET ORAL 2 TIMES DAILY WITH MEALS
Status: DISCONTINUED | OUTPATIENT
Start: 2019-12-12 | End: 2019-12-18 | Stop reason: HOSPADM

## 2019-12-12 RX ORDER — FAMOTIDINE 10 MG/ML
20 INJECTION, SOLUTION INTRAVENOUS DAILY
Status: DISCONTINUED | OUTPATIENT
Start: 2019-12-13 | End: 2019-12-13

## 2019-12-12 RX ORDER — IPRATROPIUM BROMIDE AND ALBUTEROL SULFATE 2.5; .5 MG/3ML; MG/3ML
3 SOLUTION RESPIRATORY (INHALATION) EVERY 4 HOURS PRN
COMMUNITY

## 2019-12-12 RX ORDER — CYANOCOBALAMIN 1000 UG/ML
1000 INJECTION, SOLUTION INTRAMUSCULAR; SUBCUTANEOUS
Status: DISCONTINUED | OUTPATIENT
Start: 2019-12-12 | End: 2019-12-13

## 2019-12-12 RX ORDER — MORPHINE SULFATE 4 MG/ML
4 INJECTION, SOLUTION INTRAMUSCULAR; INTRAVENOUS EVERY 4 HOURS PRN
Status: DISCONTINUED | OUTPATIENT
Start: 2019-12-12 | End: 2019-12-15

## 2019-12-12 RX ORDER — ALBUTEROL SULFATE 90 UG/1
2 AEROSOL, METERED RESPIRATORY (INHALATION) EVERY 4 HOURS PRN
Status: DISCONTINUED | OUTPATIENT
Start: 2019-12-12 | End: 2019-12-18 | Stop reason: HOSPADM

## 2019-12-12 RX ADMIN — LORAZEPAM 0.5 MG: 2 INJECTION INTRAMUSCULAR at 11:56

## 2019-12-12 RX ADMIN — MORPHINE SULFATE 2 MG: 4 INJECTION INTRAVENOUS at 22:57

## 2019-12-12 RX ADMIN — LORAZEPAM 0.5 MG: 2 INJECTION INTRAMUSCULAR; INTRAVENOUS at 11:56

## 2019-12-12 RX ADMIN — LEVETIRACETAM 500 MG: 5 INJECTION INTRAVENOUS at 20:58

## 2019-12-12 RX ADMIN — CLONIDINE 1 PATCH: 0.3 PATCH, EXTENDED RELEASE TRANSDERMAL at 19:23

## 2019-12-12 RX ADMIN — DAKIN'S SOLUTION 0.125% (QUARTER STRENGTH): 0.12 SOLUTION at 20:58

## 2019-12-12 RX ADMIN — MEROPENEM 500 MG: 500 INJECTION, POWDER, FOR SOLUTION INTRAVENOUS at 16:09

## 2019-12-12 RX ADMIN — ONDANSETRON 4 MG: 2 INJECTION INTRAMUSCULAR; INTRAVENOUS at 11:56

## 2019-12-12 RX ADMIN — Medication 10 ML: at 20:58

## 2019-12-12 RX ADMIN — SODIUM CHLORIDE 75 ML/HR: 900 INJECTION, SOLUTION INTRAVENOUS at 19:30

## 2019-12-12 RX ADMIN — ALUMINUM HYDROXIDE, MAGNESIUM HYDROXIDE, AND DIMETHICONE 15 ML: 400; 400; 40 SUSPENSION ORAL at 21:34

## 2019-12-12 RX ADMIN — HYDRALAZINE HYDROCHLORIDE 20 MG: 20 INJECTION INTRAMUSCULAR; INTRAVENOUS at 19:23

## 2019-12-12 RX ADMIN — HYDRALAZINE HYDROCHLORIDE 10 MG: 20 INJECTION INTRAMUSCULAR; INTRAVENOUS at 17:31

## 2019-12-12 RX ADMIN — HEPARIN SODIUM 5000 UNITS: 5000 INJECTION INTRAVENOUS; SUBCUTANEOUS at 20:57

## 2019-12-12 NOTE — ED NOTES
Spoke with Kady LOPEZ re: g-tube flush but sluggish, no new orders     Michaela Villareal, RN  12/12/19 9524

## 2019-12-12 NOTE — ED NOTES
Pt reports bilateral flank back pain after a fall 2 nights ago, also g-tube came out this morning and pt's  replaced it with a new catheter, pt is very orally congested s/p throat cancer     Michaela Villareal, RN  12/12/19 9604

## 2019-12-12 NOTE — ED NOTES
Pt has g tube that came out in the bed last night,  reports that he put a sterile urinary catheter in it's place      Michaela Villareal, RN  12/12/19 1524

## 2019-12-12 NOTE — H&P
Patient Care Team:  Leonid Villeda Jr., MD as PCP - General  Leonid Villeda Jr., MD as PCP - Family Medicine    Chief complaint altered mental status    Subjective     The patient is a 59-year-old white female who was admitted to hospital with weakness and confusion.    The patient was just discharged from the hospital on November 30, 2019.  The patient was in the hospital at that time because of pneumonia pressure sore of the sacrun and fracture of the left clavicle.    Arrangements were made for the patient to receive IV antibiotics on discharge to complete the treatment for her pneumonia and a sacral ulcer.  She was also to take amoxicillin by mouth or by G-tube.  Home health and home physical therapy were arranged.    Patient has a history of tonsillar cancer.  The patient has had treatment for this.  The patient has severe oropharyngeal dysphagia as a result of this.  The patient is fed by G-tube.  She requires assistance for care at home.  She currently lives at home with her  who is a paraplegic.  They rely heavily on her daughter who is an RN for care.  Patient's daughter however states that she cannot meet the needs her mother at this time.    The patient's  states that she was complaining of abdominal pain today.  When he examined her abdominal wall area her gastrostomy tube had been pulled out.  He placed a Valderrama catheter into the gastrostomy tube site.  He brought her to the emergency room.    The member states that the patient has been confused since she has been home.    Review of Systems   Constitutional: Positive for fatigue. Negative for chills and fever.   HENT: Negative for sinus pain, sore throat and trouble swallowing.    Eyes: Negative for pain and discharge.   Respiratory: Positive for cough. Negative for shortness of breath.    Cardiovascular: Negative for chest pain and leg swelling.   Gastrointestinal: Negative for abdominal pain, diarrhea, nausea and vomiting.    Endocrine: Negative for cold intolerance, heat intolerance, polydipsia, polyphagia and polyuria.   Genitourinary: Negative for dysuria and frequency.   Musculoskeletal: Negative for arthralgias and myalgias.   Skin: Negative for color change and rash.   Neurological: Negative for dizziness, syncope and weakness.   Psychiatric/Behavioral: Negative for agitation, confusion and decreased concentration.          History  Past Medical History:   Diagnosis Date   • Anemia    • Broken hip (CMS/HCC) 2019    rt hip broken   • Cancer (CMS/HCC)     Throat   • Cerebral hemorrhage (CMS/HCC)    • Dislocated shoulder 2017    dislocated left shoulder   • Fall 2018    rt shoulder and elbow injured with fall, was inpt at [facility] ( to start PT in Dec 2018)   • Gastrostomy tube in place (CMS/[facility])    • GERD (gastroesophageal reflux disease)    •  1 para 1     [id number]   • Hip pain     lt   • History of right common carotid artery stent placement    • Hypertension    • Pneumonia 2018   • Renal failure, chronic, stage 3 (moderate) (CMS/HCC)    • Seizures (CMS/[facility])    • Shingles 2017   • Vitamin B 12 deficiency      Past Surgical History:   Procedure Laterality Date   • BUNIONECTOMY     • CAROTID STENT Right    • DEBRIDEMENT OF ISCHEAL ULCER/BUTTOCKS WOUND Right 2019    Procedure: DEBRIDEMENT OF ISCHEAL ULCER/BUTTOCKS WOUND;  Surgeon: Melanie Zuñiga MD;  Location: [facility] MAIN OR;  Service: General   • ESOPHAGEAL DILATATION      botox injection   • GTUBE INSERTION     • HIP SURGERY Right 2019    rt hip broken   • ORIF ULNA/RADIUS FRACTURES Right     ORIF, right distal radius fx 08/10/2018 metal plate in right shoulder   • WRIST FRACTURE SURGERY Left      Family History   Problem Relation Age of Onset   • Cancer Mother         lymphoma   • Stroke Father    • Diabetes Other    • Heart disease Other    • Hypertension Other      Social History     Tobacco Use   • Smoking status: Former Smoker   • Smokeless  tobacco: Never Used   • Tobacco comment: 5 years stopped smoking   Substance Use Topics   • Alcohol use: No     Frequency: Never   • Drug use: No     Medications Prior to Admission   Medication Sig Dispense Refill Last Dose   • albuterol sulfate  (90 Base) MCG/ACT inhaler Inhale 2 puffs Every 4 (Four) Hours As Needed for Wheezing. 1 inhaler 5 Taking   • amitriptyline (ELAVIL) 50 MG tablet Take 100 mg by mouth Every Night.   11/24/2019 at Unknown time   • amoxicillin (AMOXIL) 250 MG/5ML suspension Take 15 mL by mouth 2 (Two) Times a Day. (Patient taking differently: Take 750 mg by mouth 2 (Two) Times a Day. Filled 12/03/19 10 day supply) 300 mL 0    • calcium-vitamin D (OSCAL-500) 500-200 MG-UNIT per tablet Take 1 tablet by mouth 2 (Two) Times a Day.      • carvedilol (COREG) 12.5 MG tablet Take 12.5 mg by mouth 2 (Two) Times a Day With Meals.   11/25/2019 at Unknown time   • clopidogrel (PLAVIX) 75 MG tablet TAKE ONE TABLET BY MOUTH DAILY ** SEE DOCTOR FOR REFILLS ** (Patient taking differently: Take 75 mg by mouth Daily.) 30 tablet 0    • cyanocobalamin 1000 MCG/ML injection Inject 1 mL into the appropriate muscle as directed by prescriber Every 28 (Twenty-Eight) Days. 10 mL 1 11/18/2019   • demeclocycline (DECLOMYCIN) 300 MG tablet Take 300 mg by mouth 2 (Two) Times a Day.  11 11/25/2019 at Unknown time   • famotidine (PEPCID) 40 MG tablet Take 40 mg by mouth Daily.      • hydrALAZINE (APRESOLINE) 25 MG tablet Take 25 mg by mouth 2 (Two) Times a Day As Needed.   Taking   • ipratropium-albuterol (DUO-NEB) 0.5-2.5 mg/3 ml nebulizer Take 3 mL by nebulization Every 4 (Four) Hours As Needed for Wheezing.      • levETIRAcetam (KEPPRA) 500 MG tablet Take 500 mg by mouth 2 (Two) Times a Day.   11/25/2019 at Unknown time   • lidocaine (LIDODERM) 5 % Place 1 patch on the skin as directed by provider Daily. Remove & Discard patch within 12 hours or as directed by MD 30 patch 2    • oxyCODONE (ROXICODONE) 10 MG tablet  Take 1 tablet by mouth 4 (Four) Times a Day As Needed for Moderate Pain . 120 tablet 0 11/25/2019 at Unknown time   • sodium chloride 1 g tablet TAKE ONE TABLET TWICE A DAY PER G-TUBE ROUTE WITH MEALS (Patient taking differently: Take 1 g by mouth 2 (Two) Times a Day. Pt has not picked up from pharmacy) 60 tablet 0    • sodium hypochlorite (DAKIN'S) 0.125 % topical solution Apply  topically to the appropriate area as directed Every 12 (Twelve) Hours. 1200 mL 3    • tiZANidine (ZANAFLEX) 4 MG tablet Take 4 mg by mouth Every 8 (Eight) Hours As Needed for Muscle Spasms.        Allergies:  Codeine and Sulfa antibiotics    Objective     Vital Signs  Temp:  [98.8 °F (37.1 °C)-100.4 °F (38 °C)] 98.8 °F (37.1 °C)  Heart Rate:  [88-95] 92  Resp:  [14] 14  BP: (149-221)/() 221/103    Physical Exam   Constitutional: She is oriented to person, place, and time. She appears well-developed and well-nourished.   HENT:   Patient's not able to open her mouth wide enough for me to examine it adequately.  What I can see reveals poor dentition.   Eyes: Pupils are equal, round, and reactive to light. EOM are normal.   Neck: Neck supple.   Cardiovascular: Normal rate, regular rhythm, normal heart sounds and intact distal pulses.   Pulmonary/Chest: Effort normal and breath sounds normal.   Abdominal: Soft. Bowel sounds are normal.   Musculoskeletal: Normal range of motion.   Neurological: She is oriented to person, place, and time.   Skin: Skin is warm and dry.   Psychiatric: She has a normal mood and affect. Her behavior is normal. Judgment and thought content normal.   Nursing note and vitals reviewed.       Results Review:   Lab Results (last 24 hours)     Procedure Component Value Units Date/Time    Respiratory Panel, PCR - Swab, Nasopharynx [[id number]]  (Abnormal) Collected:  12/12/19 1204    Specimen:  Swab from Nasopharynx Updated:  12/12/19 1323     ADENOVIRUS, PCR Not Detected     Coronavirus 229E Not Detected      Coronavirus HKU1 Not Detected     Coronavirus NL63 Not Detected     Coronavirus OC43 Not Detected     Human Metapneumovirus Not Detected     Human Rhinovirus/Enterovirus Detected     Influenza B PCR Not Detected     Parainfluenza Virus 1 Not Detected     Parainfluenza Virus 2 Not Detected     Parainfluenza Virus 3 Not Detected     Parainfluenza Virus 4 Not Detected     Bordetella pertussis pcr Not Detected     Influenza A H1 2009 PCR Not Detected     Chlamydophila pneumoniae PCR Not Detected     Mycoplasma pneumo by PCR Not Detected     Influenza A PCR Not Detected     Influenza A H3 Not Detected     Influenza A H1 Not Detected     RSV, PCR Not Detected    Basic Metabolic Panel [[id number]]  (Abnormal) Collected:  12/12/19 1157    Specimen:  Blood Updated:  12/12/19 1236     Glucose 115 mg/dL      BUN 36 mg/dL      Creatinine 0.74 mg/dL      Sodium 134 mmol/L      Potassium 3.9 mmol/L      Chloride 95 mmol/L      CO2 26.0 mmol/L      Calcium 8.7 mg/dL      eGFR Non African Amer 80 mL/min/1.73      BUN/Creatinine Ratio 48.6     Anion Gap 13.0 mmol/L     Narrative:       GFR Normal >60  Chronic Kidney Disease <60  Kidney Failure <15      Blood Culture - Blood, Arm, Right [[id number]] Collected:  12/12/19 1212    Specimen:  Blood from Arm, Right Updated:  12/12/19 1219    CBC & Differential [[id number]] Collected:  12/12/19 1157    Specimen:  Blood Updated:  12/12/19 1207    Narrative:       The following orders were created for panel order CBC & Differential.  Procedure                               Abnormality         Status                     ---------                               -----------         ------                     CBC Auto Differential[[id number]]        Abnormal            Final result                 Please view results for these tests on the individual orders.    CBC Auto Differential [[id number]]  (Abnormal) Collected:  12/12/19 1157    Specimen:  Blood Updated:  12/12/19 1207     WBC 8.10 10*3/mm3       RBC 2.78 10*6/mm3      Hemoglobin 8.1 g/dL      Hematocrit 25.5 %      MCV 91.5 fL      MCH 29.1 pg      MCHC 31.8 g/dL      RDW 15.9 %      RDW-SD 51.6 fl      MPV 7.3 fL      Platelets 293 10*3/mm3      Neutrophil % 86.2 %      Lymphocyte % 5.7 %      Monocyte % 7.8 %      Eosinophil % 0.2 %      Basophil % 0.1 %      Neutrophils, Absolute 7.00 10*3/mm3      Lymphocytes, Absolute 0.50 10*3/mm3      Monocytes, Absolute 0.60 10*3/mm3      Eosinophils, Absolute 0.00 10*3/mm3      Basophils, Absolute 0.00 10*3/mm3      nRBC 0.1 /100 WBC     Blood Culture - Blood, Blood, Venous Line [[id number]] Collected:  12/12/19 1157    Specimen:  Blood, Venous Line Updated:  12/12/19 1203        Imaging Results (Last 24 Hours)     Procedure Component Value Units Date/Time    CT Abdomen Pelvis Without Contrast [[id number]] Collected:  12/12/19 1452     Updated:  12/12/19 1459    Narrative:       CT ABDOMEN PELVIS WO CONTRAST-     Date of Exam: 12/12/2019 2:45 PM     Indication: Abdominal pain, unspecified  . Constipation.     Comparison: None available.     Technique: Contiguous axial CT images were obtained from the lung bases  to the pubic symphysis without contrast. Sagittal and coronal  reconstructions were performed.  Automated exposure control and  iterative reconstruction methods were used.     FINDINGS:     Large colonic stool burden greatest in the cecum and ascending colon no  pneumatosis or free air or abscess.     Percutaneous gastrostomy tube in place.     No abnormally dilated small bowel loops are identified. Noncontrast  appearance of the liver, gallbladder, spleen, adrenals and kidneys is  within normal limits. Pancreas is moderately atrophic.     Dense airspace disease changes are present in the right middle lobe and  right lower lobe. Noncalcified nodular densities are present in the left  lower lobe measuring 4 mm (image 6) and 7 mm (image 7), with mild left  basilar atelectasis. There is moderate cardiomegaly.      Old bilateral pubic body and right inferior pubic rami fractures. ORIF  changes of the right femur. Chronic appearing bilateral sacral ala  fractures. Chronic appearing fracture of L5 vertebral body with  compression. Grade 1-2 anterolisthesis L4 upon L5, approximately 13 mm,  thought to be related to severe facet arthropathy.          Impression:          1. Large colonic stool burden, most severe within the cecum and  ascending colon, consistent with the appearance of constipation.  2. No obstructing bowel abnormality is seen, and there is no evidence of  active bowel inflammation.  3. Dense right middle lobe and right lower lobe airspace disease.  Correlate clinically for pneumonia.  4. Small nodular densities in the left lower lobe are nonspecific but  favored to reflect benign infectious/inflammatory nodules.  5. Chronic appearing fractures of the pelvis and L5 vertebral body.  Right femur ORIF  6. Grade 1-2 anterolisthesis L5 upon S1 thought to be related to severe  facet arthropathy.           Electronically Signed By-Dr. Ashley Jerez MD On:12/12/2019 2:57 PM  This report was finalized on [id number] by Dr. Ashley Jerez MD.    XR Chest 2 View [[id number]] Collected:  12/12/19 1114     Updated:  12/12/19 1120    Narrative:       DATE OF EXAM:  12/12/2019 10:48 AM     PROCEDURE:  XR CHEST 2 VW-     INDICATIONS:  cough       COMPARISON:  CT chest 11/26/2019. AP portable chest 11/25/2019.     TECHNIQUE:   Two radiologic views of the chest.     FINDINGS:  Right lower lobe airspace disease persists, and appears fairly similar  to the 11/25/2019 examination. There is new ill-defined airspace disease  in the left lower lobe.     Stable cardiac enlargement. Left chest wall luis catheter remains at  the cavoatrial junction. No visible pneumothorax. Surgical changes of  the right humerus. Degenerative changes of the left humerus. Chronic  appearing deformity of the distal left clavicle. Diffuse osteopenia.      There is a chronic appearing severe compression fracture of the T5  vertebral body, chronic appearing compression fracture of the superior  endplate of T4, corresponding to the CT chest from 11/26/2019. There is,  however, an age-indeterminate compression fracture of T8, new since  11/26/2019 CT chest.       Impression:          1. Persistent right lower lobe airspace disease worrisome for pneumonia.  New left lower lobe airspace disease worrisome for developing pneumonia.  2. Age indeterminate compression fracture of T8, new since 11/26/2019  comparison. Chronic T4 and T5 compression fractures.     Electronically Signed By-Dr. Ashley Jerez MD On:12/12/2019 11:18 AM  This report was finalized on [id number] by Dr. Ashley Jerez MD.    XR Spine Lumbar Complete 4+VW [[id number]] Collected:  12/12/19 1114     Updated:  12/12/19 1118    Narrative:       XR SPINE LUMBAR COMPLETE 4+VW-     Date of Exam: 12/12/2019 10:22 AM     Indication: Low back pain after fall.     Comparison Exams: September 6, 2017     Technique: 5 radiographs of the lumbar spine     FINDINGS:  No acute fracture is identified. There is generalized osteopenia. The  alignment appears stable, with grade 1/2 anterolisthesis of L4 on L5.  The vertebral body heights appear normal. There is moderate degenerative  loss of disc height at L4-5 and L5-S1. There is mild to moderate facet  arthropathy at L3-4 through L5-S1. The soft tissues are unremarkable.       Impression:       1.No acute fracture or traumatic subluxation of lumbar spine identified.  2.Degenerative changes as described above.     Electronically Signed By-DR. Jevon Dominguez MD On:12/12/2019 11:16 AM  This report was finalized on [id number] by DR. Jevon Dominguez MD.         ECG/EMG Results (last 24 hours)     ** No results found for the last 24 hours. **           I reviewed the patient's new clinical results.    Principal Problem:    Pneumonia due to infectious organism-patient  will be cultured and placed on antibiotic therapy empirically.  The patient did test positive for rhinovirus.  Patient be seen by pulmonary medicine.    Active Problems:    Dysphagia, pharyngoesophageal phase-unchanged-patient will be kept n.p.o.  Gastrostomy tube will be replaced.      Decubitus ulcer of ischial area, right, stage IV (CMS/HCC) and unchanged-obesity patient will be seen in consultation by wound therapy once again.      Closed displaced fracture of acromial end of left clavicle stable       Hypertension-patient's blood pressures been elevated since being in the hospital.  She will be placed on a Cardene drip and seen by nephrology.         Assessment & Plan    I discussed the patients findings and my recommendations with patient, family and nursing staff.     Leonid Villeda Jr., MD  12/12/19  5:42 PM

## 2019-12-12 NOTE — ED PROVIDER NOTES
"Subjective   Chief complaint: Accidental removal PEG tube      Context: Patient is a 59-year-old chronically ill female lives with son brought to ER complains of PEG tube fell out at 1 AM.  Has had PEG tube since , son reports he put in 1 of his sterile Valderrama catheters.  He reports she slid out of bed 2 days ago and was having lower back pain, was supposed to see her PCP today.  Patient has a history of throat cancer but is currently in remission.  Reports taking antibiotics 2 weeks ago and completing them for lung infection.  Patient has productive cough reports as normal for her and uses a suction cath for her secretions.    Duration: This a.m.    Timing: Not applicable    Severity: Not applicable  Associated symptoms: None          PCP: Ronal    LNMP: Postmenopausal            Review of Systems   Constitutional: Negative.    HENT: Positive for congestion.    Respiratory: Positive for cough.    Cardiovascular: Negative.    Gastrointestinal: Negative.    Genitourinary: Negative.    Musculoskeletal: Positive for back pain.   Skin: Negative.    Neurological: Negative.    Psychiatric/Behavioral: Negative.        Past Medical History:   Diagnosis Date   • Anemia    • Broken hip (CMS/HCC) 2019    rt hip broken   • Cancer (CMS/HCC)     Throat   • Cerebral hemorrhage (CMS/HCC)    • Dislocated shoulder 2017    dislocated left shoulder   • Fall 2018    rt shoulder and elbow injured with fall, was inpt at [facility] ( to start PT in Dec 2018)   • Gastrostomy tube in place (CMS/HCC)    • GERD (gastroesophageal reflux disease)    •  1 para 1     [id number]   • Hip pain     lt   • History of right common carotid artery stent placement    • Hypertension    • Pneumonia 2018   • Renal failure, chronic, stage 3 (moderate) (CMS/HCC)    • Seizures (CMS/HCC)    • Shingles 2017   • Vitamin B 12 deficiency        Allergies   Allergen Reactions   • Codeine Rash and Anaphylaxis     \"LIPS AND THROAT SWELL UP\"   • Sulfa " "Antibiotics Rash, Anaphylaxis and Swelling     \"LIPS AND THROAT SWELL UP\"       Past Surgical History:   Procedure Laterality Date   • BUNIONECTOMY     • CAROTID STENT Right    • DEBRIDEMENT OF ISCHEAL ULCER/BUTTOCKS WOUND Right 11/26/2019    Procedure: DEBRIDEMENT OF ISCHEAL ULCER/BUTTOCKS WOUND;  Surgeon: Melanie Zuñiga MD;  Location: [facility] MAIN OR;  Service: General   • ESOPHAGEAL DILATATION      botox injection   • GTUBE INSERTION     • HIP SURGERY Right 03/2019    rt hip broken   • ORIF ULNA/RADIUS FRACTURES Right     ORIF, right distal radius fx 08/10/2018 metal plate in right shoulder   • WRIST FRACTURE SURGERY Left 2012       Family History   Problem Relation Age of Onset   • Cancer Mother         lymphoma   • Stroke Father    • Diabetes Other    • Heart disease Other    • Hypertension Other        Social History     Socioeconomic History   • Marital status:      Spouse name: Not on file   • Number of children: Not on file   • Years of education: Not on file   • Highest education level: Not on file   Tobacco Use   • Smoking status: Former Smoker   • Smokeless tobacco: Never Used   • Tobacco comment: 5 years stopped smoking   Substance and Sexual Activity   • Alcohol use: No     Frequency: Never   • Drug use: No   • Sexual activity: Defer           Objective   Physical Exam     Vital signs and traige nurse note reviewed.  Constitutional:  Awake, alert; well developed and well nourished.  No acute distress, the patient is examined in hospital gown.  Chronically ill.  HEENT:  Normocephalic, atraumatic; pupils are PERRL with intact EOM; oropharynx is pink and moist without edema or erythema.  Neck: Supple, full range of motion without pain; no cervical lymphadenopathy.  Cardiovascular: Regular rate and rhythm, normal S1-S2. .  Pulmonary: Respiratory effort regular, nonlabored; left lung clear to auscultation right lung diminished.  Abdomen: Soft, nontender, nondistended with normoactive bowel sounds; no " rebound or guarding.  Valderrama cath in place of PEG tube present.  Musculoskeletal: Independent range of motion of all extremities, no palpable tenderness or edema.   Back:  Spine is midline and without midline tenderness on palpation of cervical and thoracic spine. ; paraspinal musculature is nontender and without soft tissue swelling, overlying ecchymosis or erythema.  Tenderness over lumbar spine with palpation.  No step-off. ROM is without pain,  Distal muscle strength is 5/5.  Neuro: Alert oriented x3, speech is at baseline for patient, difficult to understand. DTRs are symmetrical bilateral lower extremity, negative Babinski BLE, negative straight leg raise BLE, strong and equal dorsiflexion of bilateral great toes L4, L5, S1 motor sensory intact.          Procedures           ED Course        .edlabs  Medications   sodium chloride 0.9 % flush 10 mL (has no administration in time range)   meropenem (MERREM) 500 mg in sodium chloride 0.9 % 100 mL IVPB (has no administration in time range)   ondansetron (ZOFRAN) injection 4 mg (4 mg Intravenous Given 12/12/19 1156)   LORazepam (ATIVAN) injection 0.5 mg (0.5 mg Intravenous Given 12/12/19 1156)     Ct Abdomen Pelvis Without Contrast    Result Date: 12/12/2019   1. Large colonic stool burden, most severe within the cecum and ascending colon, consistent with the appearance of constipation. 2. No obstructing bowel abnormality is seen, and there is no evidence of active bowel inflammation. 3. Dense right middle lobe and right lower lobe airspace disease. Correlate clinically for pneumonia. 4. Small nodular densities in the left lower lobe are nonspecific but favored to reflect benign infectious/inflammatory nodules. 5. Chronic appearing fractures of the pelvis and L5 vertebral body. Right femur ORIF 6. Grade 1-2 anterolisthesis L5 upon S1 thought to be related to severe facet arthropathy.    Electronically Signed By-Dr. Ashley Jerez MD On:12/12/2019 2:57 PM This report  was finalized on [id number] by Dr. Ashley Jerez MD.    Xr Chest 2 View    Result Date: 12/12/2019   1. Persistent right lower lobe airspace disease worrisome for pneumonia. New left lower lobe airspace disease worrisome for developing pneumonia. 2. Age indeterminate compression fracture of T8, new since 11/26/2019 comparison. Chronic T4 and T5 compression fractures.  Electronically Signed By-Dr. Ashley Jerez MD On:12/12/2019 11:18 AM This report was finalized on [id number] by Dr. Ashley Jerez MD.    Xr Spine Lumbar Complete 4+vw    Result Date: 12/12/2019  1.No acute fracture or traumatic subluxation of lumbar spine identified. 2.Degenerative changes as described above.  Electronically Signed By-DR. Jevon Dominguez MD On:12/12/2019 11:16 AM This report was finalized on [id number] by DR. Jevon Dominguez MD.                 No data recorded                        MDM  Number of Diagnoses or Management Options  Attention to gastrostomy tube (CMS/[facility]):   Closed fracture of eighth thoracic vertebra, unspecified fracture morphology, initial encounter (CMS/[facility]):   Constipation, unspecified constipation type:   Fever, unspecified fever cause:   Pneumonia of both lower lobes due to infectious organism (CMS/[facility]):   Rhinovirus:   Diagnosis management comments: Chart review:  11/25-11/30/19: pneumonia (right bibasilar), left clavicular fx, anemia, hyponatremia,  malignant neoplasm tonsillar fossa, stg IV ischial ulcer with debridement (Cultures of the wound grew extended spectrum beta-lactamase producing E. coli and enterococcus.  Enterococcus was sensitive to amoxicillin.)    Some delay in care due to not going to CAT scan    Medical decision  Comorbidities:  has a past medical history of Anemia, Broken hip (CMS/[facility]) (03/2019), Cancer (CMS/[facility]) (2013), Cerebral hemorrhage (CMS/[facility]), Dislocated shoulder (09/2017), Fall (08/2018), Gastrostomy tube in place (CMS/[facility]), GERD (gastroesophageal reflux disease),   1 para 1, Hip pain, History of right common carotid artery stent placement, Hypertension, Pneumonia (2018), Renal failure, chronic, stage 3 (moderate) (CMS/HCC), Seizures (CMS/HCC), Shingles (2017), and Vitamin B 12 deficiency.  Differentials: fracture, pna, virus  Discussion with provider: rivera  Radiology interpretation:  X-rays reviewed by me and interpreted by radiologist,   Ct Abdomen Pelvis Without Contrast    Result Date: 2019   1. Large colonic stool burden, most severe within the cecum and ascending colon, consistent with the appearance of constipation. 2. No obstructing bowel abnormality is seen, and there is no evidence of active bowel inflammation. 3. Dense right middle lobe and right lower lobe airspace disease. Correlate clinically for pneumonia. 4. Small nodular densities in the left lower lobe are nonspecific but favored to reflect benign infectious/inflammatory nodules. 5. Chronic appearing fractures of the pelvis and L5 vertebral body. Right femur ORIF 6. Grade 1-2 anterolisthesis L5 upon S1 thought to be related to severe facet arthropathy.    Electronically Signed By-Dr. Ashley Jerez MD On:2019 2:57 PM This report was finalized on [id number] by Dr. Ashley Jerez MD.    Xr Chest 2 View    Result Date: 2019   1. Persistent right lower lobe airspace disease worrisome for pneumonia. New left lower lobe airspace disease worrisome for developing pneumonia. 2. Age indeterminate compression fracture of T8, new since 2019 comparison. Chronic T4 and T5 compression fractures.  Electronically Signed By-Dr. Ashley Jerez MD On:2019 11:18 AM This report was finalized on [id number] by Dr. Ashley Jerez MD.    Xr Spine Lumbar Complete 4+vw    Result Date: 2019  1.No acute fracture or traumatic subluxation of lumbar spine identified. 2.Degenerative changes as described above.  Electronically Signed By-DR. Jevon Dominguez MD On:2019 11:16 AM  This report was finalized on [id number] by DR. Jevon Dominguez MD.    Lab interpretation:  Labs viewed by me significant for, rhinovirus positive; hemoglobin 8.1 baseline appears to be around 9    i discussed findings with patient and son who voices understanding of admission.  Patient was started on Merrem after discussion with pharmacist, cultures are pending.  Discussed with her PCP who agreed to admit         Amount and/or Complexity of Data Reviewed  Decide to obtain previous medical records or to obtain history from someone other than the patient: yes    Patient Progress  Patient progress: stable      Final diagnoses:   Pneumonia of both lower lobes due to infectious organism (CMS/[facility])   Attention to gastrostomy tube (CMS/[facility])   Constipation, unspecified constipation type   Rhinovirus   Closed fracture of eighth thoracic vertebra, unspecified fracture morphology, initial encounter (CMS/[facility])   Fever, unspecified fever cause              Kady Bearden, APRN  12/12/19 0621

## 2019-12-12 NOTE — ED NOTES
Pt g-tube flushed easily but heavy resistance when I tried to pull back with no aspirate returned     Michaela Villareal, RN  12/12/19 5009

## 2019-12-13 ENCOUNTER — READMISSION MANAGEMENT (OUTPATIENT)
Dept: CALL CENTER | Facility: HOSPITAL | Age: 59
End: 2019-12-13

## 2019-12-13 LAB
ALBUMIN SERPL-MCNC: 3.2 G/DL (ref 3.5–5.2)
ALBUMIN/GLOB SERPL: 0.8 G/DL
ALP SERPL-CCNC: 85 U/L (ref 39–117)
ALT SERPL W P-5'-P-CCNC: 8 U/L (ref 1–33)
ANION GAP SERPL CALCULATED.3IONS-SCNC: 14 MMOL/L (ref 5–15)
AST SERPL-CCNC: 14 U/L (ref 1–32)
BASOPHILS # BLD AUTO: 0 10*3/MM3 (ref 0–0.2)
BASOPHILS NFR BLD AUTO: 0.3 % (ref 0–1.5)
BILIRUB SERPL-MCNC: 0.4 MG/DL (ref 0.2–1.2)
BUN BLD-MCNC: 23 MG/DL (ref 8–23)
BUN/CREAT SERPL: 33.3 (ref 7–25)
CALCIUM SPEC-SCNC: 8.6 MG/DL (ref 8.6–10.5)
CHLORIDE SERPL-SCNC: 96 MMOL/L (ref 98–107)
CO2 SERPL-SCNC: 26 MMOL/L (ref 22–29)
CREAT BLD-MCNC: 0.69 MG/DL (ref 0.57–1)
DEPRECATED RDW RBC AUTO: 51.6 FL (ref 37–54)
EOSINOPHIL # BLD AUTO: 0 10*3/MM3 (ref 0–0.4)
EOSINOPHIL NFR BLD AUTO: 0.3 % (ref 0.3–6.2)
ERYTHROCYTE [DISTWIDTH] IN BLOOD BY AUTOMATED COUNT: 15.8 % (ref 12.3–15.4)
GFR SERPL CREATININE-BSD FRML MDRD: 87 ML/MIN/1.73
GLOBULIN UR ELPH-MCNC: 3.9 GM/DL
GLUCOSE BLD-MCNC: 89 MG/DL (ref 65–99)
HCT VFR BLD AUTO: 27.2 % (ref 34–46.6)
HGB BLD-MCNC: 8.9 G/DL (ref 12–15.9)
LYMPHOCYTES # BLD AUTO: 0.7 10*3/MM3 (ref 0.7–3.1)
LYMPHOCYTES NFR BLD AUTO: 9.6 % (ref 19.6–45.3)
MCH RBC QN AUTO: 30.1 PG (ref 26.6–33)
MCHC RBC AUTO-ENTMCNC: 32.8 G/DL (ref 31.5–35.7)
MCV RBC AUTO: 91.8 FL (ref 79–97)
MONOCYTES # BLD AUTO: 0.7 10*3/MM3 (ref 0.1–0.9)
MONOCYTES NFR BLD AUTO: 10 % (ref 5–12)
NEUTROPHILS # BLD AUTO: 5.5 10*3/MM3 (ref 1.7–7)
NEUTROPHILS NFR BLD AUTO: 79.8 % (ref 42.7–76)
PLATELET # BLD AUTO: 323 10*3/MM3 (ref 140–450)
PMV BLD AUTO: 7.7 FL (ref 6–12)
POTASSIUM BLD-SCNC: 3.7 MMOL/L (ref 3.5–5.2)
PROT SERPL-MCNC: 7.1 G/DL (ref 6–8.5)
RBC # BLD AUTO: 2.97 10*6/MM3 (ref 3.77–5.28)
SODIUM BLD-SCNC: 136 MMOL/L (ref 136–145)
WBC NRBC COR # BLD: 6.8 10*3/MM3 (ref 3.4–10.8)

## 2019-12-13 PROCEDURE — 97163 PT EVAL HIGH COMPLEX 45 MIN: CPT

## 2019-12-13 PROCEDURE — 25010000002 MORPHINE PER 10 MG: Performed by: FAMILY MEDICINE

## 2019-12-13 PROCEDURE — 99232 SBSQ HOSP IP/OBS MODERATE 35: CPT | Performed by: FAMILY MEDICINE

## 2019-12-13 PROCEDURE — 25010000002 CYANOCOBALAMIN PER 1000 MCG: Performed by: FAMILY MEDICINE

## 2019-12-13 PROCEDURE — 80053 COMPREHEN METABOLIC PANEL: CPT | Performed by: FAMILY MEDICINE

## 2019-12-13 PROCEDURE — 0D20XUZ CHANGE FEEDING DEVICE IN UPPER INTESTINAL TRACT, EXTERNAL APPROACH: ICD-10-PCS | Performed by: INTERNAL MEDICINE

## 2019-12-13 PROCEDURE — 85027 COMPLETE CBC AUTOMATED: CPT | Performed by: FAMILY MEDICINE

## 2019-12-13 PROCEDURE — 25010000002 HYDRALAZINE PER 20 MG: Performed by: INTERNAL MEDICINE

## 2019-12-13 PROCEDURE — 25010000002 MEROPENEM PER 100 MG: Performed by: FAMILY MEDICINE

## 2019-12-13 PROCEDURE — 25010000002 LEVETIRACETAM IN NACL 0.82% 500 MG/100ML SOLUTION: Performed by: FAMILY MEDICINE

## 2019-12-13 PROCEDURE — 25010000002 HEPARIN (PORCINE) PER 1000 UNITS: Performed by: FAMILY MEDICINE

## 2019-12-13 RX ORDER — FAMOTIDINE 20 MG/1
20 TABLET, FILM COATED ORAL DAILY
Status: DISCONTINUED | OUTPATIENT
Start: 2019-12-14 | End: 2019-12-18 | Stop reason: HOSPADM

## 2019-12-13 RX ORDER — LACTULOSE 10 G/15ML
10 SOLUTION ORAL 2 TIMES DAILY PRN
Status: DISCONTINUED | OUTPATIENT
Start: 2019-12-13 | End: 2019-12-18 | Stop reason: HOSPADM

## 2019-12-13 RX ORDER — CYANOCOBALAMIN 1000 UG/ML
1000 INJECTION, SOLUTION INTRAMUSCULAR; SUBCUTANEOUS
Status: DISCONTINUED | OUTPATIENT
Start: 2019-12-13 | End: 2019-12-18 | Stop reason: HOSPADM

## 2019-12-13 RX ORDER — LEVETIRACETAM 100 MG/ML
500 SOLUTION ORAL EVERY 12 HOURS SCHEDULED
Status: DISCONTINUED | OUTPATIENT
Start: 2019-12-13 | End: 2019-12-18 | Stop reason: HOSPADM

## 2019-12-13 RX ORDER — HYDRALAZINE HYDROCHLORIDE 25 MG/1
25 TABLET, FILM COATED ORAL EVERY 12 HOURS SCHEDULED
Status: DISCONTINUED | OUTPATIENT
Start: 2019-12-13 | End: 2019-12-14

## 2019-12-13 RX ADMIN — HYDRALAZINE HYDROCHLORIDE 25 MG: 25 TABLET, FILM COATED ORAL at 21:20

## 2019-12-13 RX ADMIN — SODIUM CHLORIDE 75 ML/HR: 900 INJECTION, SOLUTION INTRAVENOUS at 21:20

## 2019-12-13 RX ADMIN — MORPHINE SULFATE 4 MG: 4 INJECTION INTRAVENOUS at 16:31

## 2019-12-13 RX ADMIN — OXYCODONE HYDROCHLORIDE 10 MG: 5 TABLET ORAL at 10:15

## 2019-12-13 RX ADMIN — ALUMINUM HYDROXIDE, MAGNESIUM HYDROXIDE, AND DIMETHICONE 15 ML: 400; 400; 40 SUSPENSION ORAL at 08:03

## 2019-12-13 RX ADMIN — DAKIN'S SOLUTION 0.125% (QUARTER STRENGTH): 0.12 SOLUTION at 21:22

## 2019-12-13 RX ADMIN — MEROPENEM 500 MG: 500 INJECTION, POWDER, FOR SOLUTION INTRAVENOUS at 23:16

## 2019-12-13 RX ADMIN — Medication 10 ML: at 21:21

## 2019-12-13 RX ADMIN — MEROPENEM 500 MG: 500 INJECTION, POWDER, FOR SOLUTION INTRAVENOUS at 08:03

## 2019-12-13 RX ADMIN — OXYCODONE HYDROCHLORIDE 10 MG: 5 TABLET ORAL at 18:29

## 2019-12-13 RX ADMIN — SODIUM CHLORIDE 75 ML/HR: 900 INJECTION, SOLUTION INTRAVENOUS at 08:02

## 2019-12-13 RX ADMIN — HYDRALAZINE HYDROCHLORIDE 20 MG: 20 INJECTION INTRAMUSCULAR; INTRAVENOUS at 11:22

## 2019-12-13 RX ADMIN — POLYETHYLENE GLYCOL 3350 17 G: 17 POWDER, FOR SOLUTION ORAL at 11:22

## 2019-12-13 RX ADMIN — CYANOCOBALAMIN 1000 MCG: 1000 INJECTION, SOLUTION INTRAMUSCULAR; SUBCUTANEOUS at 08:03

## 2019-12-13 RX ADMIN — ALUMINUM HYDROXIDE, MAGNESIUM HYDROXIDE, AND DIMETHICONE 15 ML: 400; 400; 40 SUSPENSION ORAL at 21:20

## 2019-12-13 RX ADMIN — MEROPENEM 500 MG: 500 INJECTION, POWDER, FOR SOLUTION INTRAVENOUS at 01:26

## 2019-12-13 RX ADMIN — SODIUM CHLORIDE TAB 1 GM 1 G: 1 TAB at 21:20

## 2019-12-13 RX ADMIN — MICONAZOLE NITRATE: 20 POWDER TOPICAL at 21:20

## 2019-12-13 RX ADMIN — FAMOTIDINE 20 MG: 10 INJECTION, SOLUTION INTRAVENOUS at 08:03

## 2019-12-13 RX ADMIN — MICONAZOLE NITRATE: 20 POWDER TOPICAL at 16:31

## 2019-12-13 RX ADMIN — HEPARIN SODIUM 5000 UNITS: 5000 INJECTION INTRAVENOUS; SUBCUTANEOUS at 08:03

## 2019-12-13 RX ADMIN — DEMECLOCYCLINE HYDROCHLORIDE 300 MG: 150 TABLET, FILM COATED ORAL at 21:20

## 2019-12-13 RX ADMIN — HEPARIN SODIUM 5000 UNITS: 5000 INJECTION INTRAVENOUS; SUBCUTANEOUS at 21:21

## 2019-12-13 RX ADMIN — Medication 10 ML: at 08:15

## 2019-12-13 RX ADMIN — AMITRIPTYLINE HYDROCHLORIDE 100 MG: 50 TABLET, FILM COATED ORAL at 21:20

## 2019-12-13 RX ADMIN — MORPHINE SULFATE 4 MG: 4 INJECTION INTRAVENOUS at 21:21

## 2019-12-13 RX ADMIN — MORPHINE SULFATE 2 MG: 4 INJECTION INTRAVENOUS at 08:16

## 2019-12-13 RX ADMIN — HYDRALAZINE HYDROCHLORIDE 20 MG: 20 INJECTION INTRAMUSCULAR; INTRAVENOUS at 01:27

## 2019-12-13 RX ADMIN — DAKIN'S SOLUTION 0.125% (QUARTER STRENGTH): 0.12 SOLUTION at 08:17

## 2019-12-13 RX ADMIN — LEVETIRACETAM 500 MG: 5 INJECTION INTRAVENOUS at 08:02

## 2019-12-13 RX ADMIN — MEROPENEM 500 MG: 500 INJECTION, POWDER, FOR SOLUTION INTRAVENOUS at 15:23

## 2019-12-13 RX ADMIN — MORPHINE SULFATE 2 MG: 4 INJECTION INTRAVENOUS at 02:53

## 2019-12-13 RX ADMIN — LEVETIRACETAM 500 MG: 500 SOLUTION ORAL at 21:20

## 2019-12-13 NOTE — PROGRESS NOTES
Case Management Readmission Assessment Note       Case Management Readmission Assessment (all recorded)      Readmission Interview     Row Name 12/13/19 1512             Readmission Indications    Is this hospitalization related to the prior hospital diagnosis?  No      What was the reason you were admitted?  Bilateral PNA      Row Name 12/13/19 1512             Recommendation for rehospitalization    Did you speak with your physician prior to coming to the hospital  Yes      If yes, what physician did you speak with?  DR CARIAS      Who recommended you return to the hospital?  PCP      Did you seek care elsewhere prior to coming to the hospital?  No      Row Name 12/13/19 1512             Follow up appointment    Do you have a PCP?  Yes      Did you have an appointment with PCP/specialist after hospitalization within 7 days?  Yes      Did you keep your appointment?  No WASN'T HOME LONG ENOUGH TO GO TO APPOINTMENT       Row Name 12/13/19 1512             Medications    Did you have newly prescribed medications at discharge?  Yes      Did you understand the reasons for your medications at discharge and how to take them?  Yes      Did you understand the side effects of your medications?  Yes      Are you taking all of you prescribed medications?  Yes      Row Name 12/13/19 1512             Discharge Instructions    Did you understand your discharge instructions?  Yes      Did your family/caregiver hear your instructions?  Yes      Were you told to eat a special diet?  Yes      Did you adhere to the diet?  Yes      Were you given a number of someone to call if you had questions or concerns?  Yes      Row Name 12/13/19 1512             Index discharge location/services    Where did you go upon discharge?  [facility]       Row Name 12/13/19 1512             Discharge Readiness    On a scale of 1-5 (5 being well prepared), how ready were you for discharge  4

## 2019-12-13 NOTE — PROGRESS NOTES
LOS: 0 days   Patient Care Team:  Leonid Villeda Jr., MD as PCP - General  Leonid Villeda Jr., MD as PCP - Family Medicine    Chief Complaint: Weakness and shortness of breath    Subjective     Interval History:     Patient and family states that she feels better.  States that her weakness and shortness of breath is improved.  States that her confusion is better.  States that her cough and chest congestion is improved.    Review of Systems   Constitutional: Positive for fatigue. Negative for chills and fever.   HENT: Positive for congestion.    Respiratory: Positive for cough and shortness of breath.    Cardiovascular: Negative for chest pain and leg swelling.   Gastrointestinal: Positive for constipation. Negative for abdominal pain, diarrhea and nausea.   Skin: Negative for pallor and rash.         Objective     Vital Signs  Temp:  [95.3 °F (35.2 °C)-98.8 °F (37.1 °C)] 97.8 °F (36.6 °C)  Heart Rate:  [89-97] 94  Resp:  [15-20] 18  BP: (126-221)/() 143/71    Physical Exam   Constitutional:   Chronically ill-appearing, frail   Cardiovascular: Normal rate, regular rhythm, normal heart sounds and intact distal pulses.   Pulmonary/Chest: Effort normal and breath sounds normal.   Abdominal: Soft. Bowel sounds are normal.   Skin: Skin is warm and dry.   Nursing note and vitals reviewed.        Results Review:    Lab Results (last 24 hours)     Procedure Component Value Units Date/Time    CBC & Differential [[id number]] Collected:  12/13/19 0334    Specimen:  Blood Updated:  12/13/19 0521    Narrative:       The following orders were created for panel order CBC & Differential.  Procedure                               Abnormality         Status                     ---------                               -----------         ------                     Manual Differential[[id number]]                                                         CBC Auto Differential[[id number]]        Abnormal            Final result                  Please view results for these tests on the individual orders.    CBC Auto Differential [[id number]]  (Abnormal) Collected:  12/13/19 0334    Specimen:  Blood Updated:  12/13/19 0521     WBC 6.80 10*3/mm3      RBC 2.97 10*6/mm3      Hemoglobin 8.9 g/dL      Hematocrit 27.2 %      MCV 91.8 fL      MCH 30.1 pg      MCHC 32.8 g/dL      RDW 15.8 %      RDW-SD 51.6 fl      MPV 7.7 fL      Platelets 323 10*3/mm3      Neutrophil % 79.8 %      Lymphocyte % 9.6 %      Monocyte % 10.0 %      Eosinophil % 0.3 %      Basophil % 0.3 %      Neutrophils, Absolute 5.50 10*3/mm3      Lymphocytes, Absolute 0.70 10*3/mm3      Monocytes, Absolute 0.70 10*3/mm3      Eosinophils, Absolute 0.00 10*3/mm3      Basophils, Absolute 0.00 10*3/mm3     Comprehensive Metabolic Panel [[id number]]  (Abnormal) Collected:  12/13/19 0334    Specimen:  Blood Updated:  12/13/19 0513     Glucose 89 mg/dL      BUN 23 mg/dL      Creatinine 0.69 mg/dL      Sodium 136 mmol/L      Potassium 3.7 mmol/L      Chloride 96 mmol/L      CO2 26.0 mmol/L      Calcium 8.6 mg/dL      Total Protein 7.1 g/dL      Albumin 3.20 g/dL      ALT (SGPT) 8 U/L      AST (SGOT) 14 U/L      Alkaline Phosphatase 85 U/L      Total Bilirubin 0.4 mg/dL      eGFR Non African Amer 87 mL/min/1.73      Globulin 3.9 gm/dL      A/G Ratio 0.8 g/dL      BUN/Creatinine Ratio 33.3     Anion Gap 14.0 mmol/L     Narrative:       GFR Normal >60  Chronic Kidney Disease <60  Kidney Failure <15      Respiratory Panel, PCR - Swab, Nasopharynx [[id number]]  (Abnormal) Collected:  12/12/19 1204    Specimen:  Swab from Nasopharynx Updated:  12/12/19 1323     ADENOVIRUS, PCR Not Detected     Coronavirus 229E Not Detected     Coronavirus HKU1 Not Detected     Coronavirus NL63 Not Detected     Coronavirus OC43 Not Detected     Human Metapneumovirus Not Detected     Human Rhinovirus/Enterovirus Detected     Influenza B PCR Not Detected     Parainfluenza Virus 1 Not Detected     Parainfluenza Virus 2  Not Detected     Parainfluenza Virus 3 Not Detected     Parainfluenza Virus 4 Not Detected     Bordetella pertussis pcr Not Detected     Influenza A H1 2009 PCR Not Detected     Chlamydophila pneumoniae PCR Not Detected     Mycoplasma pneumo by PCR Not Detected     Influenza A PCR Not Detected     Influenza A H3 Not Detected     Influenza A H1 Not Detected     RSV, PCR Not Detected    Basic Metabolic Panel [[id number]]  (Abnormal) Collected:  12/12/19 1157    Specimen:  Blood Updated:  12/12/19 1236     Glucose 115 mg/dL      BUN 36 mg/dL      Creatinine 0.74 mg/dL      Sodium 134 mmol/L      Potassium 3.9 mmol/L      Chloride 95 mmol/L      CO2 26.0 mmol/L      Calcium 8.7 mg/dL      eGFR Non African Amer 80 mL/min/1.73      BUN/Creatinine Ratio 48.6     Anion Gap 13.0 mmol/L     Narrative:       GFR Normal >60  Chronic Kidney Disease <60  Kidney Failure <15      Blood Culture - Blood, Arm, Right [[id number]] Collected:  12/12/19 1212    Specimen:  Blood from Arm, Right Updated:  12/12/19 1219    CBC & Differential [[id number]] Collected:  12/12/19 1157    Specimen:  Blood Updated:  12/12/19 1207    Narrative:       The following orders were created for panel order CBC & Differential.  Procedure                               Abnormality         Status                     ---------                               -----------         ------                     CBC Auto Differential[[id number]]        Abnormal            Final result                 Please view results for these tests on the individual orders.    CBC Auto Differential [[id number]]  (Abnormal) Collected:  12/12/19 1157    Specimen:  Blood Updated:  12/12/19 1207     WBC 8.10 10*3/mm3      RBC 2.78 10*6/mm3      Hemoglobin 8.1 g/dL      Hematocrit 25.5 %      MCV 91.5 fL      MCH 29.1 pg      MCHC 31.8 g/dL      RDW 15.9 %      RDW-SD 51.6 fl      MPV 7.3 fL      Platelets 293 10*3/mm3      Neutrophil % 86.2 %      Lymphocyte % 5.7 %      Monocyte % 7.8 %       Eosinophil % 0.2 %      Basophil % 0.1 %      Neutrophils, Absolute 7.00 10*3/mm3      Lymphocytes, Absolute 0.50 10*3/mm3      Monocytes, Absolute 0.60 10*3/mm3      Eosinophils, Absolute 0.00 10*3/mm3      Basophils, Absolute 0.00 10*3/mm3      nRBC 0.1 /100 WBC     Blood Culture - Blood, Blood, Venous Line [[id number]] Collected:  12/12/19 1157    Specimen:  Blood, Venous Line Updated:  12/12/19 1203         Imaging Results (Last 24 Hours)     Procedure Component Value Units Date/Time    CT Abdomen Pelvis Without Contrast [[id number]] Collected:  12/12/19 1452     Updated:  12/12/19 1459    Narrative:       CT ABDOMEN PELVIS WO CONTRAST-     Date of Exam: 12/12/2019 2:45 PM     Indication: Abdominal pain, unspecified  . Constipation.     Comparison: None available.     Technique: Contiguous axial CT images were obtained from the lung bases  to the pubic symphysis without contrast. Sagittal and coronal  reconstructions were performed.  Automated exposure control and  iterative reconstruction methods were used.     FINDINGS:     Large colonic stool burden greatest in the cecum and ascending colon no  pneumatosis or free air or abscess.     Percutaneous gastrostomy tube in place.     No abnormally dilated small bowel loops are identified. Noncontrast  appearance of the liver, gallbladder, spleen, adrenals and kidneys is  within normal limits. Pancreas is moderately atrophic.     Dense airspace disease changes are present in the right middle lobe and  right lower lobe. Noncalcified nodular densities are present in the left  lower lobe measuring 4 mm (image 6) and 7 mm (image 7), with mild left  basilar atelectasis. There is moderate cardiomegaly.     Old bilateral pubic body and right inferior pubic rami fractures. ORIF  changes of the right femur. Chronic appearing bilateral sacral ala  fractures. Chronic appearing fracture of L5 vertebral body with  compression. Grade 1-2 anterolisthesis L4 upon L5, approximately 13  mm,  thought to be related to severe facet arthropathy.          Impression:          1. Large colonic stool burden, most severe within the cecum and  ascending colon, consistent with the appearance of constipation.  2. No obstructing bowel abnormality is seen, and there is no evidence of  active bowel inflammation.  3. Dense right middle lobe and right lower lobe airspace disease.  Correlate clinically for pneumonia.  4. Small nodular densities in the left lower lobe are nonspecific but  favored to reflect benign infectious/inflammatory nodules.  5. Chronic appearing fractures of the pelvis and L5 vertebral body.  Right femur ORIF  6. Grade 1-2 anterolisthesis L5 upon S1 thought to be related to severe  facet arthropathy.           Electronically Signed By-Dr. Ashley Jerez MD On:12/12/2019 2:57 PM  This report was finalized on [id number] by Dr. Ashley Jerez MD.    XR Chest 2 View [[id number]] Collected:  12/12/19 1114     Updated:  12/12/19 1120    Narrative:       DATE OF EXAM:  12/12/2019 10:48 AM     PROCEDURE:  XR CHEST 2 VW-     INDICATIONS:  cough       COMPARISON:  CT chest 11/26/2019. AP portable chest 11/25/2019.     TECHNIQUE:   Two radiologic views of the chest.     FINDINGS:  Right lower lobe airspace disease persists, and appears fairly similar  to the 11/25/2019 examination. There is new ill-defined airspace disease  in the left lower lobe.     Stable cardiac enlargement. Left chest wall luis catheter remains at  the cavoatrial junction. No visible pneumothorax. Surgical changes of  the right humerus. Degenerative changes of the left humerus. Chronic  appearing deformity of the distal left clavicle. Diffuse osteopenia.     There is a chronic appearing severe compression fracture of the T5  vertebral body, chronic appearing compression fracture of the superior  endplate of T4, corresponding to the CT chest from 11/26/2019. There is,  however, an age-indeterminate compression fracture of T8, new  since  11/26/2019 CT chest.       Impression:          1. Persistent right lower lobe airspace disease worrisome for pneumonia.  New left lower lobe airspace disease worrisome for developing pneumonia.  2. Age indeterminate compression fracture of T8, new since 11/26/2019  comparison. Chronic T4 and T5 compression fractures.     Electronically Signed By-Dr. Ashley Jerez MD On:12/12/2019 11:18 AM  This report was finalized on [id number] by Dr. Ashley Jerez MD.    XR Spine Lumbar Complete 4+VW [[id number]] Collected:  12/12/19 1114     Updated:  12/12/19 1118    Narrative:       XR SPINE LUMBAR COMPLETE 4+VW-     Date of Exam: 12/12/2019 10:22 AM     Indication: Low back pain after fall.     Comparison Exams: September 6, 2017     Technique: 5 radiographs of the lumbar spine     FINDINGS:  No acute fracture is identified. There is generalized osteopenia. The  alignment appears stable, with grade 1/2 anterolisthesis of L4 on L5.  The vertebral body heights appear normal. There is moderate degenerative  loss of disc height at L4-5 and L5-S1. There is mild to moderate facet  arthropathy at L3-4 through L5-S1. The soft tissues are unremarkable.       Impression:       1.No acute fracture or traumatic subluxation of lumbar spine identified.  2.Degenerative changes as described above.     Electronically Signed By-DR. Jevon Dominguez MD On:12/12/2019 11:16 AM  This report was finalized on [id number] by DR. Jevon Dominguez MD.         ECG/EMG Results (last 24 hours)     ** No results found for the last 24 hours. **           I reviewed the patient's new clinical results.    Medication Review: I have reviewed the medications    Current Facility-Administered Medications:   •  albuterol sulfate HFA (PROVENTIL HFA;VENTOLIN HFA;PROAIR HFA) inhaler 2 puff, 2 puff, Inhalation, Q4H PRN, Leonid Villeda Jr., MD  •  aluminum-magnesium hydroxide-simethicone (MAALOX MAX) 400-400-40 MG/5ML suspension 15 mL, 15 mL, Oral,  BID, Leonid Villeda Jr., MD, 15 mL at 12/13/19 0803  •  amitriptyline (ELAVIL) tablet 100 mg, 100 mg, Oral, Nightly, Leonid Villeda Jr., MD  •  calcium carbonate (TUMS) chewable tablet 500 mg (200 mg elemental), 2 tablet, Oral, BID PRN, Leonid Villeda Jr., MD  •  carvedilol (COREG) tablet 12.5 mg, 12.5 mg, Oral, BID With Meals, Leonid Villeda Jr., MD  •  cloNIDine (CATAPRES-TTS) 0.3 MG/24HR patch 1 patch, 1 patch, Transdermal, Weekly, John Canales MD, 1 patch at 12/12/19 1923  •  clopidogrel (PLAVIX) tablet 75 mg, 75 mg, Oral, Daily, Leonid Villeda Jr., MD  •  cyanocobalamin injection 1,000 mcg, 1,000 mcg, Intramuscular, Q28 Days, Leonid Villeda Jr., MD, 1,000 mcg at 12/13/19 0803  •  demeclocycline (DECLOMYCIN) tablet 300 mg, 300 mg, Oral, Q12H, Leonid Villeda Jr., MD  •  famotidine (PEPCID) injection 20 mg, 20 mg, Intravenous, Daily, Leonid Villeda Jr., MD, 20 mg at 12/13/19 0803  •  heparin (porcine) 5000 UNIT/ML injection 5,000 Units, 5,000 Units, Subcutaneous, Q12H, Leonid Villeda Jr., MD, 5,000 Units at 12/13/19 0803  •  hydrALAZINE (APRESOLINE) injection 20 mg, 20 mg, Intravenous, Q6H, John Canales MD, 20 mg at 12/13/19 0127  •  ipratropium-albuterol (DUO-NEB) nebulizer solution 3 mL, 3 mL, Nebulization, Q4H PRN, Leonid Villeda Jr., MD  •  labetalol (NORMODYNE,TRANDATE) injection 10 mg, 10 mg, Intravenous, Q6H PRN, Leonid Villeda Jr., MD  •  levETIRAcetam in NaCl 0.82% (KEPPRA) IVPB 500 mg, 500 mg, Intravenous, Q12H, Leonid Villeda Jr., MD, Last Rate: 0 mL/hr at 12/12/19 2113, 500 mg at 12/13/19 0802  •  magnesium hydroxide (MILK OF MAGNESIA) suspension 2400 mg/10mL 10 mL, 10 mL, Oral, Daily PRN, Leonid Villeda Jr., MD  •  meropenem (MERREM) 500 mg in sodium chloride 0.9 % 100 mL IVPB, 500 mg, Intravenous, Q8H, Leonid Villeda Jr., MD, Last Rate: 0 mL/hr at 12/12/19 1830, 500 mg at 12/13/19 0803  •  miconazole (MICOTIN) 2 % powder, , Topical, Q12H, Brenden  JESSICA Robison  •  Morphine sulfate (PF) injection 2 mg, 2 mg, Intravenous, Q4H PRN, 2 mg at 12/13/19 0816 **OR** Morphine sulfate (PF) injection 4 mg, 4 mg, Intravenous, Q4H PRN, Leonid Villeda Jr., MD  •  ondansetron (ZOFRAN) injection 4 mg, 4 mg, Intravenous, Q6H PRN, Leonid Villeda Jr., MD  •  oxyCODONE (ROXICODONE) immediate release tablet 10 mg, 10 mg, Oral, 4x Daily PRN, Leonid Villeda Jr., MD  •  [COMPLETED] Insert peripheral IV, , , Once **AND** sodium chloride 0.9 % flush 10 mL, 10 mL, Intravenous, PRN, Leonid Villeda Jr., MD  •  sodium chloride 0.9 % flush 10 mL, 10 mL, Intravenous, Q12H, Leonid Villeda Jr., MD, 10 mL at 12/13/19 0815  •  sodium chloride 0.9 % flush 10 mL, 10 mL, Intravenous, PRN, Leonid Villeda Jr., MD  •  sodium chloride 0.9 % infusion, 75 mL/hr, Intravenous, Continuous, Leonid Villeda Jr., MD, Last Rate: 75 mL/hr at 12/13/19 0802, 75 mL/hr at 12/13/19 0802  •  sodium chloride tablet 1 g, 1 g, Oral, BID, Leonid Villeda Jr., MD  •  sodium hypochlorite (DAKIN'S) 0.125 % topical solution 0.125% (quarter strength), , Topical, Q12H, Leonid Villeda Jr., MD       Principal Problem:    Pneumonia due to infectious evrjrmrb-[id number] are negative so far.  Patient did test positive for rhinovirus.  We will continue with empiric treatment for pneumonia with antibiotics.    Active Problems:    Dysphagia, pharyngoesophageal phase-stable-patient had PEG tube replaced.  Will start tube feeds      Decubitus ulcer of ischial area, right, stage IV (CMS/HCC)-stable and patient is being by wound therapy.      Closed displaced fracture of acromial end of left clavicle stable       Disposition-patient and family are agreeable to going to skilled nursing facility on discharge.       Constipation- will start cathartics       Continue current treatment.  Await results of tests.  Resume G-tube feedings.        Assessment & Plan     Plan for disposition:Where: [facility]  Jr. Villeda MD  12/13/19  10:04 AM

## 2019-12-13 NOTE — NURSING NOTE
Seen by wound care for stage 4 pressure injury s/p surgical debridment approx.  Month ago by Dr. Zuñiga.  Pt has appt sched. In wound healing center sched. For this Monday.  She has been receiving dressing changes ( dakins once daily ) at home.      Right sacral/buttocks stage 4 pressure injury. Measures 6x5.5x0.5 with underminning from 10-2 oclock approx 1.5cm.  There is a small amount of adherent slough in wound bed 15%.  Otherwise pink,moist, non granulating. Small- moderate amount of green/yellow thick drainage noted. Some non blanchable erythema and eccymosis noted to periwound otherwise clear and intact.  Rec cont dakins moist fluffed gauze bid cover with silicone foam dressing.       Also noted pt has a stage 1 pressure injury on bilat hips. rec preventative silicone foam dressings.      Rec const skin at risk protocols including turn q 2, keep turned off of right sacral wound.  Waffle cushion when oob, immersion surface. Keep heels floated.    Follow up in wound healing center as outpatient.

## 2019-12-13 NOTE — THERAPY EVALUATION
Patient Name: Sri Bobo  : 1960    MRN: [id number]                              Today's Date: 2019       Admit Date: 2019    Visit Dx:     ICD-10-CM ICD-9-CM   1. Pneumonia of both lower lobes due to infectious organism (CMS/HCC) J18.1 483.8   2. Attention to gastrostomy tube (CMS/HCC) Z43.1 V55.1   3. Constipation, unspecified constipation type K59.00 564.00   4. Rhinovirus B34.8 079.3   5. Closed fracture of eighth thoracic vertebra, unspecified fracture morphology, initial encounter (CMS/HCC) S22.069A 805.2   6. Fever, unspecified fever cause R50.9 780.60     Patient Active Problem List   Diagnosis   • Anemia, B12 deficiency   • Dysphagia, pharyngoesophageal phase   • Malignant neoplasm of tonsillar fossa (CMS/[facility])   • Osteoporosis   • Spinal stenosis, lumbar   • Erosive esophagitis   • Polyarthralgia   • Peripheral vascular disease (CMS/[facility])   • Stenosis of right carotid artery   • Anxiety   • Benign essential hypertension   • Anemia   • Pneumonia due to infectious organism   • Decubitus ulcer of ischial area, right, stage IV (CMS/[facility])   • Closed displaced fracture of acromial end of left clavicle     Past Medical History:   Diagnosis Date   • Anemia    • Broken hip (CMS/[facility]) 2019    rt hip broken   • Cancer (CMS/[facility]) 2013    Throat   • Cerebral hemorrhage (CMS/[facility])    • Dislocated shoulder 2017    dislocated left shoulder   • Fall 2018    rt shoulder and elbow injured with fall, was inpt at [facility] ( to start PT in Dec 2018)   • Gastrostomy tube in place (CMS/[facility])    • GERD (gastroesophageal reflux disease)    •  1 para 1     [id number]   • Hip pain     lt   • History of right common carotid artery stent placement    • Hypertension    • Pneumonia 2018   • Renal failure, chronic, stage 3 (moderate) (CMS/[facility])    • Seizures (CMS/[facility])    • Shingles 2017   • Vitamin B 12 deficiency      Past Surgical History:   Procedure Laterality Date   • BUNIONECTOMY     • CAROTID STENT  Right    • DEBRIDEMENT OF ISCHEAL ULCER/BUTTOCKS WOUND Right 11/26/2019    Procedure: DEBRIDEMENT OF ISCHEAL ULCER/BUTTOCKS WOUND;  Surgeon: Melanie Zuñiga MD;  Location: [facility] MAIN OR;  Service: General   • ESOPHAGEAL DILATATION      botox injection   • GTUBE INSERTION     • HIP SURGERY Right 03/2019    rt hip broken   • ORIF ULNA/RADIUS FRACTURES Right     ORIF, right distal radius fx 08/10/2018 metal plate in right shoulder   • WRIST FRACTURE SURGERY Left 2012     General Information     Row Name 12/13/19 1454          PT Evaluation Time/Intention    Document Type  evaluation  -CR     Mode of Treatment  physical therapy  -CR     Row Name 12/13/19 1455          General Information    Patient Profile Reviewed?  yes  -CR     Prior Level of Function  min assist:;all household mobility  -CR     Barriers to Rehab  medically complex  -CR     Row Name 12/13/19 1452          Relationship/Environment    Lives With  -- spouse is a paraplegic but very independent  -CR     Row Name 12/13/19 1454          Home Main Entrance    Number of Stairs, Main Entrance  none  -CR     Row Name 12/13/19 1450          Stairs Within Home, Primary    Number of Stairs, Within Home, Primary  none  -CR     Row Name 12/13/19 145          Cognitive Assessment/Intervention- PT/OT    Orientation Status (Cognition)  oriented x 3  -CR     Row Name 12/13/19 1456          Safety Issues, Functional Mobility    Impairments Affecting Function (Mobility)  endurance/activity tolerance;pain;strength  -CR       User Key  (r) = Recorded By, (t) = Taken By, (c) = Cosigned By    Initials Name Provider Type    CR Reyes, Carmela, PT Physical Therapist        Mobility     Row Name 12/13/19 1453          Bed Mobility Assessment/Treatment    Bed Mobility Assessment/Treatment  supine-sit;sit-supine  -CR     Supine-Sit [place] (Bed Mobility)  conditional independence  -CR     Sit-Supine [place] (Bed Mobility)  conditional independence  -CR     Row Name  12/13/19 1455          Sit-Stand Transfer    Sit-Stand [place] (Transfers)  minimum assist (75% patient effort)  -CR     Row Name 12/13/19 1455          Gait/Stairs Assessment/Training    Gait/Stairs Assessment/Training  gait/ambulation independence  -CR     [place] Level (Gait)  minimum assist (75% patient effort)  -CR     Distance in Feet (Gait)  20 x 2  -CR     Deviations/Abnormal Patterns (Gait)  gait speed decreased;festinating/shuffling  -CR       User Key  (r) = Recorded By, (t) = Taken By, (c) = Cosigned By    Initials Name Provider Type    CR Reyes, Carmela, PT Physical Therapist        Obj/Interventions     Row Name 12/13/19 1456          General ROM    GENERAL ROM COMMENTS  active ROM BLE wfl  -CR     Row Name 12/13/19 1456          MMT (Manual Muscle Testing)    General MMT Comments  grossly 3+/5  -CR     Row Name 12/13/19 1456          Static Sitting Balance    Level of [place] (Unsupported Sitting, Static Balance)  supervision  -CR     Sitting Position (Unsupported Sitting, Static Balance)  sitting on edge of bed  -CR     Time Able to Maintain Position (Unsupported Sitting, Static Balance)  less than 15 seconds  -CR     Row Name 12/13/19 1456          Static Standing Balance    Level of [place] (Supported Standing, Static Balance)  minimal assist, 75% patient effort  -CR     Time Able to Maintain Position (Supported Standing, Static Balance)  30 to 45 seconds  -CR     Assistive Device Utilized (Supported Standing, Static Balance)  -- HHA  -CR     Row Name 12/13/19 1456          Dynamic Standing Balance    Level of [place], Reaches Outside Midline (Standing, Dynamic Balance)  minimal assist, 75% patient effort  -CR     Time Able to Maintain Position, Reaches Outside Midline (Standing, Dynamic Balance)  15 to 30 seconds  -CR       User Key  (r) = Recorded By, (t) = Taken By, (c) = Cosigned By    Initials Name Provider Type    CR Reyes, Carmela, PT Physical Therapist         Goals/Plan     Row Name 12/13/19 1453          Transfer Goal 1 (PT)    Activity/Assistive Device (Transfer Goal 1, PT)  transfers, all;walker, rolling  -CR     [place] Level/Cues Needed (Transfer Goal 1, PT)  conditional independence  -CR     Row Name 12/13/19 1457          Gait Training Goal 1 (PT)    Activity/Assistive Device (Gait Training Goal 1, PT)  assistive device use;gait (walking locomotion);increase endurance/gait distance;decrease fall risk  -CR     [place] Level (Gait Training Goal 1, PT)  contact guard assist  -CR     Distance (Gait Goal 1, PT)  50 x 2  -CR       User Key  (r) = Recorded By, (t) = Taken By, (c) = Cosigned By    Initials Name Provider Type    CR Reyes, Carmela, PT Physical Therapist        Clinical Impression     Row Name 12/13/19 1454          Pain Assessment    Additional Documentation  Pain Scale: Numbers Pre/Post-Treatment (Group)  -CR     Row Name 12/13/19 1459          Pain Scale: Numbers Pre/Post-Treatment    Pain Scale: Numbers, Pretreatment  4/10  -CR     Pain Scale: Numbers, Post-Treatment  4/10  -CR     Pain Location  back  -CR     Pain Intervention(s)  Repositioned  -CR     Row Name 12/13/19 0381          Plan of Care Review    Plan of Care Reviewed With  patient  -CR     Row Name 12/13/19 1458          Physical Therapy Clinical Impression    Criteria for Skilled Interventions Met (PT Clinical Impression)  yes;treatment indicated  -CR     Rehab Potential (PT Clinical Summary)  fair, will monitor progress closely  -CR     Predicted Duration of Therapy (PT)  d/c  -CR     Row Name 12/13/19 1452          Positioning and Restraints    Pre-Treatment Position  in bed  -CR     Post Treatment Position  bed  -CR     In Bed  notified nsg;supine;call light within reach  -CR       User Key  (r) = Recorded By, (t) = Taken By, (c) = Cosigned By    Initials Name Provider Type    CR Reyes, Carmela, PT Physical Therapist        Outcome Measures     Row Name 12/13/19 7766           How much help from another person do you currently need...    Turning from your back to your side while in flat bed without using bedrails?  3  -CR     Moving from lying on back to sitting on the side of a flat bed without bedrails?  3  -CR     Moving to and from a bed to a chair (including a wheelchair)?  3  -CR     Standing up from a chair using your arms (e.g., wheelchair, bedside chair)?  3  -CR     Climbing 3-5 steps with a railing?  1  -CR     To walk in hospital room?  2  -CR     AM-PAC 6 Clicks Score (PT)  15  -CR     Row Name 12/13/19 1459          Functional Assessment    Outcome Measure Options  AM-PAC 6 Clicks Basic Mobility (PT)  -CR       User Key  (r) = Recorded By, (t) = Taken By, (c) = Cosigned By    Initials Name Provider Type    CR Reyes, Carmela, PT Physical Therapist          PT Recommendation and Plan  Planned Therapy Interventions (PT Eval): balance training, gait training, strengthening, transfer training  Outcome Summary/Treatment Plan (PT)  Anticipated Discharge Disposition (PT): inpatient rehabilitation facility  Plan of Care Reviewed With: patient     Time Calculation:   PT Charges     Row Name 12/13/19 1521             Time Calculation    Start Time  1333  -CR      Stop Time  1345  -CR      Time Calculation (min)  12 min  -CR      PT Received On  12/13/19  -CR      PT - Next Appointment  12/15/19  -CR      PT Goal Re-Cert Due Date  12/27/19  -CR         Time Calculation- PT    Total Timed Code Minutes- PT  0 minute(s)  -CR        User Key  (r) = Recorded By, (t) = Taken By, (c) = Cosigned By    Initials Name Provider Type    CR Reyes, Carmela, PT Physical Therapist        Therapy Charges for Today     Code Description Service Date Service Provider Modifiers Qty    57330691662 HC PT EVAL HIGH COMPLEXITY 3 12/13/2019 Reyes, Carmela, PT GP 1          PT G-Codes  Outcome Measure Options: AM-PAC 6 Clicks Basic Mobility (PT)  AM-PAC 6 Clicks Score (PT): 15    Carmela Reyes,  PT  12/13/2019

## 2019-12-13 NOTE — PLAN OF CARE
Pt A&O x4 today. Secretions have improved as well. Gtube changed by GI and ready to use. Will continue to monitor

## 2019-12-13 NOTE — PROGRESS NOTES
Continued Stay Note   [facility]     Patient Name: Sri Bobo  MRN: [id number]  Today's Date: 12/13/2019    Admit Date: 12/12/2019    Discharge Plan     Row Name 12/13/19 1808       Plan    Plan  DC Plan: [facility] - referral pending. PASRR approved (not needed for [facility]). Will require precert. 3rd: [facility]    Plan Comments  CM obtained rehab choices from pt/family as follows: 1) [facility], 2) [facility], 3) [facility]. SW made referral to [facility], but denied d/t no accomodation at this time. Made referral to [facility], pending response.      COY Pisano    Phone: [phone]  Cell: [phone]  Fax: [phone]  [email or web address]

## 2019-12-13 NOTE — OP NOTE
Procedure Report    Patient Name:  Sri Bobo  YOB: 1960    Date of Surgery:  12/13/2019     Pre-Op Diagnosis:  Dislodged G-tube    Complications:  None    Description of Procedure:  At the bedside, the cole catheter's internal balloon was deflated and the tube was  gently removed using traction.  A new 22 [place]uv[place] gastrostomy tube was lubricated, placed into the gastrostomy site and the internal balloon was inflated to 10cc with saline.  The tube flushed freely. Bumper set to 4. Patient tolerated the procedure well. Some redness/excoriation noted to skin around G-tube site.     Recommendations:  1. Ok to use G-tube for tube feeds/medications immediately.   2. Have patient follow-up in office in 6 months for next G-tube change.   3. Start antifungal powder BID at tube site for excoriation.   4. Call GI for any issues. We will see PRN.       JESSICA Martinez     Date: 12/13/2019    Time: 9:33 AM

## 2019-12-13 NOTE — PLAN OF CARE
Problem: Patient Care Overview  Goal: Plan of Care Review  Outcome: Ongoing (interventions implemented as appropriate)  Flowsheets (Taken 12/13/2019 0418)  Progress: improving  Plan of Care Reviewed With: patient  Note:   Patient transferred from [facility] to PCU on 12/12/19. Patient here for  pneumonia. Patient has altered mental status and is alert and oriented to person and place at this time. Continues on oxygen at 2L via nasal cannual to maintain O2 sat. At times patient has been on room air with O2 sat 90's. Patient is currently on IV antibiotics. Patient pulled gtube out at home and cole was inserted to maintain stoma. New consult for GI and Dr. Smith aware and plans to change G tube am of 12/13/19. Patient also has consults with Nephrology and Dr. Rob. Patient is a Q 2 turn due to an unstageable on right hip.

## 2019-12-13 NOTE — OUTREACH NOTE
COPD/PN Week 3 Survey      Responses   Facility patient discharged from?  [facility]   Does the patient have one of the following disease processes/diagnoses(primary or secondary)?  COPD/Pneumonia   Was the primary reason for admission:  Pneumonia   Week 3 attempt successful?  No   Revoke  Readmitted          Vicky Fulton RN

## 2019-12-13 NOTE — DISCHARGE PLACEMENT REQUEST
"Salazar Bobo (59 y.o. Female)     Date of Birth Social Security Number Address Home Phone MRN    1960  [address] [address] [phone] [id number]    Alevism Marital Status          Hindu        Admission Date Admission Type Admitting Provider Attending Provider Department, Room/Bed    12/12/19 Emergency Leonid Villeda Jr., MD Hocker, Clifton M Jr., MD [facility], 2107/1    Discharge Date Discharge Disposition Discharge Destination                       Attending Provider:  Leonid Villeda Jr., MD    Allergies:  Codeine, Sulfa Antibiotics    Isolation:  Contact, Droplet   Infection:  ESBL E coli (10/18/18), Rhinovirus  (12/12/19)   Code Status:  CPR    Ht:  157.5 cm (62\")   Wt:  39.7 kg (87 lb 8.4 oz)    Admission Cmt:  None   Principal Problem:  Pneumonia due to infectious organism [J18.9]                 Active Insurance as of 12/12/2019     Primary Coverage     Payor Plan Insurance Group Employer/Plan Group    ANTHEM MEDICARE REPLACEMENT ANTHEM MEDICARE ADVANTAGE [id number]     Payor Plan Address Payor Plan Phone Number Payor Plan Fax Number Effective Dates    [id number] [phone]  1/1/2019 - None Entered    [facility] [id number]       Subscriber Name Subscriber Birth Date Member ID       SALAZAR BOBO 1960 [id number]                 Emergency Contacts      (Rel.) Home Phone Work Phone Mobile Phone    renatabailey (Daughter) [phone] -- [phone]    JENNIFER CRANDALL (Spouse) [phone] -- --              "

## 2019-12-13 NOTE — PROGRESS NOTES
Discharge Planning Assessment  [facility]     Patient Name: Sri Bobo  MRN: [id number]  Today's Date: 12/13/2019    Admit Date: 12/12/2019    Discharge Needs Assessment     Row Name 12/13/19 1048       Living Environment    Lives With  spouse    Current Living Arrangements  home/apartment/condo    Primary Care Provided by  spouse/significant other;child(yfn)    Quality of Family Relationships  supportive    Able to Return to Prior Arrangements  yes       Transition Planning    Patient/Family Anticipates Transition to  inpatient rehabilitation facility    Transportation Anticipated  family or friend will provide       Discharge Needs Assessment    Anticipated Changes Related to Illness  inability to care for self    Provided post acute provider list?  Yes    Post Acuite Provider List  Delivered    Delivered To  Support Person    Support Person  bailey pt's daughter     Discharge Coordination/Progress  d/c plan will be rehab         Discharge Plan     Row Name 12/13/19 1051       Plan    Plan  D/C Plan will be acute rehab with 1st choice [facility] , 2nd choice [facility] and 3rd choice [facility].     Patient/Family in Agreement with Plan  yes    Plan Comments  Pt is in with bilateral PNA  and confusion . PT/ OT to eval.         Destination      Coordination has not been started for this encounter.      Durable Medical Equipment      Coordination has not been started for this encounter.      Dialysis/Infusion      Coordination has not been started for this encounter.      Home Medical Care      Coordination has not been started for this encounter.      Therapy      Coordination has not been started for this encounter.      Community Resources      Coordination has not been started for this encounter.          Demographic Summary    No documentation.       Functional Status    No documentation.       Psychosocial    No documentation.       Abuse/Neglect    No documentation.       Legal    No documentation.       Substance  Abuse    No documentation.       Patient Forms    No documentation.           Sugar Moreno RN

## 2019-12-13 NOTE — PLAN OF CARE
Problem: Patient Care Overview  Goal: Plan of Care Review  Outcome: Ongoing (interventions implemented as appropriate)  Flowsheets (Taken 12/13/2019 1504 by Ashley Cyr, RN)  Plan of Care Reviewed With: patient  Note:   58 y/o female with hx of tonsillar Ca admitted on 12/12 due to accidental removal of g tube and inc weakness with change in mental status. Pt found to have +rhinovirus. Pt was recently d/c from another hospital due to pna, L clavicle fx and pressure sore on sacrum. PMH includes dysphagia. At time of eval, pt requires min A for all transfers. Pt able to ambulate with HHA for 20 ft x 2 ; poor activity tolerance. Pt needs short IP rehab for strength training. Pt will be seen 3x/wk while in hospital.

## 2019-12-13 NOTE — CONSULTS
NEPHROLOGY CONSULTATION-----[facility]    [facility]  [phone]  John Canales MD    Patient Care Team:  Leonid Villeda Jr., MD as PCP - General  Leonid Villeda Jr., MD as PCP - Family Medicine  John Canales MD as Consulting Physician (Nephrology)    CC/REASON FOR CONSULTATION: Hypertension  Requesting Physician: Dr Villeda    History of Present Illness   59 year old female with PMHx HTN, tonsillar cancer admitted with weakness and confusion. Her BP was high at 220/103. Plan was to start her on cardene drip. Her G-tube came out and was not able to get her meds. We were asked to assist with BP management. Denies vomiting or diarrhea. No chest pain or SOA. She was recently in hospital with pneumonia.    Review of Systems   As noted above    Past Medical History:   Diagnosis Date   • Anemia    • Broken hip (CMS/HCC) 2019    rt hip broken   • Cancer (CMS/HCC) 2013    Throat   • Cerebral hemorrhage (CMS/[facility])    • Dislocated shoulder 2017    dislocated left shoulder   • Fall 2018    rt shoulder and elbow injured with fall, was inpt at [facility] ( to start PT in Dec 2018)   • Gastrostomy tube in place (CMS/[facility])    • GERD (gastroesophageal reflux disease)    •  1 para 1     [id number]   • Hip pain     lt   • History of right common carotid artery stent placement    • Hypertension    • Pneumonia 2018   • Renal failure, chronic, stage 3 (moderate) (CMS/HCC)    • Seizures (CMS/[facility])    • Shingles 2017   • Vitamin B 12 deficiency        Past Surgical History:   Procedure Laterality Date   • BUNIONECTOMY     • CAROTID STENT Right    • DEBRIDEMENT OF ISCHEAL ULCER/BUTTOCKS WOUND Right 2019    Procedure: DEBRIDEMENT OF ISCHEAL ULCER/BUTTOCKS WOUND;  Surgeon: Melanie Zuñiga MD;  Location: [facility] MAIN OR;  Service: General   • ESOPHAGEAL DILATATION      botox injection   • GTUBE INSERTION     • HIP SURGERY Right 2019    rt hip broken   • ORIF ULNA/RADIUS  FRACTURES Right     ORIF, right distal radius fx 08/10/2018 metal plate in right shoulder   • WRIST FRACTURE SURGERY Left 2012       Family History   Problem Relation Age of Onset   • Cancer Mother         lymphoma   • Stroke Father    • Diabetes Other    • Heart disease Other    • Hypertension Other        Social History     Tobacco Use   • Smoking status: Former Smoker   • Smokeless tobacco: Never Used   • Tobacco comment: 5 years stopped smoking   Substance Use Topics   • Alcohol use: No     Frequency: Never   • Drug use: No       Home Meds:   Medications Prior to Admission   Medication Sig Dispense Refill Last Dose   • albuterol sulfate  (90 Base) MCG/ACT inhaler Inhale 2 puffs Every 4 (Four) Hours As Needed for Wheezing. 1 inhaler 5 Taking   • amitriptyline (ELAVIL) 50 MG tablet Take 100 mg by mouth Every Night.   11/24/2019 at Unknown time   • amoxicillin (AMOXIL) 250 MG/5ML suspension Take 15 mL by mouth 2 (Two) Times a Day. (Patient taking differently: Take 750 mg by mouth 2 (Two) Times a Day. Filled 12/03/19 10 day supply) 300 mL 0    • calcium-vitamin D (OSCAL-500) 500-200 MG-UNIT per tablet Take 1 tablet by mouth 2 (Two) Times a Day.      • carvedilol (COREG) 12.5 MG tablet Take 12.5 mg by mouth 2 (Two) Times a Day With Meals.   11/25/2019 at Unknown time   • clopidogrel (PLAVIX) 75 MG tablet TAKE ONE TABLET BY MOUTH DAILY ** SEE DOCTOR FOR REFILLS ** (Patient taking differently: Take 75 mg by mouth Daily.) 30 tablet 0    • cyanocobalamin 1000 MCG/ML injection Inject 1 mL into the appropriate muscle as directed by prescriber Every 28 (Twenty-Eight) Days. 10 mL 1 11/18/2019   • demeclocycline (DECLOMYCIN) 300 MG tablet Take 300 mg by mouth 2 (Two) Times a Day.  11 11/25/2019 at Unknown time   • famotidine (PEPCID) 40 MG tablet Take 40 mg by mouth Daily.      • hydrALAZINE (APRESOLINE) 25 MG tablet Take 25 mg by mouth 2 (Two) Times a Day As Needed.   Taking   • ipratropium-albuterol (DUO-NEB) 0.5-2.5  mg/3 ml nebulizer Take 3 mL by nebulization Every 4 (Four) Hours As Needed for Wheezing.      • levETIRAcetam (KEPPRA) 500 MG tablet Take 500 mg by mouth 2 (Two) Times a Day.   11/25/2019 at Unknown time   • lidocaine (LIDODERM) 5 % Place 1 patch on the skin as directed by provider Daily. Remove & Discard patch within 12 hours or as directed by MD 30 patch 2    • oxyCODONE (ROXICODONE) 10 MG tablet Take 1 tablet by mouth 4 (Four) Times a Day As Needed for Moderate Pain . 120 tablet 0 11/25/2019 at Unknown time   • sodium chloride 1 g tablet TAKE ONE TABLET TWICE A DAY PER G-TUBE ROUTE WITH MEALS (Patient taking differently: Take 1 g by mouth 2 (Two) Times a Day. Pt has not picked up from pharmacy) 60 tablet 0    • sodium hypochlorite (DAKIN'S) 0.125 % topical solution Apply  topically to the appropriate area as directed Every 12 (Twelve) Hours. 1200 mL 3    • tiZANidine (ZANAFLEX) 4 MG tablet Take 4 mg by mouth Every 8 (Eight) Hours As Needed for Muscle Spasms.          Scheduled Meds:    aluminum-magnesium hydroxide-simethicone 15 mL Oral BID   amitriptyline 100 mg Oral Nightly   carvedilol 12.5 mg Oral BID With Meals   cloNIDine 1 patch Transdermal Weekly   clopidogrel 75 mg Oral Daily   cyanocobalamin 1,000 mcg Intramuscular Q28 Days   demeclocycline 300 mg Oral Q12H   [START ON 12/14/2019] famotidine 20 mg Oral Daily   heparin (porcine) 5,000 Units Subcutaneous Q12H   hydrALAZINE 25 mg Oral Q12H   levETIRAcetam 500 mg Oral Q12H   meropenem 500 mg Intravenous Q8H   miconazole  Topical Q12H   pharmacy 1 each Does not apply Once   polyethylene glycol 17 g Oral Daily   sodium chloride 10 mL Intravenous Q12H   sodium chloride 1 g Oral BID   sodium hypochlorite  Topical Q12H       Continuous Infusions:    sodium chloride 75 mL/hr Last Rate: 75 mL/hr (12/13/19 1026)       PRN Meds:  •  albuterol sulfate HFA  •  calcium carbonate  •  ipratropium-albuterol  •  labetalol  •  lactulose  •  magnesium hydroxide  •  Morphine  **OR** Morphine  •  ondansetron  •  oxyCODONE  •  [COMPLETED] Insert peripheral IV **AND** sodium chloride  •  sodium chloride    Allergies:  Codeine and Sulfa antibiotics    OBJECTIVE    Vital Signs  Temp:  [95.3 °F (35.2 °C)-98.8 °F (37.1 °C)] 97.8 °F (36.6 °C)  Heart Rate:  [89-97] 94  Resp:  [15-20] 18  BP: (126-221)/() 143/71    I/O this shift:  In: -   Out: 300 [Urine:300]  I/O last 3 completed shifts:  In: 0   Out: 400 [Urine:400]    Physical Exam:  General Appearance: alert, appears stated age and cooperative  Head: normocephalic, without obvious abnormality and atraumatic  Eyes: conjunctivae and sclerae normal and no icterus  Neck: supple and no JVD  Lungs: clear to auscultation and respirations regular  Heart: regular rhythm & normal rate and normal S1, S2  Chest Wall: no abnormalities observed  Abdomen: normal bowel sounds and soft non-tender  Extremities: moves extremities well, no edema, no cyanosis and no redness  Skin: no bleeding, bruising or rash  Neurologic: Alert, and oriented. No focal deficits    Results Review:    I reviewed the patient's new clinical results.    WBC WBC   Date Value Ref Range Status   12/13/2019 6.80 3.40 - 10.80 10*3/mm3 Final   12/12/2019 8.10 3.40 - 10.80 10*3/mm3 Final      HGB Hemoglobin   Date Value Ref Range Status   12/13/2019 8.9 (L) 12.0 - 15.9 g/dL Final   12/12/2019 8.1 (L) 12.0 - 15.9 g/dL Final      HCT Hematocrit   Date Value Ref Range Status   12/13/2019 27.2 (L) 34.0 - 46.6 % Final   12/12/2019 25.5 (L) 34.0 - 46.6 % Final      Platlets No results found for: LABPLAT   MCV MCV   Date Value Ref Range Status   12/13/2019 91.8 79.0 - 97.0 fL Final   12/12/2019 91.5 79.0 - 97.0 fL Final          Sodium Sodium   Date Value Ref Range Status   12/13/2019 136 136 - 145 mmol/L Final   12/12/2019 134 (L) 136 - 145 mmol/L Final      Potassium Potassium   Date Value Ref Range Status   12/13/2019 3.7 3.5 - 5.2 mmol/L Final   12/12/2019 3.9 3.5 - 5.2 mmol/L Final       Chloride Chloride   Date Value Ref Range Status   12/13/2019 96 (L) 98 - 107 mmol/L Final   12/12/2019 95 (L) 98 - 107 mmol/L Final      CO2 CO2   Date Value Ref Range Status   12/13/2019 26.0 22.0 - 29.0 mmol/L Final   12/12/2019 26.0 22.0 - 29.0 mmol/L Final      BUN BUN   Date Value Ref Range Status   12/13/2019 23 8 - 23 mg/dL Final   12/12/2019 36 (H) 8 - 23 mg/dL Final      Creatinine Creatinine   Date Value Ref Range Status   12/13/2019 0.69 0.57 - 1.00 mg/dL Final   12/12/2019 0.74 0.57 - 1.00 mg/dL Final      Calcium Calcium   Date Value Ref Range Status   12/13/2019 8.6 8.6 - 10.5 mg/dL Final   12/12/2019 8.7 8.6 - 10.5 mg/dL Final      PO4 No results found for: CAPO4   Albumin Albumin   Date Value Ref Range Status   12/13/2019 3.20 (L) 3.50 - 5.20 g/dL Final      Magnesium No results found for: MG   Uric Acid No results found for: URICACID       Imaging Results (Last 72 Hours)     Procedure Component Value Units Date/Time    CT Abdomen Pelvis Without Contrast [[id number]] Collected:  12/12/19 1452     Updated:  12/12/19 1459    Narrative:       CT ABDOMEN PELVIS WO CONTRAST-     Date of Exam: 12/12/2019 2:45 PM     Indication: Abdominal pain, unspecified  . Constipation.     Comparison: None available.     Technique: Contiguous axial CT images were obtained from the lung bases  to the pubic symphysis without contrast. Sagittal and coronal  reconstructions were performed.  Automated exposure control and  iterative reconstruction methods were used.     FINDINGS:     Large colonic stool burden greatest in the cecum and ascending colon no  pneumatosis or free air or abscess.     Percutaneous gastrostomy tube in place.     No abnormally dilated small bowel loops are identified. Noncontrast  appearance of the liver, gallbladder, spleen, adrenals and kidneys is  within normal limits. Pancreas is moderately atrophic.     Dense airspace disease changes are present in the right middle lobe and  right lower lobe.  Noncalcified nodular densities are present in the left  lower lobe measuring 4 mm (image 6) and 7 mm (image 7), with mild left  basilar atelectasis. There is moderate cardiomegaly.     Old bilateral pubic body and right inferior pubic rami fractures. ORIF  changes of the right femur. Chronic appearing bilateral sacral ala  fractures. Chronic appearing fracture of L5 vertebral body with  compression. Grade 1-2 anterolisthesis L4 upon L5, approximately 13 mm,  thought to be related to severe facet arthropathy.          Impression:          1. Large colonic stool burden, most severe within the cecum and  ascending colon, consistent with the appearance of constipation.  2. No obstructing bowel abnormality is seen, and there is no evidence of  active bowel inflammation.  3. Dense right middle lobe and right lower lobe airspace disease.  Correlate clinically for pneumonia.  4. Small nodular densities in the left lower lobe are nonspecific but  favored to reflect benign infectious/inflammatory nodules.  5. Chronic appearing fractures of the pelvis and L5 vertebral body.  Right femur ORIF  6. Grade 1-2 anterolisthesis L5 upon S1 thought to be related to severe  facet arthropathy.           Electronically Signed By-Dr. Ashley Jerez MD On:12/12/2019 2:57 PM  This report was finalized on [id number] by Dr. Ashley Jerez MD.    XR Chest 2 View [[id number]] Collected:  12/12/19 1114     Updated:  12/12/19 1120    Narrative:       DATE OF EXAM:  12/12/2019 10:48 AM     PROCEDURE:  XR CHEST 2 VW-     INDICATIONS:  cough       COMPARISON:  CT chest 11/26/2019. AP portable chest 11/25/2019.     TECHNIQUE:   Two radiologic views of the chest.     FINDINGS:  Right lower lobe airspace disease persists, and appears fairly similar  to the 11/25/2019 examination. There is new ill-defined airspace disease  in the left lower lobe.     Stable cardiac enlargement. Left chest wall luis catheter remains at  the cavoatrial junction. No  visible pneumothorax. Surgical changes of  the right humerus. Degenerative changes of the left humerus. Chronic  appearing deformity of the distal left clavicle. Diffuse osteopenia.     There is a chronic appearing severe compression fracture of the T5  vertebral body, chronic appearing compression fracture of the superior  endplate of T4, corresponding to the CT chest from 11/26/2019. There is,  however, an age-indeterminate compression fracture of T8, new since  11/26/2019 CT chest.       Impression:          1. Persistent right lower lobe airspace disease worrisome for pneumonia.  New left lower lobe airspace disease worrisome for developing pneumonia.  2. Age indeterminate compression fracture of T8, new since 11/26/2019  comparison. Chronic T4 and T5 compression fractures.     Electronically Signed By-Dr. Ashley Jerez MD On:12/12/2019 11:18 AM  This report was finalized on [id number] by Dr. Ashley Jerez MD.    XR Spine Lumbar Complete 4+VW [[id number]] Collected:  12/12/19 1114     Updated:  12/12/19 1118    Narrative:       XR SPINE LUMBAR COMPLETE 4+VW-     Date of Exam: 12/12/2019 10:22 AM     Indication: Low back pain after fall.     Comparison Exams: September 6, 2017     Technique: 5 radiographs of the lumbar spine     FINDINGS:  No acute fracture is identified. There is generalized osteopenia. The  alignment appears stable, with grade 1/2 anterolisthesis of L4 on L5.  The vertebral body heights appear normal. There is moderate degenerative  loss of disc height at L4-5 and L5-S1. There is mild to moderate facet  arthropathy at L3-4 through L5-S1. The soft tissues are unremarkable.       Impression:       1.No acute fracture or traumatic subluxation of lumbar spine identified.  2.Degenerative changes as described above.     Electronically Signed By-DR. Jevon Dominguez MD On:12/12/2019 11:16 AM  This report was finalized on [id number] by DR. Jevon Dominguez MD.            Results for orders  placed during the hospital encounter of 12/12/19   XR Chest 2 View    Narrative DATE OF EXAM:  12/12/2019 10:48 AM     PROCEDURE:  XR CHEST 2 VW-     INDICATIONS:  cough       COMPARISON:  CT chest 11/26/2019. AP portable chest 11/25/2019.     TECHNIQUE:   Two radiologic views of the chest.     FINDINGS:  Right lower lobe airspace disease persists, and appears fairly similar  to the 11/25/2019 examination. There is new ill-defined airspace disease  in the left lower lobe.     Stable cardiac enlargement. Left chest wall luis catheter remains at  the cavoatrial junction. No visible pneumothorax. Surgical changes of  the right humerus. Degenerative changes of the left humerus. Chronic  appearing deformity of the distal left clavicle. Diffuse osteopenia.     There is a chronic appearing severe compression fracture of the T5  vertebral body, chronic appearing compression fracture of the superior  endplate of T4, corresponding to the CT chest from 11/26/2019. There is,  however, an age-indeterminate compression fracture of T8, new since  11/26/2019 CT chest.       Impression    1. Persistent right lower lobe airspace disease worrisome for pneumonia.  New left lower lobe airspace disease worrisome for developing pneumonia.  2. Age indeterminate compression fracture of T8, new since 11/26/2019  comparison. Chronic T4 and T5 compression fractures.     Electronically Signed By-Dr. Ashley Jerez MD On:12/12/2019 11:18 AM  This report was finalized on [id number] by Dr. Ashley Jerez MD.   XR Spine Lumbar Complete 4+VW    Narrative XR SPINE LUMBAR COMPLETE 4+VW-     Date of Exam: 12/12/2019 10:22 AM     Indication: Low back pain after fall.     Comparison Exams: September 6, 2017     Technique: 5 radiographs of the lumbar spine     FINDINGS:  No acute fracture is identified. There is generalized osteopenia. The  alignment appears stable, with grade 1/2 anterolisthesis of L4 on L5.  The vertebral body heights appear  normal. There is moderate degenerative  loss of disc height at L4-5 and L5-S1. There is mild to moderate facet  arthropathy at L3-4 through L5-S1. The soft tissues are unremarkable.       Impression 1.No acute fracture or traumatic subluxation of lumbar spine identified.  2.Degenerative changes as described above.     Electronically Signed By-DR. Jevon Dominguez MD On:12/12/2019 11:16 AM  This report was finalized on [id number] by DR. Jevon Dominguez MD.              ASSESSMENT / PLAN      Pneumonia due to infectious organism    Dysphagia, pharyngoesophageal phase    Decubitus ulcer of ischial area, right, stage IV (CMS/HCC)    Closed displaced fracture of acromial end of left clavicle    · Hypertension--better. Clonidine patch was applied.   · Hyponatremia, mild--probably due to SIADH. Its better. On demeclocycline  · Hx tonsillar cancer  · Nutrition--to have G-tube placed  · PNA--on ATBx    Continue with clonidine patch, and iv hydralazine, until able to take PO meds.  Will follow      I discussed the patients findings and my recommendations with patient and nursing staff    Will follow along closely. Thank you for allowing us to see this patient in renal consultation.    [facility]  [phone]  MD John Pineda MD  12/13/19  12:45 PM

## 2019-12-14 LAB
ALBUMIN SERPL-MCNC: 2.9 G/DL (ref 3.5–5.2)
ANION GAP SERPL CALCULATED.3IONS-SCNC: 8 MMOL/L (ref 5–15)
BASOPHILS # BLD AUTO: 0 10*3/MM3 (ref 0–0.2)
BASOPHILS NFR BLD AUTO: 0.4 % (ref 0–1.5)
BUN BLD-MCNC: 30 MG/DL (ref 8–23)
BUN/CREAT SERPL: 52.6 (ref 7–25)
CALCIUM SPEC-SCNC: 7.9 MG/DL (ref 8.6–10.5)
CHLORIDE SERPL-SCNC: 103 MMOL/L (ref 98–107)
CO2 SERPL-SCNC: 26 MMOL/L (ref 22–29)
CREAT BLD-MCNC: 0.57 MG/DL (ref 0.57–1)
DEPRECATED RDW RBC AUTO: 52.5 FL (ref 37–54)
EOSINOPHIL # BLD AUTO: 0.2 10*3/MM3 (ref 0–0.4)
EOSINOPHIL NFR BLD AUTO: 3 % (ref 0.3–6.2)
ERYTHROCYTE [DISTWIDTH] IN BLOOD BY AUTOMATED COUNT: 15.9 % (ref 12.3–15.4)
GFR SERPL CREATININE-BSD FRML MDRD: 108 ML/MIN/1.73
GLUCOSE BLD-MCNC: 99 MG/DL (ref 65–99)
HCT VFR BLD AUTO: 23.7 % (ref 34–46.6)
HGB BLD-MCNC: 7.9 G/DL (ref 12–15.9)
LYMPHOCYTES # BLD AUTO: 0.5 10*3/MM3 (ref 0.7–3.1)
LYMPHOCYTES NFR BLD AUTO: 9.2 % (ref 19.6–45.3)
MAGNESIUM SERPL-MCNC: 1.7 MG/DL (ref 1.6–2.4)
MCH RBC QN AUTO: 30.7 PG (ref 26.6–33)
MCHC RBC AUTO-ENTMCNC: 33.3 G/DL (ref 31.5–35.7)
MCV RBC AUTO: 92.3 FL (ref 79–97)
MONOCYTES # BLD AUTO: 0.6 10*3/MM3 (ref 0.1–0.9)
MONOCYTES NFR BLD AUTO: 10.9 % (ref 5–12)
NEUTROPHILS # BLD AUTO: 4.3 10*3/MM3 (ref 1.7–7)
NEUTROPHILS NFR BLD AUTO: 76.5 % (ref 42.7–76)
NRBC BLD AUTO-RTO: 0 /100 WBC (ref 0–0.2)
PHOSPHATE SERPL-MCNC: 2 MG/DL (ref 2.5–4.5)
PLATELET # BLD AUTO: 307 10*3/MM3 (ref 140–450)
PMV BLD AUTO: 7.2 FL (ref 6–12)
POTASSIUM BLD-SCNC: 4.1 MMOL/L (ref 3.5–5.2)
RBC # BLD AUTO: 2.56 10*6/MM3 (ref 3.77–5.28)
SODIUM BLD-SCNC: 137 MMOL/L (ref 136–145)
WBC NRBC COR # BLD: 5.6 10*3/MM3 (ref 3.4–10.8)

## 2019-12-14 PROCEDURE — 25010000002 MORPHINE PER 10 MG: Performed by: FAMILY MEDICINE

## 2019-12-14 PROCEDURE — 25010000002 HEPARIN (PORCINE) PER 1000 UNITS: Performed by: FAMILY MEDICINE

## 2019-12-14 PROCEDURE — 99232 SBSQ HOSP IP/OBS MODERATE 35: CPT | Performed by: INTERNAL MEDICINE

## 2019-12-14 PROCEDURE — 94799 UNLISTED PULMONARY SVC/PX: CPT

## 2019-12-14 PROCEDURE — 80069 RENAL FUNCTION PANEL: CPT | Performed by: INTERNAL MEDICINE

## 2019-12-14 PROCEDURE — 85025 COMPLETE CBC W/AUTO DIFF WBC: CPT | Performed by: INTERNAL MEDICINE

## 2019-12-14 PROCEDURE — 97535 SELF CARE MNGMENT TRAINING: CPT

## 2019-12-14 PROCEDURE — 94640 AIRWAY INHALATION TREATMENT: CPT

## 2019-12-14 PROCEDURE — 97166 OT EVAL MOD COMPLEX 45 MIN: CPT

## 2019-12-14 PROCEDURE — 25010000002 MEROPENEM PER 100 MG: Performed by: FAMILY MEDICINE

## 2019-12-14 PROCEDURE — 83735 ASSAY OF MAGNESIUM: CPT | Performed by: INTERNAL MEDICINE

## 2019-12-14 RX ORDER — WHEY PROTEIN ISOLATE 6 G-25/7 G
1 POWDER (GRAM) ORAL 3 TIMES DAILY
Status: DISCONTINUED | OUTPATIENT
Start: 2019-12-14 | End: 2019-12-18 | Stop reason: HOSPADM

## 2019-12-14 RX ORDER — SODIUM CHLORIDE FOR INHALATION 0.9 %
3 VIAL, NEBULIZER (ML) INHALATION
Status: DISCONTINUED | OUTPATIENT
Start: 2019-12-14 | End: 2019-12-15

## 2019-12-14 RX ORDER — ALBUTEROL SULFATE 2.5 MG/3ML
2.5 SOLUTION RESPIRATORY (INHALATION)
Status: DISCONTINUED | OUTPATIENT
Start: 2019-12-14 | End: 2019-12-18 | Stop reason: HOSPADM

## 2019-12-14 RX ORDER — ACETYLCYSTEINE 200 MG/ML
2 SOLUTION ORAL; RESPIRATORY (INHALATION)
Status: DISCONTINUED | OUTPATIENT
Start: 2019-12-14 | End: 2019-12-18 | Stop reason: HOSPADM

## 2019-12-14 RX ADMIN — HEPARIN SODIUM 5000 UNITS: 5000 INJECTION INTRAVENOUS; SUBCUTANEOUS at 22:15

## 2019-12-14 RX ADMIN — LEVETIRACETAM 500 MG: 500 SOLUTION ORAL at 22:13

## 2019-12-14 RX ADMIN — SODIUM CHLORIDE TAB 1 GM 1 G: 1 TAB at 22:15

## 2019-12-14 RX ADMIN — Medication 1 PACKET: at 17:44

## 2019-12-14 RX ADMIN — LACTULOSE 10 G: 10 SOLUTION ORAL at 22:13

## 2019-12-14 RX ADMIN — MEROPENEM 500 MG: 500 INJECTION, POWDER, FOR SOLUTION INTRAVENOUS at 08:52

## 2019-12-14 RX ADMIN — IPRATROPIUM BROMIDE AND ALBUTEROL SULFATE 3 ML: .5; 3 SOLUTION RESPIRATORY (INHALATION) at 00:00

## 2019-12-14 RX ADMIN — DAKIN'S SOLUTION 0.125% (QUARTER STRENGTH): 0.12 SOLUTION at 22:22

## 2019-12-14 RX ADMIN — DEMECLOCYCLINE HYDROCHLORIDE 300 MG: 150 TABLET, FILM COATED ORAL at 22:17

## 2019-12-14 RX ADMIN — Medication 3 ML: at 19:30

## 2019-12-14 RX ADMIN — OXYCODONE HYDROCHLORIDE 10 MG: 5 TABLET ORAL at 15:10

## 2019-12-14 RX ADMIN — FAMOTIDINE 20 MG: 20 TABLET, FILM COATED ORAL at 08:52

## 2019-12-14 RX ADMIN — AMITRIPTYLINE HYDROCHLORIDE 100 MG: 50 TABLET, FILM COATED ORAL at 22:17

## 2019-12-14 RX ADMIN — IPRATROPIUM BROMIDE AND ALBUTEROL SULFATE 3 ML: .5; 3 SOLUTION RESPIRATORY (INHALATION) at 07:19

## 2019-12-14 RX ADMIN — MICONAZOLE NITRATE: 20 POWDER TOPICAL at 11:43

## 2019-12-14 RX ADMIN — ALUMINUM HYDROXIDE, MAGNESIUM HYDROXIDE, AND DIMETHICONE 15 ML: 400; 400; 40 SUSPENSION ORAL at 08:52

## 2019-12-14 RX ADMIN — ACETYLCYSTEINE 2 ML: 200 SOLUTION ORAL; RESPIRATORY (INHALATION) at 19:24

## 2019-12-14 RX ADMIN — DEMECLOCYCLINE HYDROCHLORIDE 300 MG: 150 TABLET, FILM COATED ORAL at 08:53

## 2019-12-14 RX ADMIN — CARVEDILOL 12.5 MG: 6.25 TABLET, FILM COATED ORAL at 08:53

## 2019-12-14 RX ADMIN — SODIUM CHLORIDE TAB 1 GM 1 G: 1 TAB at 08:52

## 2019-12-14 RX ADMIN — ACETYLCYSTEINE 4 ML: 200 SOLUTION ORAL; RESPIRATORY (INHALATION) at 07:19

## 2019-12-14 RX ADMIN — POLYETHYLENE GLYCOL 3350 17 G: 17 POWDER, FOR SOLUTION ORAL at 08:52

## 2019-12-14 RX ADMIN — CLOPIDOGREL BISULFATE 75 MG: 75 TABLET ORAL at 08:53

## 2019-12-14 RX ADMIN — LEVETIRACETAM 500 MG: 500 SOLUTION ORAL at 08:52

## 2019-12-14 RX ADMIN — MEROPENEM 500 MG: 500 INJECTION, POWDER, FOR SOLUTION INTRAVENOUS at 17:44

## 2019-12-14 RX ADMIN — ALUMINUM HYDROXIDE, MAGNESIUM HYDROXIDE, AND DIMETHICONE 15 ML: 400; 400; 40 SUSPENSION ORAL at 22:14

## 2019-12-14 RX ADMIN — RACEPINEPHRINE HYDROCHLORIDE 0.5 ML: 11.25 SOLUTION RESPIRATORY (INHALATION) at 00:38

## 2019-12-14 RX ADMIN — MORPHINE SULFATE 4 MG: 4 INJECTION INTRAVENOUS at 02:38

## 2019-12-14 RX ADMIN — HYDRALAZINE HYDROCHLORIDE 25 MG: 25 TABLET, FILM COATED ORAL at 08:53

## 2019-12-14 RX ADMIN — Medication 10 ML: at 08:53

## 2019-12-14 RX ADMIN — CARVEDILOL 12.5 MG: 6.25 TABLET, FILM COATED ORAL at 17:51

## 2019-12-14 RX ADMIN — Medication 10 ML: at 22:17

## 2019-12-14 RX ADMIN — MORPHINE SULFATE 4 MG: 4 INJECTION INTRAVENOUS at 13:51

## 2019-12-14 RX ADMIN — MICONAZOLE NITRATE: 20 POWDER TOPICAL at 22:22

## 2019-12-14 RX ADMIN — MORPHINE SULFATE 4 MG: 4 INJECTION INTRAVENOUS at 17:45

## 2019-12-14 RX ADMIN — MORPHINE SULFATE 4 MG: 4 INJECTION INTRAVENOUS at 22:15

## 2019-12-14 RX ADMIN — HEPARIN SODIUM 5000 UNITS: 5000 INJECTION INTRAVENOUS; SUBCUTANEOUS at 08:52

## 2019-12-14 RX ADMIN — DAKIN'S SOLUTION 0.125% (QUARTER STRENGTH): 0.12 SOLUTION at 11:43

## 2019-12-14 RX ADMIN — ALBUTEROL SULFATE 2.5 MG: 2.5 SOLUTION RESPIRATORY (INHALATION) at 19:23

## 2019-12-14 RX ADMIN — MORPHINE SULFATE 4 MG: 4 INJECTION INTRAVENOUS at 08:46

## 2019-12-14 NOTE — PLAN OF CARE
Pt c/o of abdominal pain; pulling legs up to her abdomin at times, medicated with morphine.  Family does pt's tube feedings.  Completed dressing changes.

## 2019-12-14 NOTE — PROGRESS NOTES
[facility] Medicine Services Daily Progress Note      Hospitalist Team  LOS 1 days      Patient Care Team:  Leonid Villeda Jr., MD as PCP - General  Leonid Villeda Jr., MD as PCP - Family Medicine  John Canales MD as Consulting Physician (Nephrology)    Patient Location: 2107/1      Subjective   Subjective     Chief Complaint / Subjective  Chief Complaint   Patient presents with   • Back Pain       Present on Admission:  • Pneumonia due to infectious organism  • Decubitus ulcer of ischial area, right, stage IV (CMS/HCC)  • Dysphagia, pharyngoesophageal phase  • Closed displaced fracture of acromial end of left clavicle      Brief Synopsis of Hospital Course/HPI  The patient is a 59-year-old white female who was admitted to hospital with weakness and confusion.     The patient was just discharged from the hospital on November 30, 2019.  The patient was in the hospital at that time because of pneumonia pressure sore of the sacrun and fracture of the left clavicle.     Arrangements were made for the patient to receive IV antibiotics on discharge to complete the treatment for her pneumonia and a sacral ulcer.  She was also to take amoxicillin by mouth or by G-tube.  Home health and home physical therapy were arranged.     Patient has a history of tonsillar cancer.  The patient has had treatment for this.  The patient has severe oropharyngeal dysphagia as a result of this.  The patient is fed by G-tube.  She requires assistance for care at home.  She currently lives at home with her  who is a paraplegic.  They rely heavily on her daughter who is an RN for care.  Patient's daughter however states that she cannot meet the needs her mother at this time.     The patient's  states that she was complaining of abdominal pain today.  When he examined her abdominal wall area her gastrostomy tube had been pulled out.  He placed a Valderrama catheter into the gastrostomy tube site.  He  "brought her to the emergency room.     The member states that the patient has been confused since she has been home.    Date:: 12/14/2019 patient seen and examined and seemed to doing fairly well.  Denies any complaints.        Review of Systems   All other systems reviewed and are negative.        Objective   Objective      Vital Signs  Temp:  [97.2 °F (36.2 °C)-98.7 °F (37.1 °C)] 98.3 °F (36.8 °C)  Heart Rate:  [78-93] 78  Resp:  [12-20] 12  BP: ()/(48-86) 150/82  Oxygen Therapy  SpO2: 100 %  Pulse Oximetry Type: Continuous  Device (Oxygen Therapy): nasal cannula  Flow (L/min): 3  Oxygen Concentration (%): 21  Flowsheet Rows      First Filed Value   Admission Height  157.5 cm (62\") Documented at 12/12/2019 0905   Admission Weight  39.5 kg (87 lb 1.3 oz) Documented at 12/12/2019 0905        Intake & Output (last 3 days)       12/11 0701 - 12/12 0700 12/12 0701 - 12/13 0700 12/13 0701 - 12/14 0700 12/14 0701 - 12/15 0700    P.O.  0 0     Total Intake(mL/kg)  0 (0) 0 (0)     Urine (mL/kg/hr)  400 500 (0.5) 1950 (4.1)    Total Output      Net  -400 -500 -1950                Lines, Drains & Airways    Active LDAs     Name:   Placement date:   Placement time:   Site:   Days:    Gastrostomy/Enterostomy Umbilicus   07/28/19    0000    Umbilicus   139    Single Lumen Implantable Port 11/25/19 Left Chest   11/25/19    1613    Chest   19                  Physical Exam:    Physical Exam   Constitutional: She is oriented to person, place, and time. She appears well-developed and well-nourished. No distress.   HENT:   Head: Normocephalic and atraumatic.   Nose: Nose normal.   Mouth/Throat: No oropharyngeal exudate.   Eyes: Pupils are equal, round, and reactive to light. Conjunctivae and EOM are normal. Right eye exhibits no discharge. Left eye exhibits no discharge. No scleral icterus.   Neck: Normal range of motion. No JVD present. No tracheal deviation present. No thyromegaly present.   Cardiovascular: " Normal rate, regular rhythm, normal heart sounds and intact distal pulses. Exam reveals no gallop and no friction rub.   No murmur heard.  Pulmonary/Chest: Effort normal and breath sounds normal. No stridor. No respiratory distress. She has no wheezes. She has no rales. She exhibits no tenderness.   Abdominal: Soft. Bowel sounds are normal. She exhibits no distension and no mass. There is no tenderness. There is no rebound and no guarding. No hernia.   Musculoskeletal: Normal range of motion. She exhibits no edema, tenderness or deformity.   Lymphadenopathy:     She has no cervical adenopathy.   Neurological: She is alert and oriented to person, place, and time. No cranial nerve deficit or sensory deficit. She exhibits normal muscle tone. Coordination normal.   Skin: Skin is warm and dry. No rash noted. She is not diaphoretic. No erythema.   Psychiatric: She has a normal mood and affect. Her behavior is normal.   Nursing note and vitals reviewed.        Procedures:      Results Review:     I reviewed the patient's new clinical results.      Lab Results (last 24 hours)     Procedure Component Value Units Date/Time    Blood Culture - Blood, Arm, Right [[id number]] Collected:  12/12/19 1212    Specimen:  Blood from Arm, Right Updated:  12/14/19 1230     Blood Culture No growth at 2 days    Blood Culture - Blood, Blood, Venous Line [[id number]] Collected:  12/12/19 1157    Specimen:  Blood, Venous Line Updated:  12/14/19 1215     Blood Culture No growth at 2 days    Magnesium [[id number]]  (Normal) Collected:  12/14/19 0611    Specimen:  Blood Updated:  12/14/19 0636     Magnesium 1.7 mg/dL     Renal Function Panel [[id number]]  (Abnormal) Collected:  12/14/19 0611    Specimen:  Blood Updated:  12/14/19 0636     Glucose 99 mg/dL      BUN 30 mg/dL      Creatinine 0.57 mg/dL      Sodium 137 mmol/L      Potassium 4.1 mmol/L      Chloride 103 mmol/L      CO2 26.0 mmol/L      Calcium 7.9 mg/dL      Albumin 2.90 g/dL       Phosphorus 2.0 mg/dL      Anion Gap 8.0 mmol/L      BUN/Creatinine Ratio 52.6     eGFR Non African Amer 108 mL/min/1.73     Narrative:       GFR Normal >60  Chronic Kidney Disease <60  Kidney Failure <15      CBC & Differential [[id number]] Collected:  12/14/19 0611    Specimen:  Blood Updated:  12/14/19 0617    Narrative:       The following orders were created for panel order CBC & Differential.  Procedure                               Abnormality         Status                     ---------                               -----------         ------                     CBC Auto Differential[[id number]]        Abnormal            Final result                 Please view results for these tests on the individual orders.    CBC Auto Differential [[id number]]  (Abnormal) Collected:  12/14/19 0611    Specimen:  Blood Updated:  12/14/19 0617     WBC 5.60 10*3/mm3      RBC 2.56 10*6/mm3      Hemoglobin 7.9 g/dL      Hematocrit 23.7 %      MCV 92.3 fL      MCH 30.7 pg      MCHC 33.3 g/dL      RDW 15.9 %      RDW-SD 52.5 fl      MPV 7.2 fL      Platelets 307 10*3/mm3      Neutrophil % 76.5 %      Lymphocyte % 9.2 %      Monocyte % 10.9 %      Eosinophil % 3.0 %      Basophil % 0.4 %      Neutrophils, Absolute 4.30 10*3/mm3      Lymphocytes, Absolute 0.50 10*3/mm3      Monocytes, Absolute 0.60 10*3/mm3      Eosinophils, Absolute 0.20 10*3/mm3      Basophils, Absolute 0.00 10*3/mm3      nRBC 0.0 /100 WBC         No results found for: HGBA1C                Microbiology Results (last 10 days)     Procedure Component Value - Date/Time    Blood Culture - Blood, Arm, Right [[id number]] Collected:  12/12/19 1212    Lab Status:  Preliminary result Specimen:  Blood from Arm, Right Updated:  12/14/19 1230     Blood Culture No growth at 2 days    Respiratory Panel, PCR - Swab, Nasopharynx [[id number]]  (Abnormal) Collected:  12/12/19 1204    Lab Status:  Final result Specimen:  Swab from Nasopharynx Updated:  12/12/19 1323     ADENOVIRUS,  PCR Not Detected     Coronavirus 229E Not Detected     Coronavirus HKU1 Not Detected     Coronavirus NL63 Not Detected     Coronavirus OC43 Not Detected     Human Metapneumovirus Not Detected     Human Rhinovirus/Enterovirus Detected     Influenza B PCR Not Detected     Parainfluenza Virus 1 Not Detected     Parainfluenza Virus 2 Not Detected     Parainfluenza Virus 3 Not Detected     Parainfluenza Virus 4 Not Detected     Bordetella pertussis pcr Not Detected     Influenza A H1 2009 PCR Not Detected     Chlamydophila pneumoniae PCR Not Detected     Mycoplasma pneumo by PCR Not Detected     Influenza A PCR Not Detected     Influenza A H3 Not Detected     Influenza A H1 Not Detected     RSV, PCR Not Detected    Blood Culture - Blood, Blood, Venous Line [[id number]] Collected:  12/12/19 1157    Lab Status:  Preliminary result Specimen:  Blood, Venous Line Updated:  12/14/19 1215     Blood Culture No growth at 2 days          ECG/EMG Results (most recent)     None               Results for orders placed during the hospital encounter of 07/28/19   Adult Transthoracic Echo Complete W/ Cont if Necessary Per Protocol    Narrative · Estimated EF = 65%.  · Left ventricular systolic function is normal.  · Left ventricular diastolic dysfunction (grade I) consistent with   impaired relaxation.  · Mild mitral valve regurgitation is present  · Mild tricuspid valve regurgitation is present.  · Technically difficult study limited views were obtained most probably   preserved LV systolic function without any significant Doppler   abnormalities          Ct Abdomen Pelvis Without Contrast    Result Date: 12/12/2019   1. Large colonic stool burden, most severe within the cecum and ascending colon, consistent with the appearance of constipation. 2. No obstructing bowel abnormality is seen, and there is no evidence of active bowel inflammation. 3. Dense right middle lobe and right lower lobe airspace disease. Correlate clinically for  pneumonia. 4. Small nodular densities in the left lower lobe are nonspecific but favored to reflect benign infectious/inflammatory nodules. 5. Chronic appearing fractures of the pelvis and L5 vertebral body. Right femur ORIF 6. Grade 1-2 anterolisthesis L5 upon S1 thought to be related to severe facet arthropathy.    Electronically Signed By-Dr. sAhley Jerez MD On:12/12/2019 2:57 PM This report was finalized on [id number] by Dr. Ashley Jerez MD.    Xr Chest 2 View    Result Date: 12/12/2019   1. Persistent right lower lobe airspace disease worrisome for pneumonia. New left lower lobe airspace disease worrisome for developing pneumonia. 2. Age indeterminate compression fracture of T8, new since 11/26/2019 comparison. Chronic T4 and T5 compression fractures.  Electronically Signed By-Dr. Ashley Jerez MD On:12/12/2019 11:18 AM This report was finalized on [id number] by Dr. Ashley Jerez MD.    Xr Spine Lumbar Complete 4+vw    Result Date: 12/12/2019  1.No acute fracture or traumatic subluxation of lumbar spine identified. 2.Degenerative changes as described above.  Electronically Signed By-DR. Jevon Dominguez MD On:12/12/2019 11:16 AM This report was finalized on [id number] by DR. Jevon Dominguez MD.      Xrays, labs reviewed personally by physician.    Medication Review:   I have reviewed the patient's current medication list      Scheduled Meds    acetylcysteine 2 mL Nebulization BID - RT   albuterol 2.5 mg Nebulization BID - RT   aluminum-magnesium hydroxide-simethicone 15 mL Oral BID   amitriptyline 100 mg Oral Nightly   BENEPROTEIN 1 packet Per G Tube TID   carvedilol 12.5 mg Oral BID With Meals   cloNIDine 1 patch Transdermal Weekly   clopidogrel 75 mg Oral Daily   cyanocobalamin 1,000 mcg Intramuscular Q28 Days   demeclocycline 300 mg Oral Q12H   famotidine 20 mg Oral Daily   heparin (porcine) 5,000 Units Subcutaneous Q12H   levETIRAcetam 500 mg Oral Q12H   meropenem 500 mg Intravenous Q8H    miconazole  Topical Q12H   pharmacy 1 each Does not apply Once   polyethylene glycol 17 g Oral Daily   sodium chloride 10 mL Intravenous Q12H   sodium chloride 3 mL Nebulization TID - RT   sodium chloride 1 g Oral BID   sodium hypochlorite  Topical Q12H       Meds Infusions    sodium chloride 75 mL/hr Last Rate: 75 mL/hr (12/13/19 2120)       Meds PRN  albuterol sulfate HFA  •  calcium carbonate  •  ipratropium-albuterol  •  labetalol  •  lactulose  •  magnesium hydroxide  •  Morphine **OR** Morphine  •  ondansetron  •  oxyCODONE  •  [COMPLETED] Insert peripheral IV **AND** sodium chloride  •  sodium chloride      Assessment/Plan   Assessment/Plan     Active Hospital Problems    Diagnosis  POA   • **Pneumonia due to infectious organism [J18.9]  Yes   • Closed displaced fracture of acromial end of left clavicle [S42.032A]  Yes   • Decubitus ulcer of ischial area, right, stage IV (CMS/HCC) [L89.314]  Yes   • Dysphagia, pharyngoesophageal phase [R13.14]  Yes      Resolved Hospital Problems   No resolved problems to display.       Principal Problem:    Pneumonia due to infectious organism-patient will be cultured and placed on antibiotic therapy empirically.  The patient did test positive for rhinovirus.  Patient be seen by pulmonary medicine.  -Patient is on meropenem and has been seen by pulmonary.     Active Problems:    Dysphagia, pharyngoesophageal phase-unchanged-patient will be kept n.p.o.  Gastrostomy tube will be replaced.       Decubitus ulcer of ischial area, right, stage IV (CMS/HCC) and unchanged-obesity patient will be seen in consultation by wound therapy once again.       Closed displaced fracture of acromial end of left clavicle stable        Hypertension-patient's blood pressures been elevated since being in the hospital.  She will be placed on a Cardene drip and seen by nephrology.            VTE Prophylaxis - heparin.      Code Status -   Code Status and Medical Interventions:   Ordered at:  12/12/19 1742     Level Of Support Discussed With:    Patient     Code Status:    CPR     Medical Interventions (Level of Support Prior to Arrest):    Full       Discharge Planning    Destination      Service Provider Request Status Selected Services Address Phone Number Fax Number    [facility] Pending - Request Sent N/A [address] [place] [address] [phone]    [facility]  No Bed Available N/A [address] [place] [address] [phone]      Durable Medical Equipment      Coordination has not been started for this encounter.      Dialysis/Infusion      Coordination has not been started for this encounter.      Home Medical Care      Coordination has not been started for this encounter.      Therapy      Coordination has not been started for this encounter.      Community Resources      Coordination has not been started for this encounter.            Electronically signed by Ugo Cedillo MD, 12/14/19, 6:44 PM.  [facility] Hospitalist Team

## 2019-12-14 NOTE — CONSULTS
Nutrition Services    Patient Name:  Sri Bobo  YOB: 1960  MRN: [id number]  Admit Date:  12/12/2019     Comment:    Pt is well known to RD staff here from previous admits. Pt with long hx of using own home blenderized TF regimen, does not like pre-made TF products r/t cause GI distress.  Pt and pt's  have been made aware upon several admissions because of their preference to use the blenderized TF, the RD is unable to properly oversee the provision of protein, kcals, water, micronutrients, and food safety.     Pt now with pressure ulcers, will order beneprotein TID via PEG to promote wound healing.  If patient does not tolerate or wish to receive these products, you may DC them per her wishes.       Reason for Assessment     Row Name           Reason for Assessment    Reason For Assessment Low BMI consult        Diagnosis H&P: anemia, Malignant neoplasm of tonsillar fossa , s/p PEG tube, GERD, CKD, recent hospital stay in Nov r/t PNA/pressure ulcers, and fx of left clavicle.     Current Problems: Pneumonia due to infectious sggcxwje-iparye-ialtzvfi are negative so far.  Patient did test positive for rhinovirus.  Pulmonology following        Dysphagia, pharyngoesophageal phase-stable-patient had PEG tube replaced here 12/13/19 r/t dislodgement of PEG.        Decubitus ulcer of ischial area, right, stage IV-stable and patient is being by wound therapy.     Closed displaced fracture of acromial end of left clavicle stable     Constipation- will start cathartics         Nutrition/Diet History     Row Name           Nutrition/Diet History    Typical Food/Fluid Intake 12/14/19: At attempted visit today, pt laying on side sleeping. She would not wake when name called. No family present at bedside.     11/26/19 per previous admission RD assessment: Pt is well known to RD staff here from previous admits. Pt with long hx of using own home blenderized TF regimen, does not like pre-made TF products  r/t cause GI distress.      Visited pt and pt's  present. Pt reports she continues to use home blenderized tube feedings. Pt does not use a specific recipe. Family blenderizes whatever they are eating for the day and provide it to the pt via PEG tube.  states he gives pt btw 3-4 feedings per day. Along with 3-4 tubes of water per feeding (each tube holds 70 ccs of water, 210-280 ccs of water per feeding).      Pt had PEG tube placed in 2013 r/t dysphagia post radiation tx for her tonsil cancer. Pt is NPO and uses PEG to meet her nutritional needs.      Pt's  reports pts weight fluctuates depending on her health status (85-95#).      They understand because of their preference to use the blenderized TF, the RD is unable to properly oversee the provision of protein, kcals, water, micronutrients, and food safety.        Functional Status Pt from home, per  uses a walker/cane at times.         Food Allergies  NKFA       Factors Affecting Nutritional Intake  Dysphagia          Anthropometrics             Anthropometrics    Height 62 inches     Weight 40.3 kg (88 lb 13.5 oz)    (12/14/19)       Admit Weight    Admit Weight 39.5 kg (87 lb 1.3 oz)    (12/12/19)       Ideal Body Weight (IBW)    Ideal Body Weight (IBW) (kg) 110#     % Ideal Body Weight 80%        Usual Body Weight (UBW)    Usual Body Weight 85-95#    % Usual Body Weight 104%-93%     Weight Hx    90# (11/25/19)   From previous RD note:  84# (10/22/19)  89# (9/20/19)  89# (7/2/19)  89# (6/17/19)       Body Mass Index (BMI)    BMI (kg/m2) 16.25           Labs/Tests/Procedures/Meds                Labs    Pertinent Lab Results Comments Gluc 99, Na 137, K+ 4.1, Crt 0.57, BUN 30 H, Ca 7.9 L, Alb 2.9 L, Phos 2.0 L, Mg 1.7, Hgb 7.9 L, Hct 23.7 L         Medications    Pertinent Medications Comments Maalox, B12 injection, demeclocycline, pepcid, keppra, merrem, miralax, NaCL, morphine prn          Physical Findings                Physical  Findings    Overall Physical Appearance Pt appears frail, needs NFPE at follow up     Gastrointestinal No BM since admit      Tubes PEG      Oral/Mouth Cavity Noted dysphagia hx      Skin DTI to left lumbar spine  Right hip incision  Unstg to right gluteal region          Estimated/Assessed Needs              Calculation Measurements    Weight Used For Calculations 40.3 kg      Height 62 inches         KCAL/KG    40 KCAL/KG 1612 kcals               Protein Requirements    Weight Used For Protein Calculations 40.3 kg      Est Protein Requirement Amount (gms/kg) 1.5 gm/kg     Estimated Protein Requirements (gms/day) 60 gm         Fluid Requirements    Estimated Fluid Requirements (mL/day) 1612 ml- 1ml/kcal-adjust based on hydration status          Nutrition Prescription Ordered                Nutrition Prescription PO    Current PO Diet  NPO      Supplement  -- -     Supplement Frequency  -- -        Nutrition Prescription EN    Enteral Route PEG      Product Pt brings in own blenderized TF      TF Delivery Method  -----     Continuous TF Goal Rate (mL/hr)  -- -     Water flush (mL)   -- -     Water Flush Frequency  -- -        Nutrition Prescription PN    PN Route  -- -     PN Goal Volume (mL)  -- -     Dextrose (Kcal)  -- -     Amino Acid (gm)  -- -     Lipid Concentration (%)  -- -     Lipid Volume (mL)  -- -     Lipid Frequency  -- -         Evaluation of Received Nutrient/Fluid Intake                PO Evaluation    % PO Intake 0% r/t NPO status         EN Evaluation    TF Changes  -- -     TF Residual  -- -     TF Tolerance  -- -        PN Evaluation    Number of Days PN Evaluated  -- -           Problem/Interventions:  Problem 1              Nutrition Diagnoses Problem 1   Problem 1 .      Nutrition Diagnoses Problem 2   Problem 2 Inadequate oral intakes r/t dysphagia AEB NPO diet order and s/p PEG requiring EN to meet nutritional needs     Nutrition Diagnoses Problem 3   Problem 3 Increased nutrient needs  (protein, micronutrients) r/t increased demand for wound healing AEB pt with multiple areas of skin impairment.      Intervention Goal                Intervention Goal    General Pt will tolerate blenderized TF from home    Pt will be accepting of TF modulars          Nutrition Intervention                Nutrition Intervention    RD/Tech Action Starting TF modulars to promote wound healing          Nutrition Prescription     Row Name           Nutrition Prescription PO    Supplement Beneprotein TID      Currently cannot start Mauricio r/t nutrient interactions (Ca/Zinc) with demeclocycline. If at follow up pt has completed ABT, recommend to consider adding Mauricio.         Nutrition Prescription EN    Enteral Prescription  -- -        Nutrition Prescription PN    Parenteral Prescription  -- -         Education/Evaluation                Monitor/Evaluation    Monitor Weights, I/Os, labs, BM and skin            Electronically signed by:  Chata Bhat RD  12/14/19 11:02 AM

## 2019-12-14 NOTE — PLAN OF CARE
pt presents w a decline from her baseline in adls/mobility, requiring up to mod a for mobility and max a for adls. pt having a lot of abdominal pain and is tolerating very little activity. she was not able to tolerate sitting up in the chair and required return to bed. she is weak and very unsteady on her feet, at high risk for falls. recommend ip rehab stay at [place].

## 2019-12-14 NOTE — PROGRESS NOTES
Daily Progress Note        Pneumonia due to infectious organism    Dysphagia, pharyngoesophageal phase    Decubitus ulcer of ischial area, right, stage IV (CMS/HCC)    Closed displaced fracture of acromial end of left clavicle      Assessment     rhino virus positive     CT chest  showed  Persistent consolidation right lower lobe and mild Bronchiectasis,  Overall  Compatible with chronic aspiration and not indicative of active pneumonia    Patient currently on room air denies any shortness of breath from baseline     Sputum cultures mixed respiratory suzy    Wound culture showed light growth of E. coli ESBL and enterococcus species      past medical history significant for:  bronchoscopy 03/27/2019 showed E coli with ESBL treated with meropenem  bronchoscopy 01/25/2019 AFB cultures negative  CT scan 01/17/2019 showed right lower lobe obstruction with mucus plug or tumor   bronchoscopy July 2018 MRSA pneumonia  Right upper lobe nodules,  CT scan of the chest  08/24/2018 compared to 07/26/2018 just showed decreased size  favoring infectious process  Status post a fall and right wrist fracture  Patient was treated recently for MRSA  Right lower lobe pneumonia with IV vancomycin  CT scan of the chest 07/26/2018 showed 2 right upper lobe lung nodules suspicious for malignancy     Bronchoscopy 07/27/2018 for coughing up blood however there was No evidence of active hemoptysis  Moderate to severe mucopurulent secretions were noted in the right lower lobe and right upper lobe  FNA from RLL negative cytology, BAL Rt lung negative cytology  History of left tonsillar cancer in 2013, status post PEG tube  Status post radiation     Status post EGD 10/03/2017 no obvious source of bleeding  BRONCHOSCOPY 10/05/2017, no hemoptysis, No endobronchial lesions  Chronic inflammatory changes cultures were positive for group B streptococcal pneumonia     seizure disorder  Osteoporosis  Peripheral vascular disease  Protein calorie  malnutrition        Plan  Antibiotics  Currently on  meropenem     Mucomyst nebulized mixed with albuterol                      LOS: 0 days     Subjective         Objective     Vital signs for last 24 hours:  Vitals:    12/13/19 0631 12/13/19 1032 12/13/19 1450 12/13/19 1800   BP: 143/71 165/77 107/54 106/60   Pulse: 94 94 87 89   Resp: 18 20 18 16   Temp:  97.8 °F (36.6 °C) 97.9 °F (36.6 °C) 97.7 °F (36.5 °C)   TempSrc:  Oral Oral Oral   SpO2: 99% 95% 98% 97%   Weight:       Height:           Intake/Output last 3 shifts:  I/O last 3 completed shifts:  In: 0   Out: 900 [Urine:900]  Intake/Output this shift:  No intake/output data recorded.      Radiology  Imaging Results (Last 24 Hours)     ** No results found for the last 24 hours. **          Labs:  Results from last 7 days   Lab Units 12/13/19  0334   WBC 10*3/mm3 6.80   HEMOGLOBIN g/dL 8.9*   HEMATOCRIT % 27.2*   PLATELETS 10*3/mm3 323     Results from last 7 days   Lab Units 12/13/19  0334   SODIUM mmol/L 136   POTASSIUM mmol/L 3.7   CHLORIDE mmol/L 96*   CO2 mmol/L 26.0   BUN mg/dL 23   CREATININE mg/dL 0.69   CALCIUM mg/dL 8.6   BILIRUBIN mg/dL 0.4   ALK PHOS U/L 85   ALT (SGPT) U/L 8   AST (SGOT) U/L 14   GLUCOSE mg/dL 89         Results from last 7 days   Lab Units 12/13/19  0334   ALBUMIN g/dL 3.20*                               Meds:   SCHEDULE    aluminum-magnesium hydroxide-simethicone 15 mL Oral BID   amitriptyline 100 mg Oral Nightly   carvedilol 12.5 mg Oral BID With Meals   cloNIDine 1 patch Transdermal Weekly   clopidogrel 75 mg Oral Daily   cyanocobalamin 1,000 mcg Intramuscular Q28 Days   demeclocycline 300 mg Oral Q12H   [START ON 12/14/2019] famotidine 20 mg Oral Daily   heparin (porcine) 5,000 Units Subcutaneous Q12H   hydrALAZINE 25 mg Oral Q12H   levETIRAcetam 500 mg Oral Q12H   meropenem 500 mg Intravenous Q8H   miconazole  Topical Q12H   pharmacy 1 each Does not apply Once   polyethylene glycol 17 g Oral Daily   sodium chloride 10 mL  Intravenous Q12H   sodium chloride 1 g Oral BID   sodium hypochlorite  Topical Q12H     Infusions    sodium chloride 75 mL/hr Last Rate: 75 mL/hr (12/13/19 2120)     PRNs  •  albuterol sulfate HFA  •  calcium carbonate  •  ipratropium-albuterol  •  labetalol  •  lactulose  •  magnesium hydroxide  •  Morphine **OR** Morphine  •  ondansetron  •  oxyCODONE  •  [COMPLETED] Insert peripheral IV **AND** sodium chloride  •  sodium chloride    Physical Exam:  Physical Exam   Cardiovascular:   Murmur heard.  Pulmonary/Chest: No respiratory distress. She has wheezes. She has rales. She exhibits no tenderness.   Abdominal: She exhibits no distension. There is no tenderness.   Musculoskeletal: She exhibits no edema.       ROS  Review of Systems   HENT: Positive for congestion, rhinorrhea and sneezing.    Respiratory: Positive for cough. Negative for apnea, choking, chest tightness, shortness of breath and wheezing.    Cardiovascular: Negative for leg swelling.             Total time spent with patient greater than: 1 Hour

## 2019-12-14 NOTE — THERAPY EVALUATION
Acute Care - Occupational Therapy Initial Evaluation  [facility]     Patient Name: Sri Bobo  : 1960  MRN: [id number]  Today's Date: 2019             Admit Date: 2019       ICD-10-CM ICD-9-CM   1. Pneumonia of both lower lobes due to infectious organism (CMS/[facility]) J18.1 483.8   2. Attention to gastrostomy tube (CMS/[facility]) Z43.1 V55.1   3. Constipation, unspecified constipation type K59.00 564.00   4. Rhinovirus B34.8 079.3   5. Closed fracture of eighth thoracic vertebra, unspecified fracture morphology, initial encounter (CMS/[facility]) S22.069A 805.2   6. Fever, unspecified fever cause R50.9 780.60     Patient Active Problem List   Diagnosis   • Anemia, B12 deficiency   • Dysphagia, pharyngoesophageal phase   • Malignant neoplasm of tonsillar fossa (CMS/[facility])   • Osteoporosis   • Spinal stenosis, lumbar   • Erosive esophagitis   • Polyarthralgia   • Peripheral vascular disease (CMS/[facility])   • Stenosis of right carotid artery   • Anxiety   • Benign essential hypertension   • Anemia   • Pneumonia due to infectious organism   • Decubitus ulcer of ischial area, right, stage IV (CMS/[facility])   • Closed displaced fracture of acromial end of left clavicle     Past Medical History:   Diagnosis Date   • Anemia    • Broken hip (CMS/[facility]) 2019    rt hip broken   • Cancer (CMS/[facility]) 2013    Throat   • Cerebral hemorrhage (CMS/[facility])    • Dislocated shoulder 2017    dislocated left shoulder   • Fall 2018    rt shoulder and elbow injured with fall, was inpt at [facility] ( to start PT in Dec 2018)   • Gastrostomy tube in place (CMS/[facility])    • GERD (gastroesophageal reflux disease)    •  1 para 1     [id number]   • Hip pain     lt   • History of right common carotid artery stent placement    • Hypertension    • Pneumonia 2018   • Renal failure, chronic, stage 3 (moderate) (CMS/[facility])    • Seizures (CMS/[facility])    • Shingles 2017   • Vitamin B 12 deficiency      Past Surgical History:   Procedure Laterality Date   •  BUNIONECTOMY     • CAROTID STENT Right    • DEBRIDEMENT OF ISCHEAL ULCER/BUTTOCKS WOUND Right 11/26/2019    Procedure: DEBRIDEMENT OF ISCHEAL ULCER/BUTTOCKS WOUND;  Surgeon: Melanie Zuñiga MD;  Location: [facility] MAIN OR;  Service: General   • ESOPHAGEAL DILATATION      botox injection   • GTUBE INSERTION     • HIP SURGERY Right 03/2019    rt hip broken   • ORIF ULNA/RADIUS FRACTURES Right     ORIF, right distal radius fx 08/10/2018 metal plate in right shoulder   • WRIST FRACTURE SURGERY Left 2012          OT ASSESSMENT FLOWSHEET (last 12 hours)      Occupational Therapy Evaluation     Row Name 12/14/19 1100                   OT Evaluation Time/Intention    Subjective Information  complains of;pain abdominal pain; rn aware  -AL        Document Type  evaluation  -AL        Mode of Treatment  occupational therapy  -AL        Patient Effort  fair  -AL        Comment  very garbled speech 2* hx of tonsillar cancer. pt somewhat confused when reporting historical info. says she is more indep than she is, according to .   -AL           General Information    Patient Profile Reviewed?  yes  -AL        Prior Level of Function  ADL's;all household mobility;community mobility;dependent:;home management;driving;shopping;independent: spouse provides supervision for tub transfers.   -AL        Equipment Currently Used at Home  walker, rolling  -AL        Pertinent History of Current Functional Problem  60 y/o f w/ hx of tonsilar cancer, admitted with increased weakness, ams and accidental removal of g tube. dx of pna and (+) for rhinovirus  -AL        Existing Precautions/Restrictions  fall  -AL           Relationship/Environment    Lives With  spouse spouse wc bound/quadraplegic but very indep and active  -AL           Resource/Environmental Concerns    Current Living Arrangements  home/apartment/condo  -AL           Home Main Entrance    Number of Stairs, Main Entrance  none  -AL           Cognitive Assessment/Intervention-  PT/OT    Orientation Status (Cognition)  oriented x 3  -AL           Safety Issues, Functional Mobility    Impairments Affecting Function (Mobility)  balance;endurance/activity tolerance;strength;shortness of breath;pain  -AL           Bed Mobility Assessment/Treatment    Supine-Sit [place] (Bed Mobility)  minimum assist (75% patient effort)  -AL        Sit-Supine [place] (Bed Mobility)  contact guard  -AL        Comment (Bed Mobility)  increased assist likely related to abdominal pains  -AL           Transfer Assessment/Treatment    Transfer Assessment/Treatment  bed-chair transfer;chair-bed transfer  -AL           Bed-Chair Transfer    Bed-Chair [place] (Transfers)  moderate assist (50% patient effort)  -AL        Assistive Device (Bed-Chair Transfers)  walker, front-wheeled  -AL           Chair-Bed Transfer    Chair-Bed [place] (Transfers)  moderate assist (50% patient effort)  -AL        Assistive Device (Chair-Bed Transfers)  walker, front-wheeled  -AL           Sit-Stand Transfer    Sit-Stand [place] (Transfers)  minimum assist (75% patient effort)  -AL           ADL Assessment/Intervention    BADL Assessment/Intervention  upper body dressing;lower body dressing;toileting  -AL           Upper Body Dressing Assessment/Training    Upper Body Dressing [place] Level  moderate assist (50% patient effort)  -AL           Lower Body Dressing Assessment/Training    Lower Body Dressing [place] Level  maximum assist (25% patient effort)  -AL           Toileting Assessment/Training    [place] Level (Toileting)  maximum assist (25% patient effort)  -AL           BADL Safety/Performance    Impairments, BADL Safety/Performance  balance;endurance/activity tolerance;pain;strength;shortness of breath  -AL           General ROM    GENERAL ROM COMMENTS  shldr elevation impaired 50%, remaining joints wfl  -AL           MMT (Manual Muscle Testing)    General MMT Comments  shldrs 2+ to 3-/5;  remaining joints 3+ to 4-/5  -AL           Static Sitting Balance    Level of Dickens (Unsupported Sitting, Static Balance)  contact guard assist  -AL           Static Standing Balance    Level of [place] (Supported Standing, Static Balance)  moderate assist, 50 to 74% patient effort  -AL           Dynamic Standing Balance    Level of Dick[place], Reaches Outside Midline (Standing, Dynamic Balance)  moderate assist, 50 to 74% patient effort  -AL           Positioning and Restraints    Pre-Treatment Position  in bed  -AL        Post Treatment Position  bed  -AL        In Bed  notified nsg;supine;call light within reach;encouraged to call for assist;exit alarm on;with family/caregiver  -AL           Pain Assessment    Additional Documentation  Pain Scale: FACES Pre/Post-Treatment (Group)  -AL           Pain Scale: Numbers Pre/Post-Treatment    Pain Scale: Numbers, Pretreatment  9/10 abdominal pain  -AL        Pain Scale: Numbers, Post-Treatment  9/10  -AL           Wound 11/27/19 0800 Left lumbar spine Pressure Injury    Wound - Properties Group Date first assessed: 11/27/19  -CP Time first assessed: 0800  -CP Present on Hospital Admission: N  -CP Side: Left  -CP Location: lumbar spine  -CP Primary Wound Type: Pressure inj  -CP Stage, Pressure Injury: deep tissue injury  -CP       Wound 11/25/19 2045 Left forehead Other (comment)    Wound - Properties Group Date first assessed: 11/25/19  -BB Time first assessed: 2045  -BB Present on Hospital Admission: Y  -BB Side: Left  -BB Location: forehead  -BB Primary Wound Type: Other  -BB, bruise/hematoma        Wound 11/26/19 1722 Right hip Incision    Wound - Properties Group Date first assessed: 11/26/19  -NATALIE Time first assessed: 1722  -NATALIE Side: Right  -NATALIE Location: hip  -NATALIE Primary Wound Type: Incision  -NATALIE       Wound 12/12/19 1820 Right gluteal Pressure Injury    Wound - Properties Group Date first assessed: 12/12/19  -AD Time first assessed: 1820  -AD Present  on Hospital Admission: Y  -AD Side: Right  -AD Location: gluteal  -AD Primary Wound Type: Pressure inj  -AD Stage, Pressure Injury: unstageable  -AD       Plan of Care Review    Outcome Summary  pt presents w a decline from her baseline in adls/mobility, requiring up to mod a for mobility and max a for adls. pt having a lot of abdominal pain and is tolerating very little activity. she was not able to tolerate sitting up in the chair and required return to bed. she is weak and very unsteady on her feet, at high risk for falls. recommend ip rehab stay at [place].   -AL           Clinical Impression (OT)    Criteria for Skilled Therapeutic Interventions Met (OT Eval)  yes  -AL        Rehab Potential (OT Eval)  good, to achieve stated therapy goals  -AL        Therapy Frequency (OT Eval)  3 times/wk  -AL        Anticipated Discharge Disposition (OT)  -- ip rehab  -AL           Planned OT Interventions    Planned Therapy Interventions (OT Eval)  BADL retraining;activity tolerance training;transfer/mobility retraining  -AL        Activity Tolerance Training  tolerate 5 min standig activity w sba, ae/ad prn  -AL        BADL Retraining  ubd/ubb with setup/supervision; lbd/lbb with supervision, ae/ad prn  -AL        Transfer/Mobility Retraining  toilet/shower transfers w/ sba, ae/ad prn  -AL           Living Environment    Home Accessibility  wheelchair accessible  -AL          User Key  (r) = Recorded By, (t) = Taken By, (c) = Cosigned By    Initials Name Effective Dates    AL Constance Valencia, OT 03/01/19 -     Kendra Laughlin RN 03/01/19 -     CP Amber Alvarado RN 03/04/19 -     Estefani Murillo RN 03/01/19 -     AD Ashley Cyr RN 08/22/19 -                OT Recommendation and Plan  Outcome Summary/Treatment Plan (OT)  Anticipated Discharge Disposition (OT): (ip rehab)  Planned Therapy Interventions (OT Eval): BADL retraining, activity tolerance training, transfer/mobility retraining  Therapy Frequency (OT  Eval): 3 times/wk  Outcome Summary: pt presents w a decline from her baseline in adls/mobility, requiring up to mod a for mobility and max a for adls. pt having a lot of abdominal pain and is tolerating very little activity. she was not able to tolerate sitting up in the chair and required return to bed. she is weak and very unsteady on her feet, at high risk for falls. recommend ip rehab stay at [facility].         Time Calculation:   Time Calculation- OT     Row Name 12/14/19 1140             Time Calculation- OT    OT Start Time  0815  -AL      OT Stop Time  0840  -AL      OT Time Calculation (min)  25 min  -AL      Total Timed Code Minutes- OT  12 minute(s)  -AL      OT Received On  12/14/19  -AL      OT - Next Appointment  12/16/19  -AL      OT Goal Re-Cert Due Date  12/28/19  -AL        User Key  (r) = Recorded By, (t) = Taken By, (c) = Cosigned By    Initials Name Provider Type    AL Constance Valencia OT Occupational Therapist        Therapy Charges for Today     Code Description Service Date Service Provider Modifiers Qty    [id number]  OT SELF CARE/MGMT/TRAIN EA 15 MIN 12/14/2019 Constance Valencia OT GO 1    [id number] HC OT EVAL MOD COMPLEXITY 3 12/14/2019 Constance Valencia OT GO 1               Constance Valencia OT  12/14/2019

## 2019-12-14 NOTE — PROGRESS NOTES
"NEPHROLOGY PROGRESS NOTE------[facility]    [facility]  [phone]  John Canales MD      Patient Care Team:  Leonid Villeda Jr., MD as PCP - General  Leonid Villeda Jr., MD as PCP - Family Medicine  John Canales MD as Consulting Physician (Nephrology)      Provider:  John Canales MD  Patient Name: Sri Bobo  :  1960    SUBJECTIVE:  F/u HTN  No cp/soa  Feels better      Medication:    acetylcysteine 2 mL Nebulization BID - RT   albuterol 2.5 mg Nebulization BID - RT   aluminum-magnesium hydroxide-simethicone 15 mL Oral BID   amitriptyline 100 mg Oral Nightly   carvedilol 12.5 mg Oral BID With Meals   cloNIDine 1 patch Transdermal Weekly   clopidogrel 75 mg Oral Daily   cyanocobalamin 1,000 mcg Intramuscular Q28 Days   demeclocycline 300 mg Oral Q12H   famotidine 20 mg Oral Daily   heparin (porcine) 5,000 Units Subcutaneous Q12H   hydrALAZINE 25 mg Oral Q12H   levETIRAcetam 500 mg Oral Q12H   meropenem 500 mg Intravenous Q8H   miconazole  Topical Q12H   pharmacy 1 each Does not apply Once   polyethylene glycol 17 g Oral Daily   sodium chloride 10 mL Intravenous Q12H   sodium chloride 3 mL Nebulization TID - RT   sodium chloride 1 g Oral BID   sodium hypochlorite  Topical Q12H       sodium chloride 75 mL/hr Last Rate: 75 mL/hr (19)       OBJECTIVE    Vital Sign Min/Max for last 24 hours  Temp  Min: 97.2 °F (36.2 °C)  Max: 97.9 °F (36.6 °C)   BP  Min: 106/56  Max: 165/77   Pulse  Min: 82  Max: 94   Resp  Min: 16  Max: 20   SpO2  Min: 91 %  Max: 100 %   No data recorded   Weight  Min: 40.3 kg (88 lb 13.5 oz)  Max: 40.3 kg (88 lb 13.5 oz)     Flowsheet Rows      First Filed Value   Admission Height  157.5 cm (62\") Documented at 2019 09   Admission Weight  39.5 kg (87 lb 1.3 oz) Documented at 2019 0905          No intake/output data recorded.  I/O last 3 completed shifts:  In: 0   Out: 500 [Urine:500]    Physical Exam:  General " Appearance: alert, appears stated age and cooperative  Head: normocephalic, without obvious abnormality and atraumatic  Eyes: conjunctivae and sclerae normal and no icterus  Neck: supple and no JVD  Lungs: clear to auscultation and respirations regular  Heart: regular rhythm & normal rate and normal S1, S2  Chest: Wall no abnormalities observed  Abdomen: normal bowel sounds and soft non-tender  Extremities: moves extremities well, no edema, no cyanosis and no redness  Skin: no bleeding, bruising or rash, turgor normal, color normal and no lesions noted  Neurologic: Alert, and oriented. No focal deficits    Labs:    WBC WBC   Date Value Ref Range Status   12/14/2019 5.60 3.40 - 10.80 10*3/mm3 Final   12/13/2019 6.80 3.40 - 10.80 10*3/mm3 Final   12/12/2019 8.10 3.40 - 10.80 10*3/mm3 Final      HGB Hemoglobin   Date Value Ref Range Status   12/14/2019 7.9 (L) 12.0 - 15.9 g/dL Final   12/13/2019 8.9 (L) 12.0 - 15.9 g/dL Final   12/12/2019 8.1 (L) 12.0 - 15.9 g/dL Final      HCT Hematocrit   Date Value Ref Range Status   12/14/2019 23.7 (L) 34.0 - 46.6 % Final   12/13/2019 27.2 (L) 34.0 - 46.6 % Final   12/12/2019 25.5 (L) 34.0 - 46.6 % Final      Platlets No results found for: LABPLAT   MCV MCV   Date Value Ref Range Status   12/14/2019 92.3 79.0 - 97.0 fL Final   12/13/2019 91.8 79.0 - 97.0 fL Final   12/12/2019 91.5 79.0 - 97.0 fL Final          Sodium Sodium   Date Value Ref Range Status   12/14/2019 137 136 - 145 mmol/L Final   12/13/2019 136 136 - 145 mmol/L Final   12/12/2019 134 (L) 136 - 145 mmol/L Final      Potassium Potassium   Date Value Ref Range Status   12/14/2019 4.1 3.5 - 5.2 mmol/L Final   12/13/2019 3.7 3.5 - 5.2 mmol/L Final   12/12/2019 3.9 3.5 - 5.2 mmol/L Final      Chloride Chloride   Date Value Ref Range Status   12/14/2019 103 98 - 107 mmol/L Final   12/13/2019 96 (L) 98 - 107 mmol/L Final   12/12/2019 95 (L) 98 - 107 mmol/L Final      CO2 CO2   Date Value Ref Range Status   12/14/2019 26.0  22.0 - 29.0 mmol/L Final   12/13/2019 26.0 22.0 - 29.0 mmol/L Final   12/12/2019 26.0 22.0 - 29.0 mmol/L Final      BUN BUN   Date Value Ref Range Status   12/14/2019 30 (H) 8 - 23 mg/dL Final   12/13/2019 23 8 - 23 mg/dL Final   12/12/2019 36 (H) 8 - 23 mg/dL Final      Creatinine Creatinine   Date Value Ref Range Status   12/14/2019 0.57 0.57 - 1.00 mg/dL Final   12/13/2019 0.69 0.57 - 1.00 mg/dL Final   12/12/2019 0.74 0.57 - 1.00 mg/dL Final      Calcium Calcium   Date Value Ref Range Status   12/14/2019 7.9 (L) 8.6 - 10.5 mg/dL Final   12/13/2019 8.6 8.6 - 10.5 mg/dL Final   12/12/2019 8.7 8.6 - 10.5 mg/dL Final      PO4 No components found for: PO4   Albumin Albumin   Date Value Ref Range Status   12/14/2019 2.90 (L) 3.50 - 5.20 g/dL Final   12/13/2019 3.20 (L) 3.50 - 5.20 g/dL Final      Magnesium Magnesium   Date Value Ref Range Status   12/14/2019 1.7 1.6 - 2.4 mg/dL Final      Uric Acid No components found for: URIC ACID     Imaging Results (Last 72 Hours)     Procedure Component Value Units Date/Time    CT Abdomen Pelvis Without Contrast [[id number]] Collected:  12/12/19 1452     Updated:  12/12/19 1459    Narrative:       CT ABDOMEN PELVIS WO CONTRAST-     Date of Exam: 12/12/2019 2:45 PM     Indication: Abdominal pain, unspecified  . Constipation.     Comparison: None available.     Technique: Contiguous axial CT images were obtained from the lung bases  to the pubic symphysis without contrast. Sagittal and coronal  reconstructions were performed.  Automated exposure control and  iterative reconstruction methods were used.     FINDINGS:     Large colonic stool burden greatest in the cecum and ascending colon no  pneumatosis or free air or abscess.     Percutaneous gastrostomy tube in place.     No abnormally dilated small bowel loops are identified. Noncontrast  appearance of the liver, gallbladder, spleen, adrenals and kidneys is  within normal limits. Pancreas is moderately atrophic.     Dense airspace  disease changes are present in the right middle lobe and  right lower lobe. Noncalcified nodular densities are present in the left  lower lobe measuring 4 mm (image 6) and 7 mm (image 7), with mild left  basilar atelectasis. There is moderate cardiomegaly.     Old bilateral pubic body and right inferior pubic rami fractures. ORIF  changes of the right femur. Chronic appearing bilateral sacral ala  fractures. Chronic appearing fracture of L5 vertebral body with  compression. Grade 1-2 anterolisthesis L4 upon L5, approximately 13 mm,  thought to be related to severe facet arthropathy.          Impression:          1. Large colonic stool burden, most severe within the cecum and  ascending colon, consistent with the appearance of constipation.  2. No obstructing bowel abnormality is seen, and there is no evidence of  active bowel inflammation.  3. Dense right middle lobe and right lower lobe airspace disease.  Correlate clinically for pneumonia.  4. Small nodular densities in the left lower lobe are nonspecific but  favored to reflect benign infectious/inflammatory nodules.  5. Chronic appearing fractures of the pelvis and L5 vertebral body.  Right femur ORIF  6. Grade 1-2 anterolisthesis L5 upon S1 thought to be related to severe  facet arthropathy.           Electronically Signed By-Dr. Ashley Jerez MD On:12/12/2019 2:57 PM  This report was finalized on [id number] by Dr. Ashley Jerez MD.    XR Chest 2 View [[id number]] Collected:  12/12/19 1114     Updated:  12/12/19 1120    Narrative:       DATE OF EXAM:  12/12/2019 10:48 AM     PROCEDURE:  XR CHEST 2 VW-     INDICATIONS:  cough       COMPARISON:  CT chest 11/26/2019. AP portable chest 11/25/2019.     TECHNIQUE:   Two radiologic views of the chest.     FINDINGS:  Right lower lobe airspace disease persists, and appears fairly similar  to the 11/25/2019 examination. There is new ill-defined airspace disease  in the left lower lobe.     Stable cardiac  enlargement. Left chest wall luis catheter remains at  the cavoatrial junction. No visible pneumothorax. Surgical changes of  the right humerus. Degenerative changes of the left humerus. Chronic  appearing deformity of the distal left clavicle. Diffuse osteopenia.     There is a chronic appearing severe compression fracture of the T5  vertebral body, chronic appearing compression fracture of the superior  endplate of T4, corresponding to the CT chest from 11/26/2019. There is,  however, an age-indeterminate compression fracture of T8, new since  11/26/2019 CT chest.       Impression:          1. Persistent right lower lobe airspace disease worrisome for pneumonia.  New left lower lobe airspace disease worrisome for developing pneumonia.  2. Age indeterminate compression fracture of T8, new since 11/26/2019  comparison. Chronic T4 and T5 compression fractures.     Electronically Signed By-Dr. Ashley Jerez MD On:12/12/2019 11:18 AM  This report was finalized on [id number] by Dr. Ashley Jerez MD.    XR Spine Lumbar Complete 4+VW [[id number]] Collected:  12/12/19 1114     Updated:  12/12/19 1118    Narrative:       XR SPINE LUMBAR COMPLETE 4+VW-     Date of Exam: 12/12/2019 10:22 AM     Indication: Low back pain after fall.     Comparison Exams: September 6, 2017     Technique: 5 radiographs of the lumbar spine     FINDINGS:  No acute fracture is identified. There is generalized osteopenia. The  alignment appears stable, with grade 1/2 anterolisthesis of L4 on L5.  The vertebral body heights appear normal. There is moderate degenerative  loss of disc height at L4-5 and L5-S1. There is mild to moderate facet  arthropathy at L3-4 through L5-S1. The soft tissues are unremarkable.       Impression:       1.No acute fracture or traumatic subluxation of lumbar spine identified.  2.Degenerative changes as described above.     Electronically Signed By-DR. Jevon Dominguez MD On:12/12/2019 11:16 AM  This report was  finalized on [id number] by DR. Jevon Dominguez MD.          Results for orders placed during the hospital encounter of 12/12/19   XR Chest 2 View    Narrative DATE OF EXAM:  12/12/2019 10:48 AM     PROCEDURE:  XR CHEST 2 VW-     INDICATIONS:  cough       COMPARISON:  CT chest 11/26/2019. AP portable chest 11/25/2019.     TECHNIQUE:   Two radiologic views of the chest.     FINDINGS:  Right lower lobe airspace disease persists, and appears fairly similar  to the 11/25/2019 examination. There is new ill-defined airspace disease  in the left lower lobe.     Stable cardiac enlargement. Left chest wall luis catheter remains at  the cavoatrial junction. No visible pneumothorax. Surgical changes of  the right humerus. Degenerative changes of the left humerus. Chronic  appearing deformity of the distal left clavicle. Diffuse osteopenia.     There is a chronic appearing severe compression fracture of the T5  vertebral body, chronic appearing compression fracture of the superior  endplate of T4, corresponding to the CT chest from 11/26/2019. There is,  however, an age-indeterminate compression fracture of T8, new since  11/26/2019 CT chest.       Impression    1. Persistent right lower lobe airspace disease worrisome for pneumonia.  New left lower lobe airspace disease worrisome for developing pneumonia.  2. Age indeterminate compression fracture of T8, new since 11/26/2019  comparison. Chronic T4 and T5 compression fractures.     Electronically Signed By-Dr. Ashley Jerez MD On:12/12/2019 11:18 AM  This report was finalized on [id number] by Dr. Ashley Jerez MD.   XR Spine Lumbar Complete 4+VW    Narrative XR SPINE LUMBAR COMPLETE 4+VW-     Date of Exam: 12/12/2019 10:22 AM     Indication: Low back pain after fall.     Comparison Exams: September 6, 2017     Technique: 5 radiographs of the lumbar spine     FINDINGS:  No acute fracture is identified. There is generalized osteopenia. The  alignment appears stable,  with grade 1/2 anterolisthesis of L4 on L5.  The vertebral body heights appear normal. There is moderate degenerative  loss of disc height at L4-5 and L5-S1. There is mild to moderate facet  arthropathy at L3-4 through L5-S1. The soft tissues are unremarkable.       Impression 1.No acute fracture or traumatic subluxation of lumbar spine identified.  2.Degenerative changes as described above.     Electronically Signed By-DR. Jevon Dominguez MD On:12/12/2019 11:16 AM  This report was finalized on [id number] by DR. Jevon Dominguez MD.              ASSESSMENT / PLAN      Pneumonia due to infectious organism    Dysphagia, pharyngoesophageal phase    Decubitus ulcer of ischial area, right, stage IV (CMS/HCC)    Closed displaced fracture of acromial end of left clavicle    · Hypertension--better. Clonidine patch was applied.   · Hyponatremia, mild--probably due to SIADH. Its better. On demeclocycline  · Hx tonsillar cancer  · Nutrition--to have G-tube placed  · PNA--on ATBx     BP bit low, stop hydralazine  Controlled well on clonidine patch    John Canales MD  [facility]  [phone]  12/14/19  8:08 AM

## 2019-12-14 NOTE — PROGRESS NOTES
RT Note:  Patient assessed at 0413 patient shows no signs of anxiety or respiratory distress at this time. Patient is pink spo2 97% HR 87, RR 20. While patient is sleeping she is inhaling through her mouth. Upon inhalation she is creating high pitched sound similar to stridor. Patient is calm and resting with ankles crossed this sound is passive inhalation and I suspect it is air passing around an anatomical feature. Patient received 1 racemic epinephrine treatment prior to this evaluation with no change. Patient bilateral breath sounds inspiratory and expiratory diminished/ expiratory rhonchi.

## 2019-12-15 LAB
ALBUMIN SERPL-MCNC: 2.9 G/DL (ref 3.5–5.2)
ANION GAP SERPL CALCULATED.3IONS-SCNC: 5 MMOL/L (ref 5–15)
BASOPHILS # BLD AUTO: 0 10*3/MM3 (ref 0–0.2)
BASOPHILS NFR BLD AUTO: 0.5 % (ref 0–1.5)
BUN BLD-MCNC: 16 MG/DL (ref 8–23)
BUN/CREAT SERPL: 33.3 (ref 7–25)
CALCIUM SPEC-SCNC: 8.6 MG/DL (ref 8.6–10.5)
CHLORIDE SERPL-SCNC: 103 MMOL/L (ref 98–107)
CO2 SERPL-SCNC: 28 MMOL/L (ref 22–29)
CREAT BLD-MCNC: 0.48 MG/DL (ref 0.57–1)
DEPRECATED RDW RBC AUTO: 51.6 FL (ref 37–54)
EOSINOPHIL # BLD AUTO: 0.2 10*3/MM3 (ref 0–0.4)
EOSINOPHIL NFR BLD AUTO: 3 % (ref 0.3–6.2)
ERYTHROCYTE [DISTWIDTH] IN BLOOD BY AUTOMATED COUNT: 15.6 % (ref 12.3–15.4)
GFR SERPL CREATININE-BSD FRML MDRD: 132 ML/MIN/1.73
GLUCOSE BLD-MCNC: 97 MG/DL (ref 65–99)
HCT VFR BLD AUTO: 23.5 % (ref 34–46.6)
HGB BLD-MCNC: 7.9 G/DL (ref 12–15.9)
LYMPHOCYTES # BLD AUTO: 0.7 10*3/MM3 (ref 0.7–3.1)
LYMPHOCYTES NFR BLD AUTO: 11.9 % (ref 19.6–45.3)
MCH RBC QN AUTO: 30.9 PG (ref 26.6–33)
MCHC RBC AUTO-ENTMCNC: 33.5 G/DL (ref 31.5–35.7)
MCV RBC AUTO: 92.2 FL (ref 79–97)
MONOCYTES # BLD AUTO: 0.7 10*3/MM3 (ref 0.1–0.9)
MONOCYTES NFR BLD AUTO: 11.3 % (ref 5–12)
NEUTROPHILS # BLD AUTO: 4.5 10*3/MM3 (ref 1.7–7)
NEUTROPHILS NFR BLD AUTO: 73.3 % (ref 42.7–76)
NRBC BLD AUTO-RTO: 0 /100 WBC (ref 0–0.2)
PHOSPHATE SERPL-MCNC: 2 MG/DL (ref 2.5–4.5)
PLATELET # BLD AUTO: 323 10*3/MM3 (ref 140–450)
PMV BLD AUTO: 7 FL (ref 6–12)
POTASSIUM BLD-SCNC: 4 MMOL/L (ref 3.5–5.2)
RBC # BLD AUTO: 2.55 10*6/MM3 (ref 3.77–5.28)
SODIUM BLD-SCNC: 136 MMOL/L (ref 136–145)
WBC NRBC COR # BLD: 6.2 10*3/MM3 (ref 3.4–10.8)

## 2019-12-15 PROCEDURE — 94799 UNLISTED PULMONARY SVC/PX: CPT

## 2019-12-15 PROCEDURE — 80069 RENAL FUNCTION PANEL: CPT | Performed by: INTERNAL MEDICINE

## 2019-12-15 PROCEDURE — 25010000002 HEPARIN (PORCINE) PER 1000 UNITS: Performed by: FAMILY MEDICINE

## 2019-12-15 PROCEDURE — 25010000002 MORPHINE PER 10 MG: Performed by: FAMILY MEDICINE

## 2019-12-15 PROCEDURE — 85025 COMPLETE CBC W/AUTO DIFF WBC: CPT | Performed by: INTERNAL MEDICINE

## 2019-12-15 PROCEDURE — 25010000002 MEROPENEM PER 100 MG: Performed by: FAMILY MEDICINE

## 2019-12-15 PROCEDURE — 99232 SBSQ HOSP IP/OBS MODERATE 35: CPT | Performed by: INTERNAL MEDICINE

## 2019-12-15 RX ORDER — ESCITALOPRAM OXALATE 10 MG/1
10 TABLET ORAL DAILY
Status: DISCONTINUED | OUTPATIENT
Start: 2019-12-15 | End: 2019-12-18 | Stop reason: HOSPADM

## 2019-12-15 RX ADMIN — HEPARIN SODIUM 5000 UNITS: 5000 INJECTION INTRAVENOUS; SUBCUTANEOUS at 21:33

## 2019-12-15 RX ADMIN — LEVETIRACETAM 500 MG: 500 SOLUTION ORAL at 08:46

## 2019-12-15 RX ADMIN — ESCITALOPRAM 10 MG: 10 TABLET, FILM COATED ORAL at 13:50

## 2019-12-15 RX ADMIN — ALUMINUM HYDROXIDE, MAGNESIUM HYDROXIDE, AND DIMETHICONE 15 ML: 400; 400; 40 SUSPENSION ORAL at 21:34

## 2019-12-15 RX ADMIN — AMITRIPTYLINE HYDROCHLORIDE 100 MG: 50 TABLET, FILM COATED ORAL at 21:33

## 2019-12-15 RX ADMIN — Medication 10 ML: at 08:50

## 2019-12-15 RX ADMIN — LACTULOSE: 10 SOLUTION ORAL at 08:46

## 2019-12-15 RX ADMIN — POLYETHYLENE GLYCOL 3350 17 G: 17 POWDER, FOR SOLUTION ORAL at 08:45

## 2019-12-15 RX ADMIN — SODIUM CHLORIDE 75 ML/HR: 900 INJECTION, SOLUTION INTRAVENOUS at 00:34

## 2019-12-15 RX ADMIN — SODIUM CHLORIDE TAB 1 GM 1 G: 1 TAB at 21:33

## 2019-12-15 RX ADMIN — LEVETIRACETAM 500 MG: 500 SOLUTION ORAL at 21:34

## 2019-12-15 RX ADMIN — CARVEDILOL 12.5 MG: 6.25 TABLET, FILM COATED ORAL at 18:20

## 2019-12-15 RX ADMIN — ACETYLCYSTEINE 4 ML: 200 SOLUTION ORAL; RESPIRATORY (INHALATION) at 18:32

## 2019-12-15 RX ADMIN — MEROPENEM 500 MG: 500 INJECTION, POWDER, FOR SOLUTION INTRAVENOUS at 00:33

## 2019-12-15 RX ADMIN — MEROPENEM 500 MG: 500 INJECTION, POWDER, FOR SOLUTION INTRAVENOUS at 08:45

## 2019-12-15 RX ADMIN — CLOPIDOGREL BISULFATE 75 MG: 75 TABLET ORAL at 08:46

## 2019-12-15 RX ADMIN — MORPHINE SULFATE 4 MG: 4 INJECTION INTRAVENOUS at 05:10

## 2019-12-15 RX ADMIN — OXYCODONE HYDROCHLORIDE 10 MG: 5 TABLET ORAL at 00:33

## 2019-12-15 RX ADMIN — Medication 1 PACKET: at 15:10

## 2019-12-15 RX ADMIN — OXYCODONE HYDROCHLORIDE 10 MG: 5 TABLET ORAL at 15:10

## 2019-12-15 RX ADMIN — Medication 10 ML: at 21:49

## 2019-12-15 RX ADMIN — MICONAZOLE NITRATE: 20 POWDER TOPICAL at 08:50

## 2019-12-15 RX ADMIN — SODIUM CHLORIDE 75 ML/HR: 900 INJECTION, SOLUTION INTRAVENOUS at 13:52

## 2019-12-15 RX ADMIN — CARVEDILOL 12.5 MG: 6.25 TABLET, FILM COATED ORAL at 08:47

## 2019-12-15 RX ADMIN — DAKIN'S SOLUTION 0.125% (QUARTER STRENGTH): 0.12 SOLUTION at 11:10

## 2019-12-15 RX ADMIN — Medication 1 PACKET: at 08:52

## 2019-12-15 RX ADMIN — OXYCODONE HYDROCHLORIDE 10 MG: 5 TABLET ORAL at 21:34

## 2019-12-15 RX ADMIN — ALUMINUM HYDROXIDE, MAGNESIUM HYDROXIDE, AND DIMETHICONE: 400; 400; 40 SUSPENSION ORAL at 08:45

## 2019-12-15 RX ADMIN — ALBUTEROL SULFATE 2.5 MG: 2.5 SOLUTION RESPIRATORY (INHALATION) at 18:32

## 2019-12-15 RX ADMIN — Medication 1 PACKET: at 21:49

## 2019-12-15 RX ADMIN — DAKIN'S SOLUTION 0.125% (QUARTER STRENGTH): 0.12 SOLUTION at 21:35

## 2019-12-15 RX ADMIN — OXYCODONE HYDROCHLORIDE 10 MG: 5 TABLET ORAL at 08:46

## 2019-12-15 RX ADMIN — DEMECLOCYCLINE HYDROCHLORIDE 300 MG: 150 TABLET, FILM COATED ORAL at 21:33

## 2019-12-15 RX ADMIN — ACETYLCYSTEINE 4 ML: 200 SOLUTION ORAL; RESPIRATORY (INHALATION) at 07:09

## 2019-12-15 RX ADMIN — ALBUTEROL SULFATE 2.5 MG: 2.5 SOLUTION RESPIRATORY (INHALATION) at 07:08

## 2019-12-15 RX ADMIN — FAMOTIDINE 20 MG: 20 TABLET, FILM COATED ORAL at 08:47

## 2019-12-15 RX ADMIN — MEROPENEM 500 MG: 500 INJECTION, POWDER, FOR SOLUTION INTRAVENOUS at 15:10

## 2019-12-15 RX ADMIN — HEPARIN SODIUM 5000 UNITS: 5000 INJECTION INTRAVENOUS; SUBCUTANEOUS at 08:46

## 2019-12-15 RX ADMIN — SODIUM CHLORIDE TAB 1 GM 1 G: 1 TAB at 08:47

## 2019-12-15 RX ADMIN — DEMECLOCYCLINE HYDROCHLORIDE 300 MG: 150 TABLET, FILM COATED ORAL at 08:44

## 2019-12-15 NOTE — PROGRESS NOTES
Daily Progress Note        Pneumonia due to infectious organism    Dysphagia, pharyngoesophageal phase    Decubitus ulcer of ischial area, right, stage IV (CMS/HCC)    Closed displaced fracture of acromial end of left clavicle      Assessment     rhino virus positive     CT chest  showed  Persistent consolidation right lower lobe and mild Bronchiectasis,  Overall  Compatible with chronic aspiration and not indicative of active pneumonia    Patient currently on room air denies any shortness of breath from baseline     Sputum cultures mixed respiratory suzy    Wound culture showed light growth of E. coli ESBL and enterococcus species      past medical history significant for:  bronchoscopy 03/27/2019 showed E coli with ESBL treated with meropenem  bronchoscopy 01/25/2019 AFB cultures negative  CT scan 01/17/2019 showed right lower lobe obstruction with mucus plug or tumor   bronchoscopy July 2018 MRSA pneumonia  Right upper lobe nodules,  CT scan of the chest  08/24/2018 compared to 07/26/2018 just showed decreased size  favoring infectious process  Status post a fall and right wrist fracture  Patient was treated recently for MRSA  Right lower lobe pneumonia with IV vancomycin  CT scan of the chest 07/26/2018 showed 2 right upper lobe lung nodules suspicious for malignancy     Bronchoscopy 07/27/2018 for coughing up blood however there was No evidence of active hemoptysis  Moderate to severe mucopurulent secretions were noted in the right lower lobe and right upper lobe  FNA from RLL negative cytology, BAL Rt lung negative cytology  History of left tonsillar cancer in 2013, status post PEG tube  Status post radiation     Status post EGD 10/03/2017 no obvious source of bleeding  BRONCHOSCOPY 10/05/2017, no hemoptysis, No endobronchial lesions  Chronic inflammatory changes cultures were positive for group B streptococcal pneumonia     seizure disorder  Osteoporosis  Peripheral vascular disease  Protein calorie  malnutrition        Plan  Antibiotics  Currently on  meropenem     Mucomyst nebulized mixed with albuterol                      LOS: 1 day     Subjective         Objective     Vital signs for last 24 hours:  Vitals:    12/14/19 1900 12/14/19 1910 12/14/19 1930 12/14/19 1939   BP:       BP Location:       Patient Position:       Pulse:  86 87 85   Resp: 16 16 16 16   Temp:       TempSrc:       SpO2:  91% 92% 93%   Weight:       Height:           Intake/Output last 3 shifts:  I/O last 3 completed shifts:  In: 0   Out: 2450 [Urine:2450]  Intake/Output this shift:  No intake/output data recorded.      Radiology  Imaging Results (Last 24 Hours)     ** No results found for the last 24 hours. **          Labs:  Results from last 7 days   Lab Units 12/14/19  0611   WBC 10*3/mm3 5.60   HEMOGLOBIN g/dL 7.9*   HEMATOCRIT % 23.7*   PLATELETS 10*3/mm3 307     Results from last 7 days   Lab Units 12/14/19  0611 12/13/19  0334   SODIUM mmol/L 137 136   POTASSIUM mmol/L 4.1 3.7   CHLORIDE mmol/L 103 96*   CO2 mmol/L 26.0 26.0   BUN mg/dL 30* 23   CREATININE mg/dL 0.57 0.69   CALCIUM mg/dL 7.9* 8.6   BILIRUBIN mg/dL  --  0.4   ALK PHOS U/L  --  85   ALT (SGPT) U/L  --  8   AST (SGOT) U/L  --  14   GLUCOSE mg/dL 99 89         Results from last 7 days   Lab Units 12/14/19  0611 12/13/19  0334   ALBUMIN g/dL 2.90* 3.20*             Results from last 7 days   Lab Units 12/14/19  0611   MAGNESIUM mg/dL 1.7                   Meds:   SCHEDULE    acetylcysteine 2 mL Nebulization BID - RT   albuterol 2.5 mg Nebulization BID - RT   aluminum-magnesium hydroxide-simethicone 15 mL Oral BID   amitriptyline 100 mg Oral Nightly   BENEPROTEIN 1 packet Per G Tube TID   carvedilol 12.5 mg Oral BID With Meals   cloNIDine 1 patch Transdermal Weekly   clopidogrel 75 mg Oral Daily   cyanocobalamin 1,000 mcg Intramuscular Q28 Days   demeclocycline 300 mg Oral Q12H   famotidine 20 mg Oral Daily   heparin (porcine) 5,000 Units Subcutaneous Q12H    levETIRAcetam 500 mg Oral Q12H   meropenem 500 mg Intravenous Q8H   miconazole  Topical Q12H   pharmacy 1 each Does not apply Once   polyethylene glycol 17 g Oral Daily   sodium chloride 10 mL Intravenous Q12H   sodium chloride 3 mL Nebulization TID - RT   sodium chloride 1 g Oral BID   sodium hypochlorite  Topical Q12H     Infusions    sodium chloride 75 mL/hr Last Rate: 75 mL/hr (12/13/19 2120)     PRNs  albuterol sulfate HFA  •  calcium carbonate  •  ipratropium-albuterol  •  labetalol  •  lactulose  •  magnesium hydroxide  •  Morphine **OR** Morphine  •  ondansetron  •  oxyCODONE  •  [COMPLETED] Insert peripheral IV **AND** sodium chloride  •  sodium chloride    Physical Exam:  Physical Exam   Cardiovascular:   Murmur heard.  Pulmonary/Chest: No respiratory distress. She has wheezes. She has rales. She exhibits no tenderness.   Abdominal: She exhibits no distension. There is no tenderness.   Musculoskeletal: She exhibits no edema.       ROS  Review of Systems   HENT: Positive for congestion, rhinorrhea and sneezing.    Respiratory: Positive for cough. Negative for apnea, choking, chest tightness, shortness of breath and wheezing.    Cardiovascular: Negative for leg swelling.             Total time spent with patient greater than: 1 Hour

## 2019-12-15 NOTE — PROGRESS NOTES
[facility] Medicine Services Daily Progress Note      Hospitalist Team  LOS 2 days      Patient Care Team:  Leonid Villeda Jr., MD as PCP - General  Leonid Villeda Jr., MD as PCP - Family Medicine  John Canales MD as Consulting Physician (Nephrology)    Patient Location: 2107/1      Subjective   Subjective     Chief Complaint / Subjective  Chief Complaint   Patient presents with   • Back Pain       Present on Admission:  • Pneumonia due to infectious organism  • Decubitus ulcer of ischial area, right, stage IV (CMS/HCC)  • Dysphagia, pharyngoesophageal phase  • Closed displaced fracture of acromial end of left clavicle      Brief Synopsis of Hospital Course/HPI  The patient is a 59-year-old white female who was admitted to hospital with weakness and confusion.     The patient was just discharged from the hospital on November 30, 2019.  The patient was in the hospital at that time because of pneumonia pressure sore of the sacrun and fracture of the left clavicle.     Arrangements were made for the patient to receive IV antibiotics on discharge to complete the treatment for her pneumonia and a sacral ulcer.  She was also to take amoxicillin by mouth or by G-tube.  Home health and home physical therapy were arranged.     Patient has a history of tonsillar cancer.  The patient has had treatment for this.  The patient has severe oropharyngeal dysphagia as a result of this.  The patient is fed by G-tube.  She requires assistance for care at home.  She currently lives at home with her  who is a paraplegic.  They rely heavily on her daughter who is an RN for care.  Patient's daughter however states that she cannot meet the needs her mother at this time.     The patient's  states that she was complaining of abdominal pain today.  When he examined her abdominal wall area her gastrostomy tube had been pulled out.  He placed a Valderrama catheter into the gastrostomy tube site.  He  "brought her to the emergency room.     The member states that the patient has been confused since she has been home.    Date:: 12/14/2019 patient seen and examined and seemed to doing fairly well.  Denies any complaints.    Date 12/15/2019: Patient seen and examined and patient seemed to doing fairly well and denying complaints.    Review of Systems   All other systems reviewed and are negative.        Objective   Objective      Vital Signs  Temp:  [97.3 °F (36.3 °C)-98.4 °F (36.9 °C)] 98.4 °F (36.9 °C)  Heart Rate:  [77-88] 78  Resp:  [12-22] 12  BP: (110-188)/() 154/85  Oxygen Therapy  SpO2: 95 %  Pulse Oximetry Type: Continuous  Device (Oxygen Therapy): room air  Flow (L/min): 3  Oxygen Concentration (%): 21  Flowsheet Rows      First Filed Value   Admission Height  157.5 cm (62\") Documented at 12/12/2019 0905   Admission Weight  39.5 kg (87 lb 1.3 oz) Documented at 12/12/2019 0905        Intake & Output (last 3 days)       12/12 0701 - 12/13 0700 12/13 0701 - 12/14 0700 12/14 0701 - 12/15 0700 12/15 0701 - 12/16 0700    P.O. 0 0 0     Other    250    NG/GT    200    Total Intake(mL/kg) 0 (0) 0 (0) 0 (0) 450 (10.7)    Urine (mL/kg/hr) 400 500 (0.5) 2250 (2.2)     Total Output      Net -400 -500 -2250 +450            Stool Unmeasured Occurrence    2 x        Lines, Drains & Airways    Active LDAs     Name:   Placement date:   Placement time:   Site:   Days:    Gastrostomy/Enterostomy Umbilicus   07/28/19    0000    Umbilicus   139    Single Lumen Implantable Port 11/25/19 Left Chest   11/25/19    1613    Chest   19                  Physical Exam:    Physical Exam   Constitutional: No distress.   HENT:   Head: Normocephalic and atraumatic.   Mouth/Throat: No oropharyngeal exudate.   Eyes: Right eye exhibits no discharge. Left eye exhibits no discharge. No scleral icterus.   Neck: No JVD present. No tracheal deviation present. No thyromegaly present.   Cardiovascular: Normal rate, regular rhythm, " normal heart sounds and intact distal pulses. Exam reveals no gallop and no friction rub.   No murmur heard.  Pulmonary/Chest: Effort normal and breath sounds normal. No stridor. No respiratory distress. She has no wheezes. She has no rales. She exhibits no tenderness.   Abdominal: Soft. Bowel sounds are normal. She exhibits no distension and no mass. There is no tenderness. There is no rebound and no guarding. No hernia.   Musculoskeletal: Normal range of motion. She exhibits no edema, tenderness or deformity.   Lymphadenopathy:     She has no cervical adenopathy.   Neurological: She is alert. No cranial nerve deficit or sensory deficit. She exhibits normal muscle tone. Coordination normal.   Skin: Skin is warm and dry. No rash noted. She is not diaphoretic. No erythema.   Nursing note and vitals reviewed.        Procedures:      Results Review:     I reviewed the patient's new clinical results.      Lab Results (last 24 hours)     Procedure Component Value Units Date/Time    Blood Culture - Blood, Arm, Right [[id number]] Collected:  12/12/19 1212    Specimen:  Blood from Arm, Right Updated:  12/15/19 1230     Blood Culture No growth at 3 days    Blood Culture - Blood, Blood, Venous Line [[id number]] Collected:  12/12/19 1157    Specimen:  Blood, Venous Line Updated:  12/15/19 1215     Blood Culture No growth at 3 days    Renal Function Panel [[id number]]  (Abnormal) Collected:  12/15/19 0519    Specimen:  Blood Updated:  12/15/19 0558     Glucose 97 mg/dL      BUN 16 mg/dL      Creatinine 0.48 mg/dL      Sodium 136 mmol/L      Potassium 4.0 mmol/L      Chloride 103 mmol/L      CO2 28.0 mmol/L      Calcium 8.6 mg/dL      Albumin 2.90 g/dL      Phosphorus 2.0 mg/dL      Anion Gap 5.0 mmol/L      BUN/Creatinine Ratio 33.3     eGFR Non African Amer 132 mL/min/1.73     Narrative:       GFR Normal >60  Chronic Kidney Disease <60  Kidney Failure <15      CBC & Differential [[id number]] Collected:  12/15/19 0519    Specimen:   Blood Updated:  12/15/19 0529    Narrative:       The following orders were created for panel order CBC & Differential.  Procedure                               Abnormality         Status                     ---------                               -----------         ------                     CBC Auto Differential[[id number]]        Abnormal            Final result                 Please view results for these tests on the individual orders.    CBC Auto Differential [[id number]]  (Abnormal) Collected:  12/15/19 0519    Specimen:  Blood Updated:  12/15/19 0529     WBC 6.20 10*3/mm3      RBC 2.55 10*6/mm3      Hemoglobin 7.9 g/dL      Hematocrit 23.5 %      MCV 92.2 fL      MCH 30.9 pg      MCHC 33.5 g/dL      RDW 15.6 %      RDW-SD 51.6 fl      MPV 7.0 fL      Platelets 323 10*3/mm3      Neutrophil % 73.3 %      Lymphocyte % 11.9 %      Monocyte % 11.3 %      Eosinophil % 3.0 %      Basophil % 0.5 %      Neutrophils, Absolute 4.50 10*3/mm3      Lymphocytes, Absolute 0.70 10*3/mm3      Monocytes, Absolute 0.70 10*3/mm3      Eosinophils, Absolute 0.20 10*3/mm3      Basophils, Absolute 0.00 10*3/mm3      nRBC 0.0 /100 WBC         No results found for: HGBA1C                Microbiology Results (last 10 days)     Procedure Component Value - Date/Time    Blood Culture - Blood, Arm, Right [[id number]] Collected:  12/12/19 1212    Lab Status:  Preliminary result Specimen:  Blood from Arm, Right Updated:  12/15/19 1230     Blood Culture No growth at 3 days    Respiratory Panel, PCR - Swab, Nasopharynx [[id number]]  (Abnormal) Collected:  12/12/19 1204    Lab Status:  Final result Specimen:  Swab from Nasopharynx Updated:  12/12/19 1323     ADENOVIRUS, PCR Not Detected     Coronavirus 229E Not Detected     Coronavirus HKU1 Not Detected     Coronavirus NL63 Not Detected     Coronavirus OC43 Not Detected     Human Metapneumovirus Not Detected     Human Rhinovirus/Enterovirus Detected     Influenza B PCR Not Detected      Parainfluenza Virus 1 Not Detected     Parainfluenza Virus 2 Not Detected     Parainfluenza Virus 3 Not Detected     Parainfluenza Virus 4 Not Detected     Bordetella pertussis pcr Not Detected     Influenza A H1 2009 PCR Not Detected     Chlamydophila pneumoniae PCR Not Detected     Mycoplasma pneumo by PCR Not Detected     Influenza A PCR Not Detected     Influenza A H3 Not Detected     Influenza A H1 Not Detected     RSV, PCR Not Detected    Blood Culture - Blood, Blood, Venous Line [[id number]] Collected:  12/12/19 1157    Lab Status:  Preliminary result Specimen:  Blood, Venous Line Updated:  12/15/19 1215     Blood Culture No growth at 3 days          ECG/EMG Results (most recent)     None               Results for orders placed during the hospital encounter of 07/28/19   Adult Transthoracic Echo Complete W/ Cont if Necessary Per Protocol    Narrative · Estimated EF = 65%.  · Left ventricular systolic function is normal.  · Left ventricular diastolic dysfunction (grade I) consistent with   impaired relaxation.  · Mild mitral valve regurgitation is present  · Mild tricuspid valve regurgitation is present.  · Technically difficult study limited views were obtained most probably   preserved LV systolic function without any significant Doppler   abnormalities          Ct Abdomen Pelvis Without Contrast    Result Date: 12/12/2019   1. Large colonic stool burden, most severe within the cecum and ascending colon, consistent with the appearance of constipation. 2. No obstructing bowel abnormality is seen, and there is no evidence of active bowel inflammation. 3. Dense right middle lobe and right lower lobe airspace disease. Correlate clinically for pneumonia. 4. Small nodular densities in the left lower lobe are nonspecific but favored to reflect benign infectious/inflammatory nodules. 5. Chronic appearing fractures of the pelvis and L5 vertebral body. Right femur ORIF 6. Grade 1-2 anterolisthesis L5 upon S1 thought to  be related to severe facet arthropathy.    Electronically Signed By-Dr. Ashley Jerez MD On:12/12/2019 2:57 PM This report was finalized on [id number] by Dr. Ashley Jerez MD.    Xr Chest 2 View    Result Date: 12/12/2019   1. Persistent right lower lobe airspace disease worrisome for pneumonia. New left lower lobe airspace disease worrisome for developing pneumonia. 2. Age indeterminate compression fracture of T8, new since 11/26/2019 comparison. Chronic T4 and T5 compression fractures.  Electronically Signed By-Dr. Ashley Jerez MD On:12/12/2019 11:18 AM This report was finalized on [id number] by Dr. Ashley Jerez MD.    Xr Spine Lumbar Complete 4+vw    Result Date: 12/12/2019  1.No acute fracture or traumatic subluxation of lumbar spine identified. 2.Degenerative changes as described above.  Electronically Signed By-DR. Jevon Dominguez MD On:12/12/2019 11:16 AM This report was finalized on [id number] by DR. Jevon Dominguez MD.      Xrays, labs reviewed personally by physician.    Medication Review:   I have reviewed the patient's current medication list      Scheduled Meds    acetylcysteine 2 mL Nebulization BID - RT   albuterol 2.5 mg Nebulization BID - RT   aluminum-magnesium hydroxide-simethicone 15 mL Oral BID   amitriptyline 100 mg Oral Nightly   BENEPROTEIN 1 packet Per G Tube TID   carvedilol 12.5 mg Oral BID With Meals   cloNIDine 1 patch Transdermal Weekly   clopidogrel 75 mg Oral Daily   cyanocobalamin 1,000 mcg Intramuscular Q28 Days   demeclocycline 300 mg Oral Q12H   escitalopram 10 mg Oral Daily   famotidine 20 mg Oral Daily   heparin (porcine) 5,000 Units Subcutaneous Q12H   levETIRAcetam 500 mg Oral Q12H   meropenem 500 mg Intravenous Q8H   miconazole  Topical Q12H   pharmacy 1 each Does not apply Once   polyethylene glycol 17 g Oral Daily   sodium chloride 10 mL Intravenous Q12H   sodium chloride 1 g Oral BID   sodium hypochlorite  Topical Q12H       Meds Infusions       Meds  PRN  albuterol sulfate HFA  •  calcium carbonate  •  ipratropium-albuterol  •  labetalol  •  lactulose  •  magnesium hydroxide  •  ondansetron  •  oxyCODONE  •  [COMPLETED] Insert peripheral IV **AND** sodium chloride  •  sodium chloride      Assessment/Plan   Assessment/Plan     Active Hospital Problems    Diagnosis  POA   • **Pneumonia due to infectious organism [J18.9]  Yes   • Closed displaced fracture of acromial end of left clavicle [S42.032A]  Yes   • Decubitus ulcer of ischial area, right, stage IV (CMS/HCC) [L89.314]  Yes   • Dysphagia, pharyngoesophageal phase [R13.14]  Yes      Resolved Hospital Problems   No resolved problems to display.       Principal Problem:  Pneumonia due to rhinovirus possible chronic aspiration  -Patient is on meropenem/demeclocycline     Active Problems:    Dysphagia, pharyngoesophageal phase-unchanged-patient will be kept n.p.o.  Gastrostomy tube will be replaced.       Decubitus ulcer of ischial area, right, stage IV (CMS/HCC) and unchanged-obesity patient will be seen in consultation by wound therapy once again.       Closed displaced fracture of acromial end of left clavicle stable        Hypertension-patient's blood pressures been elevated since being in the hospital.  She will be placed on a Cardene drip and seen by nephrology.      Waiting for pre-CERT      VTE Prophylaxis - heparin.      Code Status -   Code Status and Medical Interventions:   Ordered at: 12/12/19 6011     Level Of Support Discussed With:    Patient     Code Status:    CPR     Medical Interventions (Level of Support Prior to Arrest):    Full       Discharge Planning    Destination      Service Provider Request Status Selected Services Address Phone Number Fax Number    [facility] Pending - Request Sent N/A [address] [place] [address] [phone]    [facility] Declined  No Bed Available N/A [address] [facility] [place]  [address] [phone]      Durable Medical Equipment      Coordination has not been started for this encounter.      Dialysis/Infusion      Coordination has not been started for this encounter.      Home Medical Care      Coordination has not been started for this encounter.      Therapy      Coordination has not been started for this encounter.      Community Resources      Coordination has not been started for this encounter.            Electronically signed by Ugo Cedillo MD, 12/15/19, 4:50 PM.  [facility] Hospitalist Team

## 2019-12-15 NOTE — PLAN OF CARE
Started lexapro; o2 off and on all day; large bm with lactulose; reports abdomen feels better after bm.  Family updated; awaiting precerts for d/c to rehab.

## 2019-12-16 ENCOUNTER — APPOINTMENT (OUTPATIENT)
Dept: WOUND CARE | Facility: HOSPITAL | Age: 59
End: 2019-12-16

## 2019-12-16 LAB
ALBUMIN SERPL-MCNC: 2.8 G/DL (ref 3.5–5.2)
ANION GAP SERPL CALCULATED.3IONS-SCNC: 7 MMOL/L (ref 5–15)
BASOPHILS # BLD AUTO: 0 10*3/MM3 (ref 0–0.2)
BASOPHILS NFR BLD AUTO: 0.4 % (ref 0–1.5)
BUN BLD-MCNC: 21 MG/DL (ref 8–23)
BUN/CREAT SERPL: 33.9 (ref 7–25)
CALCIUM SPEC-SCNC: 8.5 MG/DL (ref 8.6–10.5)
CHLORIDE SERPL-SCNC: 97 MMOL/L (ref 98–107)
CO2 SERPL-SCNC: 27 MMOL/L (ref 22–29)
CREAT BLD-MCNC: 0.62 MG/DL (ref 0.57–1)
DEPRECATED RDW RBC AUTO: 49.9 FL (ref 37–54)
EOSINOPHIL # BLD AUTO: 0.2 10*3/MM3 (ref 0–0.4)
EOSINOPHIL NFR BLD AUTO: 2.6 % (ref 0.3–6.2)
ERYTHROCYTE [DISTWIDTH] IN BLOOD BY AUTOMATED COUNT: 15.7 % (ref 12.3–15.4)
GFR SERPL CREATININE-BSD FRML MDRD: 98 ML/MIN/1.73
GLUCOSE BLD-MCNC: 99 MG/DL (ref 65–99)
HCT VFR BLD AUTO: 24.7 % (ref 34–46.6)
HGB BLD-MCNC: 8.1 G/DL (ref 12–15.9)
LYMPHOCYTES # BLD AUTO: 0.8 10*3/MM3 (ref 0.7–3.1)
LYMPHOCYTES NFR BLD AUTO: 11.9 % (ref 19.6–45.3)
MCH RBC QN AUTO: 29.7 PG (ref 26.6–33)
MCHC RBC AUTO-ENTMCNC: 32.6 G/DL (ref 31.5–35.7)
MCV RBC AUTO: 91.2 FL (ref 79–97)
MONOCYTES # BLD AUTO: 0.7 10*3/MM3 (ref 0.1–0.9)
MONOCYTES NFR BLD AUTO: 10 % (ref 5–12)
NEUTROPHILS # BLD AUTO: 5.1 10*3/MM3 (ref 1.7–7)
NEUTROPHILS NFR BLD AUTO: 75.1 % (ref 42.7–76)
NRBC BLD AUTO-RTO: 0 /100 WBC (ref 0–0.2)
OSMOLALITY UR: 372 MOSM/KG (ref 300–800)
PHOSPHATE SERPL-MCNC: 2.1 MG/DL (ref 2.5–4.5)
PLATELET # BLD AUTO: 325 10*3/MM3 (ref 140–450)
PMV BLD AUTO: 6.7 FL (ref 6–12)
POTASSIUM BLD-SCNC: 4.1 MMOL/L (ref 3.5–5.2)
RBC # BLD AUTO: 2.71 10*6/MM3 (ref 3.77–5.28)
SODIUM BLD-SCNC: 131 MMOL/L (ref 136–145)
SODIUM UR-SCNC: 104 MMOL/L
TSH SERPL DL<=0.05 MIU/L-ACNC: 1.15 UIU/ML (ref 0.27–4.2)
WBC NRBC COR # BLD: 6.8 10*3/MM3 (ref 3.4–10.8)

## 2019-12-16 PROCEDURE — 84300 ASSAY OF URINE SODIUM: CPT | Performed by: INTERNAL MEDICINE

## 2019-12-16 PROCEDURE — 97535 SELF CARE MNGMENT TRAINING: CPT

## 2019-12-16 PROCEDURE — 94799 UNLISTED PULMONARY SVC/PX: CPT

## 2019-12-16 PROCEDURE — 83935 ASSAY OF URINE OSMOLALITY: CPT | Performed by: INTERNAL MEDICINE

## 2019-12-16 PROCEDURE — 80069 RENAL FUNCTION PANEL: CPT | Performed by: INTERNAL MEDICINE

## 2019-12-16 PROCEDURE — 85025 COMPLETE CBC W/AUTO DIFF WBC: CPT | Performed by: INTERNAL MEDICINE

## 2019-12-16 PROCEDURE — 99232 SBSQ HOSP IP/OBS MODERATE 35: CPT | Performed by: FAMILY MEDICINE

## 2019-12-16 PROCEDURE — 25010000002 ONDANSETRON PER 1 MG: Performed by: FAMILY MEDICINE

## 2019-12-16 PROCEDURE — 84443 ASSAY THYROID STIM HORMONE: CPT | Performed by: INTERNAL MEDICINE

## 2019-12-16 PROCEDURE — 97530 THERAPEUTIC ACTIVITIES: CPT

## 2019-12-16 PROCEDURE — 25010000002 HEPARIN (PORCINE) PER 1000 UNITS: Performed by: FAMILY MEDICINE

## 2019-12-16 PROCEDURE — 25010000002 MEROPENEM PER 100 MG: Performed by: FAMILY MEDICINE

## 2019-12-16 RX ADMIN — DAKIN'S SOLUTION 0.125% (QUARTER STRENGTH): 0.12 SOLUTION at 10:38

## 2019-12-16 RX ADMIN — MEROPENEM 500 MG: 500 INJECTION, POWDER, FOR SOLUTION INTRAVENOUS at 17:30

## 2019-12-16 RX ADMIN — HEPARIN SODIUM 5000 UNITS: 5000 INJECTION INTRAVENOUS; SUBCUTANEOUS at 22:28

## 2019-12-16 RX ADMIN — ACETYLCYSTEINE 2 ML: 200 SOLUTION ORAL; RESPIRATORY (INHALATION) at 19:00

## 2019-12-16 RX ADMIN — DEMECLOCYCLINE HYDROCHLORIDE 300 MG: 150 TABLET, FILM COATED ORAL at 22:28

## 2019-12-16 RX ADMIN — POLYETHYLENE GLYCOL 3350 17 G: 17 POWDER, FOR SOLUTION ORAL at 10:28

## 2019-12-16 RX ADMIN — Medication 1 PACKET: at 10:43

## 2019-12-16 RX ADMIN — Medication 10 ML: at 10:29

## 2019-12-16 RX ADMIN — OXYCODONE HYDROCHLORIDE 10 MG: 5 TABLET ORAL at 13:13

## 2019-12-16 RX ADMIN — Medication 1 PACKET: at 17:30

## 2019-12-16 RX ADMIN — OXYCODONE HYDROCHLORIDE 10 MG: 5 TABLET ORAL at 17:38

## 2019-12-16 RX ADMIN — SODIUM CHLORIDE TAB 1 GM 1 G: 1 TAB at 10:28

## 2019-12-16 RX ADMIN — DAKIN'S SOLUTION 0.125% (QUARTER STRENGTH): 0.12 SOLUTION at 22:39

## 2019-12-16 RX ADMIN — MEROPENEM 500 MG: 500 INJECTION, POWDER, FOR SOLUTION INTRAVENOUS at 10:23

## 2019-12-16 RX ADMIN — ALUMINUM HYDROXIDE, MAGNESIUM HYDROXIDE, AND DIMETHICONE 15 ML: 400; 400; 40 SUSPENSION ORAL at 22:29

## 2019-12-16 RX ADMIN — CARVEDILOL 12.5 MG: 6.25 TABLET, FILM COATED ORAL at 17:31

## 2019-12-16 RX ADMIN — FAMOTIDINE 20 MG: 20 TABLET, FILM COATED ORAL at 10:29

## 2019-12-16 RX ADMIN — ALUMINUM HYDROXIDE, MAGNESIUM HYDROXIDE, AND DIMETHICONE 15 ML: 400; 400; 40 SUSPENSION ORAL at 10:23

## 2019-12-16 RX ADMIN — HEPARIN SODIUM 5000 UNITS: 5000 INJECTION INTRAVENOUS; SUBCUTANEOUS at 10:27

## 2019-12-16 RX ADMIN — LEVETIRACETAM 500 MG: 500 SOLUTION ORAL at 10:29

## 2019-12-16 RX ADMIN — MICONAZOLE NITRATE: 20 POWDER TOPICAL at 22:30

## 2019-12-16 RX ADMIN — DEMECLOCYCLINE HYDROCHLORIDE 300 MG: 150 TABLET, FILM COATED ORAL at 10:28

## 2019-12-16 RX ADMIN — ALBUTEROL SULFATE 2.5 MG: 2.5 SOLUTION RESPIRATORY (INHALATION) at 08:27

## 2019-12-16 RX ADMIN — CARVEDILOL 12.5 MG: 6.25 TABLET, FILM COATED ORAL at 10:41

## 2019-12-16 RX ADMIN — Medication 10 ML: at 22:30

## 2019-12-16 RX ADMIN — AMITRIPTYLINE HYDROCHLORIDE 100 MG: 50 TABLET, FILM COATED ORAL at 22:28

## 2019-12-16 RX ADMIN — ALBUTEROL SULFATE 2.5 MG: 2.5 SOLUTION RESPIRATORY (INHALATION) at 19:00

## 2019-12-16 RX ADMIN — Medication 1 PACKET: at 22:38

## 2019-12-16 RX ADMIN — CLOPIDOGREL BISULFATE 75 MG: 75 TABLET ORAL at 10:28

## 2019-12-16 RX ADMIN — ONDANSETRON 4 MG: 2 INJECTION INTRAMUSCULAR; INTRAVENOUS at 13:26

## 2019-12-16 RX ADMIN — MEROPENEM 500 MG: 500 INJECTION, POWDER, FOR SOLUTION INTRAVENOUS at 00:14

## 2019-12-16 RX ADMIN — SODIUM CHLORIDE TAB 1 GM 1 G: 1 TAB at 22:27

## 2019-12-16 RX ADMIN — MICONAZOLE NITRATE: 20 POWDER TOPICAL at 10:38

## 2019-12-16 RX ADMIN — ESCITALOPRAM 10 MG: 10 TABLET, FILM COATED ORAL at 10:28

## 2019-12-16 RX ADMIN — LEVETIRACETAM 500 MG: 500 SOLUTION ORAL at 22:29

## 2019-12-16 RX ADMIN — ACETYLCYSTEINE 2 ML: 200 SOLUTION ORAL; RESPIRATORY (INHALATION) at 08:27

## 2019-12-16 RX ADMIN — MICONAZOLE NITRATE: 20 POWDER TOPICAL at 00:14

## 2019-12-16 NOTE — THERAPY TREATMENT NOTE
Acute Care - Occupational Therapy Treatment Note  [facility]     Patient Name: Sri Bobo  : 1960  MRN: [id number]  Today's Date: 2019             Admit Date: 2019       ICD-10-CM ICD-9-CM   1. Pneumonia of both lower lobes due to infectious organism (CMS/[facility]) J18.1 483.8   2. Attention to gastrostomy tube (CMS/[facility]) Z43.1 V55.1   3. Constipation, unspecified constipation type K59.00 564.00   4. Rhinovirus B34.8 079.3   5. Closed fracture of eighth thoracic vertebra, unspecified fracture morphology, initial encounter (CMS/[facility]) S22.069A 805.2   6. Fever, unspecified fever cause R50.9 780.60     Patient Active Problem List   Diagnosis   • Anemia, B12 deficiency   • Dysphagia, pharyngoesophageal phase   • Malignant neoplasm of tonsillar fossa (CMS/[facility])   • Osteoporosis   • Spinal stenosis, lumbar   • Erosive esophagitis   • Polyarthralgia   • Peripheral vascular disease (CMS/[facility])   • Stenosis of right carotid artery   • Anxiety   • Benign essential hypertension   • Anemia   • Pneumonia due to infectious organism   • Decubitus ulcer of ischial area, right, stage IV (CMS/[facility])   • Closed displaced fracture of acromial end of left clavicle     Past Medical History:   Diagnosis Date   • Anemia    • Broken hip (CMS/[facility]) 2019    rt hip broken   • Cancer (CMS/[facility]) 2013    Throat   • Cerebral hemorrhage (CMS/[facility])    • Dislocated shoulder 2017    dislocated left shoulder   • Fall 2018    rt shoulder and elbow injured with fall, was inpt at [facility] ( to start PT in Dec 2018)   • Gastrostomy tube in place (CMS/[facility])    • GERD (gastroesophageal reflux disease)    •  1 para 1     [id number]   • Hip pain     lt   • History of right common carotid artery stent placement    • Hypertension    • Pneumonia 2018   • Renal failure, chronic, stage 3 (moderate) (CMS/[facility])    • Seizures (CMS/[facility])    • Shingles 2017   • Vitamin B 12 deficiency      Past Surgical History:   Procedure Laterality Date   •  BUNIONECTOMY     • CAROTID STENT Right    • DEBRIDEMENT OF ISCHEAL ULCER/BUTTOCKS WOUND Right 11/26/2019    Procedure: DEBRIDEMENT OF ISCHEAL ULCER/BUTTOCKS WOUND;  Surgeon: Melanie Zuñiga MD;  Location: [facility] MAIN OR;  Service: General   • ESOPHAGEAL DILATATION      botox injection   • GTUBE INSERTION     • HIP SURGERY Right 03/2019    rt hip broken   • ORIF ULNA/RADIUS FRACTURES Right     ORIF, right distal radius fx 08/10/2018 metal plate in right shoulder   • WRIST FRACTURE SURGERY Left 2012       Therapy Treatment    Rehabilitation Treatment Summary     Row Name 12/16/19 0929             Treatment Time/Intention    Discipline  occupational therapist  -SR      Document Type  therapy note (daily note)  -SR      Recorded by [SR] Veronica Wright, OT 12/16/19 1140      Row Name 12/16/19 0929             Functional Mobility    Functional Mobility- Ind. Level  contact guard assist  -SR      Functional Mobility- Device  rolling walker  -SR      Recorded by [SR] Veronica Wright, OT 12/16/19 1140      Row Name 12/16/19 0929             Transfer Assessment/Treatment    Transfer Assessment/Treatment  sit-stand transfer  -SR      Recorded by [SR] Veronica Wright, OT 12/16/19 1140      Row Name 12/16/19 0929             Sit-Stand Transfer    Sit-Stand [place] (Transfers)  minimum assist (75% patient effort)  -SR      Assistive Device (Sit-Stand Transfers)  walker, front-wheeled  -SR      Recorded by [SR] Veronica Wright, OT 12/16/19 1140      Row Name 12/16/19 0929             ADL Assessment/Intervention    BADL Assessment/Intervention  grooming;upper body dressing  -SR      Recorded by [SR] Veronica Wright OT 12/16/19 1140      Row Name 12/16/19 0929             Upper Body Dressing Assessment/Training    Upper Body Dressing [place] Level  moderate assist (50% patient effort)  -SR      Upper Body Dressing Position  supported sitting  -SR      Recorded by [SR] Veronica Wright  HAYLIE, OT 12/16/19 1140      Row Name 12/16/19 0929             BADL Safety/Performance    Impairments, BADL Safety/Performance  balance;endurance/activity tolerance;strength  -SR      Recorded by [SR] Veronica Wright, OT 12/16/19 1140      Row Name 12/16/19 0929             Static Sitting Balance    Level of [place] (Unsupported Sitting, Static Balance)  supervision  -SR      Recorded by [SR] Veronica Wright, OT 12/16/19 1140      Row Name 12/16/19 0929             Static Standing Balance    Level of [place] (Supported Standing, Static Balance)  minimal assist, 75% patient effort  -SR      Recorded by [SR] Veronica Wright, OT 12/16/19 1140      Row Name 12/16/19 0929             Dynamic Standing Balance    Level of [place], Reaches Outside Midline (Standing, Dynamic Balance)  minimal assist, 75% patient effort  -SR      Recorded by [SR] Veronica Wright, OT 12/16/19 1140      Row Name 12/16/19 0929             Positioning and Restraints    Pre-Treatment Position  in bed  -SR      Post Treatment Position  chair  -SR      In Bed  notified nsg;call light within reach;encouraged to call for assist;exit alarm on  -SR      Recorded by [SR] Veronica Wright, OT 12/16/19 1140      Row Name 12/16/19 0929             Pain Assessment    Additional Documentation  Pain Scale: FACES Pre/Post-Treatment (Group)  -SR      Recorded by [SR] Veronica Wright, OT 12/16/19 1140      Row Name 12/16/19 0929             Pain Scale: FACES Pre/Post-Treatment    Pain: FACES Scale, Pretreatment  2-->hurts little bit  -SR      Pain: FACES Scale, Post-Treatment  4-->hurts little more  -SR      Recorded by [SR] Veronica Wright, OT 12/16/19 1140      Row Name                Wound 11/27/19 0800 Left lumbar spine Pressure Injury    Wound - Properties Group Date first assessed: 11/27/19 [CP] Time first assessed: 0800 [CP] Present on Hospital Admission: N [CP] Side: Left [CP] Location: lumbar  spine [CP] Primary Wound Type: Pressure inj [CP] Stage, Pressure Injury: deep tissue injury [CP] Recorded by:  [CP] Amber Alvarado RN 11/27/19 1211    Row Name                Wound 11/25/19 2045 Left forehead Other (comment)    Wound - Properties Group Date first assessed: 11/25/19 [BB] Time first assessed: 2045 [BB] Present on Hospital Admission: Y [BB] Side: Left [BB] Location: forehead [BB] Primary Wound Type: Other [BB], bruise/hematoma  Recorded by:  [BB] Estefani Lewis RN 11/26/19 0106    Row Name                Wound 11/26/19 1722 Right hip Incision    Wound - Properties Group Date first assessed: 11/26/19 [NATALIE] Time first assessed: 1722 [NATALIE] Side: Right [NATALIE] Location: hip [NATALIE] Primary Wound Type: Incision [NATALIE] Recorded by:  [NATALIE] Kendra Jackson RN 11/26/19 1722    Row Name                Wound 12/12/19 1820 Right gluteal Pressure Injury    Wound - Properties Group Date first assessed: 12/12/19 [AD] Time first assessed: 1820 [AD] Present on Hospital Admission: Y [AD] Side: Right [AD] Location: gluteal [AD] Primary Wound Type: Pressure inj [AD] Stage, Pressure Injury: unstageable [AD] Recorded by:  [AD] Ashley Cyr RN 12/13/19 0953    Row Name 12/16/19 0929             Plan of Care Review    Outcome Summary  Pt continues to demonstrate decreased strength and low activity tolerence.  She requires encouragement for activity and reports feelings of depression.  RN looking to see if she has meds for depression.  She will require IP rehab at discharge.   -SR      Recorded by [SR] Veronica Wright, OT 12/16/19 1140        User Key  (r) = Recorded By, (t) = Taken By, (c) = Cosigned By    Initials Name Effective Dates Discipline    SR Veronica Wright, OT 03/01/19 -  OT    Kendra Laughlin RN 03/01/19 -  Nurse    Amebr Leo RN 03/04/19 -  Nurse    Estefani Murillo RN 03/01/19 -  Nurse    Ashley Law RN 08/22/19 -  Nurse        Wound 11/25/19 2045 Left forehead Other  (comment) (Active)   Dressing Appearance dry;intact 12/16/2019  4:20 AM   Base dressing in place, unable to visualize 12/16/2019  4:20 AM       Wound 11/26/19 1722 Right hip Incision (Active)   Dressing Appearance dry;intact;no drainage 12/16/2019  4:20 AM   Base dressing in place, unable to visualize 12/16/2019  4:20 AM   Dressing Care, Wound dressing applied 12/15/2019  8:20 PM       Wound 11/27/19 0800 Left lumbar spine Pressure Injury (Active)   Dressing Appearance dry;intact 12/16/2019  4:20 AM   Closure LAZARA 12/16/2019  4:20 AM   Base dressing in place, unable to visualize 12/16/2019  4:20 AM       Wound 12/12/19 1820 Right gluteal Pressure Injury (Active)   Dressing Appearance dry;intact 12/16/2019  4:20 AM   Closure Adhesive bandage 12/16/2019  4:20 AM   Base dressing in place, unable to visualize 12/16/2019  4:20 AM   Periwound pink 12/15/2019  8:20 PM   Periwound Temperature warm 12/15/2019  8:20 PM   Edges irregular;open 12/15/2019  8:20 PM   Drainage Characteristics/Odor yellow 12/15/2019  8:20 PM   Drainage Amount small 12/15/2019  8:20 PM   Care, Wound cleansed with;sterile half normal saline 12/15/2019  8:20 PM   Dressing Care, Wound dressing applied;dressing changed;gauze, [id number] 12/15/2019  8:20 PM           OT Recommendation and Plan     Outcome Summary: Pt continues to demonstrate decreased strength and low activity tolerence.  She requires encouragement for activity and reports feelings of depression.  RN looking to see if she has meds for depression.  She will require IP rehab at discharge.        Time Calculation:   Time Calculation- OT     Row Name 12/16/19 1141             Time Calculation- OT    OT Start Time  0929  -SR      OT Stop Time  0951  -SR      OT Time Calculation (min)  22 min  -SR      Total Timed Code Minutes- OT  22 minute(s)  -SR      OT Received On  12/16/19  -SR      OT - Next Appointment  12/18/19  -SR        User Key  (r) = Recorded By, (t) = Taken By, (c) = Cosigned  By    Initials Name Provider Type    SR Veronica Wright, OT Occupational Therapist        Therapy Charges for Today     Code Description Service Date Service Provider Modifiers Qty    51041635471 HC OT SELF CARE/MGMT/TRAIN EA 15 MIN 12/16/2019 Veronica Wright, OT GO 1    [id number]  OT THERAPEUTIC ACT EA 15 MIN 12/16/2019 Veronica Wright, OT GO 1               Veronica Wright OT  12/16/2019

## 2019-12-16 NOTE — PLAN OF CARE
To discharge outpatient rehabilitation to [facility] per family. Pt. Ambulating to bedside commode with one assist. One large BM on this shift at this time.

## 2019-12-16 NOTE — PROGRESS NOTES
"NEPHROLOGY PROGRESS NOTE------[facility]    [facility]  [phone]  John Canales MD      Patient Care Team:  Leonid Villeda Jr., MD as PCP - General  Leonid Villeda Jr., MD as PCP - Family Medicine  John Canales MD as Consulting Physician (Nephrology)      Provider:  John Canales MD  Patient Name: Sri Bobo  :  1960    SUBJECTIVE:  F/u HTN  No cp/soa  Feels better    Medication:    acetylcysteine 2 mL Nebulization BID - RT   albuterol 2.5 mg Nebulization BID - RT   aluminum-magnesium hydroxide-simethicone 15 mL Oral BID   amitriptyline 100 mg Oral Nightly   BENEPROTEIN 1 packet Per G Tube TID   carvedilol 12.5 mg Oral BID With Meals   cloNIDine 1 patch Transdermal Weekly   clopidogrel 75 mg Oral Daily   cyanocobalamin 1,000 mcg Intramuscular Q28 Days   demeclocycline 300 mg Oral Q12H   escitalopram 10 mg Oral Daily   famotidine 20 mg Oral Daily   heparin (porcine) 5,000 Units Subcutaneous Q12H   levETIRAcetam 500 mg Oral Q12H   meropenem 500 mg Intravenous Q8H   miconazole  Topical Q12H   pharmacy 1 each Does not apply Once   polyethylene glycol 17 g Oral Daily   sodium chloride 10 mL Intravenous Q12H   sodium chloride 1 g Oral BID   sodium hypochlorite  Topical Q12H          OBJECTIVE    Vital Sign Min/Max for last 24 hours  Temp  Min: 97.1 °F (36.2 °C)  Max: 98.4 °F (36.9 °C)   BP  Min: 91/51  Max: 186/97   Pulse  Min: 77  Max: 92   Resp  Min: 12  Max: 22   SpO2  Min: 87 %  Max: 100 %   No data recorded   Weight  Min: 42 kg (92 lb 9.5 oz)  Max: 42 kg (92 lb 9.5 oz)     Flowsheet Rows      First Filed Value   Admission Height  157.5 cm (62\") Documented at 2019 09   Admission Weight  39.5 kg (87 lb 1.3 oz) Documented at 2019 0905          No intake/output data recorded.  I/O last 3 completed shifts:  In: 450 [Other:250; NG/GT:200]  Out: 1800 [Urine:1800]    Physical Exam:  General Appearance: alert, appears stated age and " cooperative  Head: normocephalic, without obvious abnormality and atraumatic  Eyes: conjunctivae and sclerae normal and no icterus  Neck: supple and no JVD  Lungs: clear to auscultation and respirations regular  Heart: regular rhythm & normal rate and normal S1, S2  Chest: Wall no abnormalities observed  Abdomen: normal bowel sounds and soft non-tender  Extremities: moves extremities well, no edema, no cyanosis and no redness  Skin: no bleeding, bruising or rash, turgor normal, color normal and no lesions noted  Neurologic: Alert, and oriented. No focal deficits    Labs:    WBC WBC   Date Value Ref Range Status   12/16/2019 6.80 3.40 - 10.80 10*3/mm3 Final   12/15/2019 6.20 3.40 - 10.80 10*3/mm3 Final   12/14/2019 5.60 3.40 - 10.80 10*3/mm3 Final      HGB Hemoglobin   Date Value Ref Range Status   12/16/2019 8.1 (L) 12.0 - 15.9 g/dL Final   12/15/2019 7.9 (L) 12.0 - 15.9 g/dL Final   12/14/2019 7.9 (L) 12.0 - 15.9 g/dL Final      HCT Hematocrit   Date Value Ref Range Status   12/16/2019 24.7 (L) 34.0 - 46.6 % Final   12/15/2019 23.5 (L) 34.0 - 46.6 % Final   12/14/2019 23.7 (L) 34.0 - 46.6 % Final      Platlets No results found for: LABPLAT   MCV MCV   Date Value Ref Range Status   12/16/2019 91.2 79.0 - 97.0 fL Final   12/15/2019 92.2 79.0 - 97.0 fL Final   12/14/2019 92.3 79.0 - 97.0 fL Final          Sodium Sodium   Date Value Ref Range Status   12/16/2019 131 (L) 136 - 145 mmol/L Final   12/15/2019 136 136 - 145 mmol/L Final   12/14/2019 137 136 - 145 mmol/L Final      Potassium Potassium   Date Value Ref Range Status   12/16/2019 4.1 3.5 - 5.2 mmol/L Final   12/15/2019 4.0 3.5 - 5.2 mmol/L Final   12/14/2019 4.1 3.5 - 5.2 mmol/L Final      Chloride Chloride   Date Value Ref Range Status   12/16/2019 97 (L) 98 - 107 mmol/L Final   12/15/2019 103 98 - 107 mmol/L Final   12/14/2019 103 98 - 107 mmol/L Final      CO2 CO2   Date Value Ref Range Status   12/16/2019 27.0 22.0 - 29.0 mmol/L Final   12/15/2019 28.0 22.0  - 29.0 mmol/L Final   12/14/2019 26.0 22.0 - 29.0 mmol/L Final      BUN BUN   Date Value Ref Range Status   12/16/2019 21 8 - 23 mg/dL Final   12/15/2019 16 8 - 23 mg/dL Final   12/14/2019 30 (H) 8 - 23 mg/dL Final      Creatinine Creatinine   Date Value Ref Range Status   12/16/2019 0.62 0.57 - 1.00 mg/dL Final   12/15/2019 0.48 (L) 0.57 - 1.00 mg/dL Final   12/14/2019 0.57 0.57 - 1.00 mg/dL Final      Calcium Calcium   Date Value Ref Range Status   12/16/2019 8.5 (L) 8.6 - 10.5 mg/dL Final   12/15/2019 8.6 8.6 - 10.5 mg/dL Final   12/14/2019 7.9 (L) 8.6 - 10.5 mg/dL Final      PO4 No components found for: PO4   Albumin Albumin   Date Value Ref Range Status   12/16/2019 2.80 (L) 3.50 - 5.20 g/dL Final   12/15/2019 2.90 (L) 3.50 - 5.20 g/dL Final   12/14/2019 2.90 (L) 3.50 - 5.20 g/dL Final      Magnesium Magnesium   Date Value Ref Range Status   12/14/2019 1.7 1.6 - 2.4 mg/dL Final      Uric Acid No components found for: URIC ACID     Imaging Results (Last 72 Hours)     ** No results found for the last 72 hours. **          Results for orders placed during the hospital encounter of 12/12/19   XR Chest 2 View    Narrative DATE OF EXAM:  12/12/2019 10:48 AM     PROCEDURE:  XR CHEST 2 VW-     INDICATIONS:  cough       COMPARISON:  CT chest 11/26/2019. AP portable chest 11/25/2019.     TECHNIQUE:   Two radiologic views of the chest.     FINDINGS:  Right lower lobe airspace disease persists, and appears fairly similar  to the 11/25/2019 examination. There is new ill-defined airspace disease  in the left lower lobe.     Stable cardiac enlargement. Left chest wall luis catheter remains at  the cavoatrial junction. No visible pneumothorax. Surgical changes of  the right humerus. Degenerative changes of the left humerus. Chronic  appearing deformity of the distal left clavicle. Diffuse osteopenia.     There is a chronic appearing severe compression fracture of the T5  vertebral body, chronic appearing compression fracture  of the superior  endplate of T4, corresponding to the CT chest from 11/26/2019. There is,  however, an age-indeterminate compression fracture of T8, new since  11/26/2019 CT chest.       Impression    1. Persistent right lower lobe airspace disease worrisome for pneumonia.  New left lower lobe airspace disease worrisome for developing pneumonia.  2. Age indeterminate compression fracture of T8, new since 11/26/2019  comparison. Chronic T4 and T5 compression fractures.     Electronically Signed By-Dr. Ashley Jerez MD On:12/12/2019 11:18 AM  This report was finalized on [id number] by Dr. Ashley Jerez MD.   XR Spine Lumbar Complete 4+VW    Narrative XR SPINE LUMBAR COMPLETE 4+VW-     Date of Exam: 12/12/2019 10:22 AM     Indication: Low back pain after fall.     Comparison Exams: September 6, 2017     Technique: 5 radiographs of the lumbar spine     FINDINGS:  No acute fracture is identified. There is generalized osteopenia. The  alignment appears stable, with grade 1/2 anterolisthesis of L4 on L5.  The vertebral body heights appear normal. There is moderate degenerative  loss of disc height at L4-5 and L5-S1. There is mild to moderate facet  arthropathy at L3-4 through L5-S1. The soft tissues are unremarkable.       Impression 1.No acute fracture or traumatic subluxation of lumbar spine identified.  2.Degenerative changes as described above.     Electronically Signed By-DR. Jevon Dominguez MD On:12/12/2019 11:16 AM  This report was finalized on [id number] by DR. Jevon Dominguez MD.              ASSESSMENT / PLAN      Pneumonia due to infectious organism    Dysphagia, pharyngoesophageal phase    Decubitus ulcer of ischial area, right, stage IV (CMS/HCC)    Closed displaced fracture of acromial end of left clavicle    · Hypertension--better. Clonidine patch was applied.   · Hyponatremia, mild--probably due to SIADH. Its better. On demeclocycline  · Hx tonsillar cancer  · Nutrition--s/p g-tube  replacement  · PNA--on ATBx     Sodium dropped, will check urine osm, Jayant and TSH  May need to restrict fluids  Bp reasonable controlled  ATBx per eufemia Canales MD  [facility]  [phone]  12/16/19  7:06 AM

## 2019-12-16 NOTE — PROGRESS NOTES
Continued Stay Note  ALFREDO Lindquist     Patient Name: Sri Bobo  MRN: [id number]  Today's Date: 12/16/2019    Admit Date: 12/12/2019    Discharge Plan     Row Name 12/16/19 1452       Plan    Plan  Final d/c plan will be home with spouse and outpt physical therapy at [facility] .        Discharge Codes    No documentation.             Sugar Moreno RN

## 2019-12-16 NOTE — PLAN OF CARE
Problem: Patient Care Overview  Goal: Plan of Care Review  Outcome: Ongoing (interventions implemented as appropriate)  Flowsheets (Taken 12/16/2019 2822)  Outcome Summary: Pt continues to demonstrate decreased strength and low activity tolerence.  She requires encouragement for activity and reports feelings of depression.  RN looking to see if she has meds for depression.  She will require IP rehab at discharge.

## 2019-12-16 NOTE — PROGRESS NOTES
LOS: 3 days   Patient Care Team:  Leonid Villeda Jr., MD as PCP - General  Leonid Villeda Jr., MD as PCP - Family Medicine  John Canales MD as Consulting Physician (Nephrology)    Chief Complaint: Cough and shortness of breath    Subjective     Interval History:     The patient states her cough and shortness of breath is improved.      Review of Systems   Constitutional: Positive for fatigue. Negative for chills and fever.   Respiratory: Positive for cough. Negative for shortness of breath.    Cardiovascular: Negative for chest pain and leg swelling.   Gastrointestinal: Negative for abdominal pain, diarrhea and nausea.   Skin: Negative for pallor and rash.         Objective     Vital Signs  Temp:  [97.1 °F (36.2 °C)-98.4 °F (36.9 °C)] 97.9 °F (36.6 °C)  Heart Rate:  [77-92] 85  Resp:  [12-22] 16  BP: ()/(51-97) 186/97    Physical Exam   Cardiovascular: Normal rate, regular rhythm, normal heart sounds and intact distal pulses.   Pulmonary/Chest: Effort normal and breath sounds normal.   Abdominal: Soft. Bowel sounds are normal.   Musculoskeletal: Normal range of motion.   Skin: Skin is warm and dry.   Nursing note and vitals reviewed.        Results Review:    Lab Results (last 24 hours)     Procedure Component Value Units Date/Time    Renal Function Panel [[id number]]  (Abnormal) Collected:  12/16/19 0518    Specimen:  Blood Updated:  12/16/19 0550     Glucose 99 mg/dL      BUN 21 mg/dL      Creatinine 0.62 mg/dL      Sodium 131 mmol/L      Potassium 4.1 mmol/L      Chloride 97 mmol/L      CO2 27.0 mmol/L      Calcium 8.5 mg/dL      Albumin 2.80 g/dL      Phosphorus 2.1 mg/dL      Anion Gap 7.0 mmol/L      BUN/Creatinine Ratio 33.9     eGFR Non African Amer 98 mL/min/1.73     Narrative:       GFR Normal >60  Chronic Kidney Disease <60  Kidney Failure <15      CBC & Differential [[id number]] Collected:  12/16/19 0518    Specimen:  Blood Updated:  12/16/19 0525    Narrative:       The following  orders were created for panel order CBC & Differential.  Procedure                               Abnormality         Status                     ---------                               -----------         ------                     CBC Auto Differential[[id number]]        Abnormal            Final result                 Please view results for these tests on the individual orders.    CBC Auto Differential [[id number]]  (Abnormal) Collected:  12/16/19 0518    Specimen:  Blood Updated:  12/16/19 0525     WBC 6.80 10*3/mm3      RBC 2.71 10*6/mm3      Hemoglobin 8.1 g/dL      Hematocrit 24.7 %      MCV 91.2 fL      MCH 29.7 pg      MCHC 32.6 g/dL      RDW 15.7 %      RDW-SD 49.9 fl      MPV 6.7 fL      Platelets 325 10*3/mm3      Neutrophil % 75.1 %      Lymphocyte % 11.9 %      Monocyte % 10.0 %      Eosinophil % 2.6 %      Basophil % 0.4 %      Neutrophils, Absolute 5.10 10*3/mm3      Lymphocytes, Absolute 0.80 10*3/mm3      Monocytes, Absolute 0.70 10*3/mm3      Eosinophils, Absolute 0.20 10*3/mm3      Basophils, Absolute 0.00 10*3/mm3      nRBC 0.0 /100 WBC     Blood Culture - Blood, Arm, Right [[id number]] Collected:  12/12/19 1212    Specimen:  Blood from Arm, Right Updated:  12/15/19 1230     Blood Culture No growth at 3 days    Blood Culture - Blood, Blood, Venous Line [[id number]] Collected:  12/12/19 1157    Specimen:  Blood, Venous Line Updated:  12/15/19 1215     Blood Culture No growth at 3 days         Imaging Results (Last 24 Hours)     ** No results found for the last 24 hours. **         ECG/EMG Results (last 24 hours)     ** No results found for the last 24 hours. **           I reviewed the patient's new clinical results.    Medication Review: I have reviewed the medications    Current Facility-Administered Medications:   •  acetylcysteine (MUCOMYST) 20 % solution 2 mL, 2 mL, Nebulization, BID - RT, DrawZac MD, 4 mL at 12/15/19 1832  •  albuterol (PROVENTIL) nebulizer solution 0.083% 2.5 mg/3mL, 2.5  mg, Nebulization, BID - RT, Draw, MD Zac, 2.5 mg at 12/15/19 1832  •  albuterol sulfate HFA (PROVENTIL HFA;VENTOLIN HFA;PROAIR HFA) inhaler 2 puff, 2 puff, Inhalation, Q4H PRN, Leonid Villeda Jr., MD  •  aluminum-magnesium hydroxide-simethicone (MAALOX MAX) 400-400-40 MG/5ML suspension 15 mL, 15 mL, Oral, BID, Leonid Villeda Jr., MD, 15 mL at 12/15/19 2134  •  amitriptyline (ELAVIL) tablet 100 mg, 100 mg, Oral, Nightly, Leonid Villeda Jr., MD, 100 mg at 12/15/19 2133  •  BENEPROTEIN packet 1 packet, 1 packet, Per G Tube, TID, Chata Bhat, RD, 1 packet at 12/15/19 2149  •  calcium carbonate (TUMS) chewable tablet 500 mg (200 mg elemental), 2 tablet, Oral, BID PRN, Leonid Villeda Jr., MD  •  carvedilol (COREG) tablet 12.5 mg, 12.5 mg, Oral, BID With Meals, Leonid Villeda Jr., MD, 12.5 mg at 12/15/19 1820  •  cloNIDine (CATAPRES-TTS) 0.3 MG/24HR patch 1 patch, 1 patch, Transdermal, Weekly, John Canales MD, 1 patch at 12/12/19 1923  •  clopidogrel (PLAVIX) tablet 75 mg, 75 mg, Oral, Daily, Leonid Villeda Jr., MD, 75 mg at 12/15/19 0846  •  cyanocobalamin injection 1,000 mcg, 1,000 mcg, Intramuscular, Q28 Days, Leonid Villeda Jr., MD, 1,000 mcg at 12/13/19 0803  •  demeclocycline (DECLOMYCIN) tablet 300 mg, 300 mg, Oral, Q12H, Leonid Villeda Jr., MD, 300 mg at 12/15/19 2133  •  escitalopram (LEXAPRO) tablet 10 mg, 10 mg, Oral, Daily, Ugo Cedillo MD, 10 mg at 12/15/19 1350  •  famotidine (PEPCID) tablet 20 mg, 20 mg, Oral, Daily, Leonid Villeda Jr., MD, 20 mg at 12/15/19 0847  •  heparin (porcine) 5000 UNIT/ML injection 5,000 Units, 5,000 Units, Subcutaneous, Q12H, Leonid Villeda Jr., MD, 5,000 Units at 12/15/19 2133  •  ipratropium-albuterol (DUO-NEB) nebulizer solution 3 mL, 3 mL, Nebulization, Q4H PRN, Leonid Villeda Jr., MD, 3 mL at 12/14/19 0719  •  labetalol (NORMODYNE,TRANDATE) injection 10 mg, 10 mg, Intravenous, Q6H PRN, Leonid Villeda Jr., MD  •  lactulose  (CHRONULAC) 10 GM/15ML solution 10 g, 10 g, Per G Tube, BID PRN, Leonid Villeda Jr., MD  •  levETIRAcetam (KEPPRA) 100 MG/ML solution 500 mg, 500 mg, Oral, Q12H, Leonid Villeda Jr., MD, 500 mg at 12/15/19 2134  •  magnesium hydroxide (MILK OF MAGNESIA) suspension 2400 mg/10mL 10 mL, 10 mL, Oral, Daily PRN, Leonid Villeda Jr., MD  •  meropenem (MERREM) 500 mg in sodium chloride 0.9 % 100 mL IVPB, 500 mg, Intravenous, Q8H, Leonid Villeda Jr., MD, Last Rate: 0 mL/hr at 12/12/19 1830, 500 mg at 12/16/19 0014  •  miconazole (MICOTIN) 2 % powder, , Topical, Q12H, Noris Wilcox, JESSICA  •  ondansetron (ZOFRAN) injection 4 mg, 4 mg, Intravenous, Q6H PRN, Leonid Villeda Jr., MD  •  oxyCODONE (ROXICODONE) immediate release tablet 10 mg, 10 mg, Oral, 4x Daily PRN, Leonid Villeda Jr., MD, 10 mg at 12/15/19 2134  •  Pharmacy Message, 1 each, Does not apply, Once, Leonid Villeda Jr., MD  •  polyethylene glycol 3350 powder (packet), 17 g, Oral, Daily, Leonid Villeda Jr., MD, 17 g at 12/15/19 0845  •  [COMPLETED] Insert peripheral IV, , , Once **AND** sodium chloride 0.9 % flush 10 mL, 10 mL, Intravenous, PRN, Leonid Villeda Jr., MD  •  sodium chloride 0.9 % flush 10 mL, 10 mL, Intravenous, Q12H, Leonid Villeda Jr., MD, 10 mL at 12/15/19 2149  •  sodium chloride 0.9 % flush 10 mL, 10 mL, Intravenous, PRN, Leonid Villeda Jr., MD  •  sodium chloride tablet 1 g, 1 g, Oral, BID, Leonid Villeda Jr., MD, 1 g at 12/15/19 2131  •  sodium hypochlorite (DAKIN'S) 0.125 % topical solution 0.125% (quarter strength), , Topical, Q12H, Leonid Villeda Jr., MD       Principal Problem:    Pneumonia due to infectious organism/rhinovirus infection-stable-continue antibiotic therapy bronchodilators and oxygen.    Active Problems:    Dysphagia, pharyngoesophageal phase-unchanged      Decubitus ulcer of ischial area, right, stage IV (CMS/HCC)-stable-continue wound therapy and antibiotics      Closed displaced  fracture of acromial end of left clavicle-unchanged       Hypertension-stable-blood pressure better controlled on medication.     Continue current treatment.  Discharge planning in process.    Transfer to skilled nursing facility whenever arrangements made.    Assessment & Plan     Plan for disposition:Where: [facility]    Leonid Villeda Jr., MD  12/16/19  7:49 AM

## 2019-12-16 NOTE — PLAN OF CARE
Pt had 4 bowel movements during my shift.  No complaints of abdominal pain or discomfort.  Pt is very weak but able to ambulate to bedside commode with assistance.  Pt experiences pain with activity.  Plan is to discharge to [facility] pending acceptance.

## 2019-12-16 NOTE — PROGRESS NOTES
Continued Stay Note  ALFREDO Lindquist     Patient Name: Sri Bobo  MRN: [id number]  Today's Date: 12/16/2019    Admit Date: 12/12/2019    Discharge Plan     Row Name 12/16/19 5581       Plan    Plan Comments  SW made referral to [facility], due to [facility] not having subacute bed. [facility] unable to accept as family wants to utilize their own nutrition for pt through feeding tube per liaison. Pt/family agreeable to outpatient services at [facility] & plan to d/c home.     Row Name 12/16/19 7834       Plan    Plan  Final d/c plan will be home with spouse and outpt physical therapy at [facility] .     COY Pisano    Phone: [phone]  Cell: [phone]  Fax: [phone]  [email or web address]

## 2019-12-16 NOTE — PROGRESS NOTES
Daily Progress Note        Pneumonia due to infectious organism    Dysphagia, pharyngoesophageal phase    Decubitus ulcer of ischial area, right, stage IV (CMS/HCC)    Closed displaced fracture of acromial end of left clavicle      Assessment     rhino virus positive     CT chest  showed  Persistent consolidation right lower lobe and mild Bronchiectasis,  Overall  Compatible with chronic aspiration and not  Changed from prior CT scan    Patient currently on room air denies any shortness of breath from baseline     Sputum cultures mixed respiratory suzy    Wound culture showed light growth of E. coli ESBL and enterococcus species      past medical history significant for:  bronchoscopy 03/27/2019 showed E coli with ESBL treated with meropenem  bronchoscopy 01/25/2019 AFB cultures negative  CT scan 01/17/2019 showed right lower lobe obstruction with mucus plug or tumor   bronchoscopy July 2018 MRSA pneumonia  Right upper lobe nodules,  CT scan of the chest  08/24/2018 compared to 07/26/2018 just showed decreased size  favoring infectious process  Status post a fall and right wrist fracture  Patient was treated recently for MRSA  Right lower lobe pneumonia with IV vancomycin  CT scan of the chest 07/26/2018 showed 2 right upper lobe lung nodules suspicious for malignancy     Bronchoscopy 07/27/2018 for coughing up blood however there was No evidence of active hemoptysis  Moderate to severe mucopurulent secretions were noted in the right lower lobe and right upper lobe  FNA from RLL negative cytology, BAL Rt lung negative cytology  History of left tonsillar cancer in 2013, status post PEG tube  Status post radiation     Status post EGD 10/03/2017 no obvious source of bleeding  BRONCHOSCOPY 10/05/2017, no hemoptysis, No endobronchial lesions  Chronic inflammatory changes cultures were positive for group B streptococcal pneumonia     seizure disorder  Osteoporosis  Peripheral vascular disease  Protein calorie  malnutrition        Plan  Antibiotics  Currently on  meropenem     Mucomyst nebulized mixed with albuterol                      LOS: 2 days     Subjective         Objective     Vital signs for last 24 hours:  Vitals:    12/15/19 1559 12/15/19 1753 12/15/19 1832 12/15/19 1837   BP: 154/85 156/66     BP Location:       Patient Position:       Pulse: 78 86 92 90   Resp: 12 18 18    Temp: 98.4 °F (36.9 °C) 97.1 °F (36.2 °C)     TempSrc: Oral Axillary     SpO2: 95% 95% 94%    Weight:       Height:           Intake/Output last 3 shifts:  I/O last 3 completed shifts:  In: 450 [Other:250; NG/GT:200]  Out: 2250 [Urine:2250]  Intake/Output this shift:  No intake/output data recorded.      Radiology  Imaging Results (Last 24 Hours)     ** No results found for the last 24 hours. **          Labs:  Results from last 7 days   Lab Units 12/15/19  0519   WBC 10*3/mm3 6.20   HEMOGLOBIN g/dL 7.9*   HEMATOCRIT % 23.5*   PLATELETS 10*3/mm3 323     Results from last 7 days   Lab Units 12/15/19  0519  12/13/19  0334   SODIUM mmol/L 136   < > 136   POTASSIUM mmol/L 4.0   < > 3.7   CHLORIDE mmol/L 103   < > 96*   CO2 mmol/L 28.0   < > 26.0   BUN mg/dL 16   < > 23   CREATININE mg/dL 0.48*   < > 0.69   CALCIUM mg/dL 8.6   < > 8.6   BILIRUBIN mg/dL  --   --  0.4   ALK PHOS U/L  --   --  85   ALT (SGPT) U/L  --   --  8   AST (SGOT) U/L  --   --  14   GLUCOSE mg/dL 97   < > 89    < > = values in this interval not displayed.         Results from last 7 days   Lab Units 12/15/19  0519 12/14/19  0611 12/13/19  0334   ALBUMIN g/dL 2.90* 2.90* 3.20*             Results from last 7 days   Lab Units 12/14/19  0611   MAGNESIUM mg/dL 1.7                   Meds:   SCHEDULE    acetylcysteine 2 mL Nebulization BID - RT   albuterol 2.5 mg Nebulization BID - RT   aluminum-magnesium hydroxide-simethicone 15 mL Oral BID   amitriptyline 100 mg Oral Nightly   BENEPROTEIN 1 packet Per G Tube TID   carvedilol 12.5 mg Oral BID With Meals   cloNIDine 1 patch  Transdermal Weekly   clopidogrel 75 mg Oral Daily   cyanocobalamin 1,000 mcg Intramuscular Q28 Days   demeclocycline 300 mg Oral Q12H   escitalopram 10 mg Oral Daily   famotidine 20 mg Oral Daily   heparin (porcine) 5,000 Units Subcutaneous Q12H   levETIRAcetam 500 mg Oral Q12H   meropenem 500 mg Intravenous Q8H   miconazole  Topical Q12H   pharmacy 1 each Does not apply Once   polyethylene glycol 17 g Oral Daily   sodium chloride 10 mL Intravenous Q12H   sodium chloride 1 g Oral BID   sodium hypochlorite  Topical Q12H     Infusions     PRNs  albuterol sulfate HFA  •  calcium carbonate  •  ipratropium-albuterol  •  labetalol  •  lactulose  •  magnesium hydroxide  •  ondansetron  •  oxyCODONE  •  [COMPLETED] Insert peripheral IV **AND** sodium chloride  •  sodium chloride    Physical Exam:  Physical Exam   Cardiovascular:   Murmur heard.  Pulmonary/Chest: No respiratory distress. She has wheezes. She has rales. She exhibits no tenderness.   Abdominal: She exhibits no distension. There is no tenderness.   Musculoskeletal: She exhibits no edema.       ROS  Review of Systems   HENT: Positive for congestion, rhinorrhea and sneezing.    Respiratory: Positive for cough. Negative for apnea, choking, chest tightness, shortness of breath and wheezing.    Cardiovascular: Negative for leg swelling.             Total time spent with patient greater than: 1 Hour

## 2019-12-17 LAB
ALBUMIN SERPL-MCNC: 3 G/DL (ref 3.5–5.2)
ANION GAP SERPL CALCULATED.3IONS-SCNC: 9 MMOL/L (ref 5–15)
BACTERIA SPEC AEROBE CULT: NORMAL
BACTERIA SPEC AEROBE CULT: NORMAL
BUN BLD-MCNC: 28 MG/DL (ref 8–23)
BUN/CREAT SERPL: 50 (ref 7–25)
CALCIUM SPEC-SCNC: 8.8 MG/DL (ref 8.6–10.5)
CHLORIDE SERPL-SCNC: 92 MMOL/L (ref 98–107)
CO2 SERPL-SCNC: 30 MMOL/L (ref 22–29)
CREAT BLD-MCNC: 0.56 MG/DL (ref 0.57–1)
DEPRECATED RDW RBC AUTO: 49.9 FL (ref 37–54)
EOSINOPHIL # BLD MANUAL: 0.21 10*3/MM3 (ref 0–0.4)
EOSINOPHIL NFR BLD MANUAL: 3 % (ref 0.3–6.2)
ERYTHROCYTE [DISTWIDTH] IN BLOOD BY AUTOMATED COUNT: 15.6 % (ref 12.3–15.4)
GFR SERPL CREATININE-BSD FRML MDRD: 110 ML/MIN/1.73
GLUCOSE BLD-MCNC: 99 MG/DL (ref 65–99)
HCT VFR BLD AUTO: 26.1 % (ref 34–46.6)
HGB BLD-MCNC: 8.7 G/DL (ref 12–15.9)
LYMPHOCYTES # BLD MANUAL: 1.17 10*3/MM3 (ref 0.7–3.1)
LYMPHOCYTES NFR BLD MANUAL: 10 % (ref 5–12)
LYMPHOCYTES NFR BLD MANUAL: 17 % (ref 19.6–45.3)
MCH RBC QN AUTO: 30.5 PG (ref 26.6–33)
MCHC RBC AUTO-ENTMCNC: 33.5 G/DL (ref 31.5–35.7)
MCV RBC AUTO: 91 FL (ref 79–97)
MONOCYTES # BLD AUTO: 0.69 10*3/MM3 (ref 0.1–0.9)
MYELOCYTES NFR BLD MANUAL: 2 % (ref 0–0)
NEUTROPHILS # BLD AUTO: 4.69 10*3/MM3 (ref 1.7–7)
NEUTROPHILS NFR BLD MANUAL: 63 % (ref 42.7–76)
NEUTS BAND NFR BLD MANUAL: 5 % (ref 0–5)
PHOSPHATE SERPL-MCNC: 2.4 MG/DL (ref 2.5–4.5)
PLAT MORPH BLD: NORMAL
PLATELET # BLD AUTO: 346 10*3/MM3 (ref 140–450)
PMV BLD AUTO: 7.4 FL (ref 6–12)
POTASSIUM BLD-SCNC: 4.3 MMOL/L (ref 3.5–5.2)
RBC # BLD AUTO: 2.87 10*6/MM3 (ref 3.77–5.28)
RBC MORPH BLD: NORMAL
SCAN SLIDE: NORMAL
SODIUM BLD-SCNC: 131 MMOL/L (ref 136–145)
TOXIC GRANULATION: ABNORMAL
WBC NRBC COR # BLD: 6.9 10*3/MM3 (ref 3.4–10.8)

## 2019-12-17 PROCEDURE — 80069 RENAL FUNCTION PANEL: CPT | Performed by: INTERNAL MEDICINE

## 2019-12-17 PROCEDURE — 97116 GAIT TRAINING THERAPY: CPT

## 2019-12-17 PROCEDURE — 94799 UNLISTED PULMONARY SVC/PX: CPT

## 2019-12-17 PROCEDURE — 85007 BL SMEAR W/DIFF WBC COUNT: CPT | Performed by: INTERNAL MEDICINE

## 2019-12-17 PROCEDURE — 25010000002 HEPARIN (PORCINE) PER 1000 UNITS: Performed by: FAMILY MEDICINE

## 2019-12-17 PROCEDURE — 25010000002 MEROPENEM PER 100 MG: Performed by: FAMILY MEDICINE

## 2019-12-17 PROCEDURE — 85025 COMPLETE CBC W/AUTO DIFF WBC: CPT | Performed by: INTERNAL MEDICINE

## 2019-12-17 PROCEDURE — 99232 SBSQ HOSP IP/OBS MODERATE 35: CPT | Performed by: FAMILY MEDICINE

## 2019-12-17 PROCEDURE — 97530 THERAPEUTIC ACTIVITIES: CPT

## 2019-12-17 RX ORDER — LABETALOL HYDROCHLORIDE 5 MG/ML
INJECTION, SOLUTION INTRAVENOUS
Status: COMPLETED
Start: 2019-12-17 | End: 2019-12-17

## 2019-12-17 RX ORDER — HYDRALAZINE HYDROCHLORIDE 25 MG/1
25 TABLET, FILM COATED ORAL EVERY 8 HOURS SCHEDULED
Status: DISCONTINUED | OUTPATIENT
Start: 2019-12-17 | End: 2019-12-18 | Stop reason: HOSPADM

## 2019-12-17 RX ADMIN — HYDRALAZINE HYDROCHLORIDE 25 MG: 25 TABLET ORAL at 10:15

## 2019-12-17 RX ADMIN — OXYCODONE HYDROCHLORIDE 10 MG: 5 TABLET ORAL at 16:57

## 2019-12-17 RX ADMIN — LEVETIRACETAM 500 MG: 500 SOLUTION ORAL at 21:08

## 2019-12-17 RX ADMIN — DAKIN'S SOLUTION 0.125% (QUARTER STRENGTH): 0.12 SOLUTION at 10:31

## 2019-12-17 RX ADMIN — SODIUM CHLORIDE TAB 1 GM 1 G: 1 TAB at 21:08

## 2019-12-17 RX ADMIN — SODIUM CHLORIDE TAB 1 GM 1 G: 1 TAB at 10:13

## 2019-12-17 RX ADMIN — LEVETIRACETAM 500 MG: 500 SOLUTION ORAL at 10:14

## 2019-12-17 RX ADMIN — HYDRALAZINE HYDROCHLORIDE 25 MG: 25 TABLET ORAL at 16:57

## 2019-12-17 RX ADMIN — OXYCODONE HYDROCHLORIDE 10 MG: 5 TABLET ORAL at 21:11

## 2019-12-17 RX ADMIN — ALBUTEROL SULFATE 2.5 MG: 2.5 SOLUTION RESPIRATORY (INHALATION) at 08:00

## 2019-12-17 RX ADMIN — HYDRALAZINE HYDROCHLORIDE 25 MG: 25 TABLET ORAL at 21:08

## 2019-12-17 RX ADMIN — CARVEDILOL 12.5 MG: 6.25 TABLET, FILM COATED ORAL at 10:29

## 2019-12-17 RX ADMIN — ACETYLCYSTEINE 2 ML: 200 SOLUTION ORAL; RESPIRATORY (INHALATION) at 19:28

## 2019-12-17 RX ADMIN — OXYCODONE HYDROCHLORIDE 10 MG: 5 TABLET ORAL at 10:12

## 2019-12-17 RX ADMIN — Medication 1 PACKET: at 12:55

## 2019-12-17 RX ADMIN — Medication 10 ML: at 21:09

## 2019-12-17 RX ADMIN — MICONAZOLE NITRATE: 20 POWDER TOPICAL at 21:10

## 2019-12-17 RX ADMIN — ACETYLCYSTEINE 2 ML: 200 SOLUTION ORAL; RESPIRATORY (INHALATION) at 08:00

## 2019-12-17 RX ADMIN — DAKIN'S SOLUTION 0.125% (QUARTER STRENGTH): 0.12 SOLUTION at 21:12

## 2019-12-17 RX ADMIN — MEROPENEM 500 MG: 500 INJECTION, POWDER, FOR SOLUTION INTRAVENOUS at 10:30

## 2019-12-17 RX ADMIN — AMITRIPTYLINE HYDROCHLORIDE 100 MG: 50 TABLET, FILM COATED ORAL at 21:08

## 2019-12-17 RX ADMIN — ALUMINUM HYDROXIDE, MAGNESIUM HYDROXIDE, AND DIMETHICONE 15 ML: 400; 400; 40 SUSPENSION ORAL at 21:08

## 2019-12-17 RX ADMIN — MEROPENEM 500 MG: 500 INJECTION, POWDER, FOR SOLUTION INTRAVENOUS at 00:37

## 2019-12-17 RX ADMIN — MEROPENEM 500 MG: 500 INJECTION, POWDER, FOR SOLUTION INTRAVENOUS at 22:10

## 2019-12-17 RX ADMIN — ALUMINUM HYDROXIDE, MAGNESIUM HYDROXIDE, AND DIMETHICONE 15 ML: 400; 400; 40 SUSPENSION ORAL at 10:14

## 2019-12-17 RX ADMIN — LABETALOL 20 MG/4 ML (5 MG/ML) INTRAVENOUS SYRINGE 10 MG: at 04:27

## 2019-12-17 RX ADMIN — Medication 1 PACKET: at 21:15

## 2019-12-17 RX ADMIN — CLOPIDOGREL BISULFATE 75 MG: 75 TABLET ORAL at 10:14

## 2019-12-17 RX ADMIN — OXYCODONE HYDROCHLORIDE 10 MG: 5 TABLET ORAL at 00:38

## 2019-12-17 RX ADMIN — ALBUTEROL SULFATE 2.5 MG: 2.5 SOLUTION RESPIRATORY (INHALATION) at 19:30

## 2019-12-17 RX ADMIN — FAMOTIDINE 20 MG: 20 TABLET, FILM COATED ORAL at 10:12

## 2019-12-17 RX ADMIN — LABETALOL HYDROCHLORIDE 10 MG: 5 INJECTION, SOLUTION INTRAVENOUS at 04:27

## 2019-12-17 RX ADMIN — MICONAZOLE NITRATE: 20 POWDER TOPICAL at 10:31

## 2019-12-17 RX ADMIN — HEPARIN SODIUM 5000 UNITS: 5000 INJECTION INTRAVENOUS; SUBCUTANEOUS at 10:15

## 2019-12-17 RX ADMIN — HEPARIN SODIUM 5000 UNITS: 5000 INJECTION INTRAVENOUS; SUBCUTANEOUS at 21:11

## 2019-12-17 RX ADMIN — ESCITALOPRAM 10 MG: 10 TABLET, FILM COATED ORAL at 10:13

## 2019-12-17 RX ADMIN — MEROPENEM 500 MG: 500 INJECTION, POWDER, FOR SOLUTION INTRAVENOUS at 16:58

## 2019-12-17 RX ADMIN — POLYETHYLENE GLYCOL 3350 17 G: 17 POWDER, FOR SOLUTION ORAL at 10:12

## 2019-12-17 RX ADMIN — Medication 10 ML: at 10:14

## 2019-12-17 RX ADMIN — CARVEDILOL 12.5 MG: 6.25 TABLET, FILM COATED ORAL at 16:58

## 2019-12-17 RX ADMIN — DEMECLOCYCLINE HYDROCHLORIDE 300 MG: 150 TABLET, FILM COATED ORAL at 10:13

## 2019-12-17 NOTE — PLAN OF CARE
Problem: Patient Care Overview  Goal: Plan of Care Review  Flowsheets (Taken 12/17/2019 1251)  Progress: no change  Plan of Care Reviewed With: patient  Outcome Summary: Pt is able to perform bed mobility and short bout of gait this session, she reports back pain being a limiting factor. She is slightly impulsive, requiring increased guarding required by therapist for safety. Continue to recommend IP rehab at d/c, however pt is insisting on d/c home with family and OP PT.  Note:

## 2019-12-17 NOTE — PROGRESS NOTES
LOS: 4 days   Patient Care Team:  Leonid Villeda Jr., MD as PCP - General  Leonid Villeda Jr., MD as PCP - Family Medicine  John Canales MD as Consulting Physician (Nephrology)    Chief Complaint: Shortness of breath    Subjective     Interval History:     The patient denied cough shortness of breath wheezing or sputum production.  States she is ready to go home.    Review of Systems   Constitutional: Positive for fatigue. Negative for chills and fever.   Respiratory: Negative for cough and shortness of breath.    Cardiovascular: Negative for chest pain and leg swelling.   Gastrointestinal: Negative for abdominal pain, diarrhea and nausea.   Skin: Negative for pallor and rash.         Objective     Vital Signs  Temp:  [97.4 °F (36.3 °C)-98.2 °F (36.8 °C)] 98.2 °F (36.8 °C)  Heart Rate:  [77-90] 90  Resp:  [16-24] 24  BP: (100-200)/() 130/26    Physical Exam   Cardiovascular: Normal rate, regular rhythm, normal heart sounds and intact distal pulses.   Pulmonary/Chest: Effort normal and breath sounds normal.   Abdominal: Soft. Bowel sounds are normal.   Musculoskeletal: Normal range of motion.   Skin: Skin is warm and dry.   Nursing note and vitals reviewed.        Results Review:    Lab Results (last 24 hours)     Procedure Component Value Units Date/Time    Blood Culture - Blood, Arm, Right [[id number]] Collected:  12/12/19 1212    Specimen:  Blood from Arm, Right Updated:  12/17/19 1230     Blood Culture No growth at 5 days    Blood Culture - Blood, Blood, Venous Line [[id number]] Collected:  12/12/19 1157    Specimen:  Blood, Venous Line Updated:  12/17/19 1216     Blood Culture No growth at 5 days    CBC & Differential [[id number]] Collected:  12/17/19 0429    Specimen:  Blood Updated:  12/17/19 0734    Narrative:       The following orders were created for panel order CBC & Differential.  Procedure                               Abnormality         Status                     ---------                                -----------         ------                     CBC Auto Differential[[id number]]        Abnormal            Final result                 Please view results for these tests on the individual orders.    CBC Auto Differential [[id number]]  (Abnormal) Collected:  12/17/19 0429    Specimen:  Blood Updated:  12/17/19 0734     WBC 6.90 10*3/mm3      RBC 2.87 10*6/mm3      Hemoglobin 8.7 g/dL      Hematocrit 26.1 %      MCV 91.0 fL      MCH 30.5 pg      MCHC 33.5 g/dL      RDW 15.6 %      RDW-SD 49.9 fl      MPV 7.4 fL      Platelets 346 10*3/mm3     Scan Slide [[id number]] Collected:  12/17/19 0429    Specimen:  Blood Updated:  12/17/19 0734     Scan Slide --     Comment: See Manual Differential Results       Manual Differential [[id number]]  (Abnormal) Collected:  12/17/19 0429    Specimen:  Blood Updated:  12/17/19 0734     Neutrophil % 63.0 %      Lymphocyte % 17.0 %      Monocyte % 10.0 %      Eosinophil % 3.0 %      Bands %  5.0 %      Myelocyte % 2.0 %      Neutrophils Absolute 4.69 10*3/mm3      Lymphocytes Absolute 1.17 10*3/mm3      Monocytes Absolute 0.69 10*3/mm3      Eosinophils Absolute 0.21 10*3/mm3      RBC Morphology Normal     Toxic Granulation Mod/2+     Platelet Morphology Normal    Renal Function Panel [[id number]]  (Abnormal) Collected:  12/17/19 0429    Specimen:  Blood Updated:  12/17/19 0547     Glucose 99 mg/dL      BUN 28 mg/dL      Creatinine 0.56 mg/dL      Sodium 131 mmol/L      Potassium 4.3 mmol/L      Chloride 92 mmol/L      CO2 30.0 mmol/L      Calcium 8.8 mg/dL      Albumin 3.00 g/dL      Phosphorus 2.4 mg/dL      Anion Gap 9.0 mmol/L      BUN/Creatinine Ratio 50.0     eGFR Non African Amer 110 mL/min/1.73     Narrative:       GFR Normal >60  Chronic Kidney Disease <60  Kidney Failure <15      Osmolality, Urine - Urine, Clean Catch [[id number]]  (Normal) Collected:  12/16/19 1311    Specimen:  Urine, Clean Catch Updated:  12/16/19 1349     Osmolality, Urine 372 mOsm/kg      Sodium, Urine, Random - Urine, Clean Catch [[id number]] Collected:  12/16/19 1311    Specimen:  Urine, Clean Catch Updated:  12/16/19 1330     Sodium, Urine 104 mmol/L     Narrative:       Reference intervals for random urine have not been established.  Clinical usage is dependent upon physician's interpretation in combination with other laboratory tests.            Imaging Results (Last 24 Hours)     ** No results found for the last 24 hours. **         ECG/EMG Results (last 24 hours)     ** No results found for the last 24 hours. **           I reviewed the patient's new clinical results.    Medication Review: I have reviewed the medications    Current Facility-Administered Medications:   •  acetylcysteine (MUCOMYST) 20 % solution 2 mL, 2 mL, Nebulization, BID - RT, Zac Rob MD, 2 mL at 12/17/19 0800  •  albuterol (PROVENTIL) nebulizer solution 0.083% 2.5 mg/3mL, 2.5 mg, Nebulization, BID - RT, Zac Rob MD, 2.5 mg at 12/17/19 0800  •  albuterol sulfate HFA (PROVENTIL HFA;VENTOLIN HFA;PROAIR HFA) inhaler 2 puff, 2 puff, Inhalation, Q4H PRN, Leonid Villeda Jr., MD  •  aluminum-magnesium hydroxide-simethicone (MAALOX MAX) 400-400-40 MG/5ML suspension 15 mL, 15 mL, Oral, BID, Leonid Villeda Jr., MD, 15 mL at 12/17/19 1014  •  amitriptyline (ELAVIL) tablet 100 mg, 100 mg, Oral, Nightly, Leonid Villeda Jr., MD, 100 mg at 12/16/19 2228  •  BENEPROTEIN packet 1 packet, 1 packet, Per G Tube, TID, Chata Bhat, RD, 1 packet at 12/17/19 1255  •  calcium carbonate (TUMS) chewable tablet 500 mg (200 mg elemental), 2 tablet, Oral, BID PRN, Leonid Villeda Jr., MD  •  carvedilol (COREG) tablet 12.5 mg, 12.5 mg, Oral, BID With Meals, Leonid Villeda Jr., MD, 12.5 mg at 12/17/19 1029  •  cloNIDine (CATAPRES-TTS) 0.3 MG/24HR patch 1 patch, 1 patch, Transdermal, Weekly, John Canales MD, 1 patch at 12/12/19 1923  •  clopidogrel (PLAVIX) tablet 75 mg, 75 mg, Oral, Daily, Leonid Villeda Jr., MD, 75 mg at  12/17/19 1014  •  cyanocobalamin injection 1,000 mcg, 1,000 mcg, Intramuscular, Q28 Days, Leonid Villeda Jr., MD, 1,000 mcg at 12/13/19 0803  •  demeclocycline (DECLOMYCIN) tablet 300 mg, 300 mg, Oral, Q12H, Leonid Villeda Jr., MD, 300 mg at 12/17/19 1013  •  escitalopram (LEXAPRO) tablet 10 mg, 10 mg, Oral, Daily, Ugo Cedillo MD, 10 mg at 12/17/19 1013  •  famotidine (PEPCID) tablet 20 mg, 20 mg, Oral, Daily, Leonid Villeda Jr., MD, 20 mg at 12/17/19 1012  •  heparin (porcine) 5000 UNIT/ML injection 5,000 Units, 5,000 Units, Subcutaneous, Q12H, Leonid Villeda Jr., MD, 5,000 Units at 12/17/19 1015  •  hydrALAZINE (APRESOLINE) tablet 25 mg, 25 mg, Oral, Q8H, John Canales MD, 25 mg at 12/17/19 1015  •  ipratropium-albuterol (DUO-NEB) nebulizer solution 3 mL, 3 mL, Nebulization, Q4H PRN, Leonid Villeda Jr., MD, 3 mL at 12/14/19 0719  •  labetalol (NORMODYNE,TRANDATE) injection 10 mg, 10 mg, Intravenous, Q6H PRN, Leonid Villeda Jr., MD, 10 mg at 12/17/19 0427  •  lactulose (CHRONULAC) 10 GM/15ML solution 10 g, 10 g, Per G Tube, BID PRN, Leonid Villeda Jr., MD  •  levETIRAcetam (KEPPRA) 100 MG/ML solution 500 mg, 500 mg, Oral, Q12H, Leonid Villeda Jr., MD, 500 mg at 12/17/19 1014  •  magnesium hydroxide (MILK OF MAGNESIA) suspension 2400 mg/10mL 10 mL, 10 mL, Oral, Daily PRN, Leonid Villeda Jr., MD  •  meropenem (MERREM) 500 mg in sodium chloride 0.9 % 100 mL IVPB, 500 mg, Intravenous, Q8H, Leonid Villeda Jr., MD, Last Rate: 0 mL/hr at 12/12/19 1830, 500 mg at 12/17/19 1030  •  miconazole (MICOTIN) 2 % powder, , Topical, Q12H, Noris Wilcox, JESSICA  •  ondansetron (ZOFRAN) injection 4 mg, 4 mg, Intravenous, Q6H PRN, Leonid Villeda Jr., MD, 4 mg at 12/16/19 1326  •  oxyCODONE (ROXICODONE) immediate release tablet 10 mg, 10 mg, Oral, 4x Daily PRN, Leonid Villeda Jr., MD, 10 mg at 12/17/19 1012  •  Pharmacy Message, 1 each, Does not apply, Once, Leonid Villeda Jr., MD  •   polyethylene glycol 3350 powder (packet), 17 g, Oral, Daily, Leonid Villeda Jr., MD, 17 g at 12/17/19 1012  •  [COMPLETED] Insert peripheral IV, , , Once **AND** sodium chloride 0.9 % flush 10 mL, 10 mL, Intravenous, PRN, Leonid Villeda Jr., MD  •  sodium chloride 0.9 % flush 10 mL, 10 mL, Intravenous, Q12H, Leonid Villeda Jr., MD, 10 mL at 12/17/19 1014  •  sodium chloride 0.9 % flush 10 mL, 10 mL, Intravenous, PRN, Leonid Villeda Jr., MD  •  sodium chloride tablet 1 g, 1 g, Oral, BID, Leonid Villeda Jr., MD, 1 g at 12/17/19 1013  •  sodium hypochlorite (DAKIN'S) 0.125 % topical solution 0.125% (quarter strength), , Topical, Q12H, Leonid Villeda Jr., MD     At the present time discharge plans are for patient to go home with outpatient physical therapy.  No facilities that have been explored will allow family members to bring in feedings from home.    Principal Problem:    Pneumonia due to infectious organism-stable    Active Problems:    Dysphagia, pharyngoesophageal phase-unchanged      Decubitus ulcer of ischial area, right, stage IV (CMS/HCC)-stable      Closed displaced fracture of acromial end of left clavicle       Continue current treatment.    Assessment & Plan     Plan for disposition:Where: home and home health    Leonid Villeda Jr., MD  12/17/19  1:10 PM

## 2019-12-17 NOTE — PROGRESS NOTES
"NEPHROLOGY PROGRESS NOTE------[facility]    [facility]  [phone]  John Canales MD      Patient Care Team:  Leonid Villeda Jr., MD as PCP - General  Leonid Villeda Jr., MD as PCP - Family Medicine  John Canales MD as Consulting Physician (Nephrology)      Provider:  John Canales MD  Patient Name: Sri Bobo  :  1960    SUBJECTIVE:  F/u HTN  No cp/soa  Feels better    Medication:    acetylcysteine 2 mL Nebulization BID - RT   albuterol 2.5 mg Nebulization BID - RT   aluminum-magnesium hydroxide-simethicone 15 mL Oral BID   amitriptyline 100 mg Oral Nightly   BENEPROTEIN 1 packet Per G Tube TID   carvedilol 12.5 mg Oral BID With Meals   cloNIDine 1 patch Transdermal Weekly   clopidogrel 75 mg Oral Daily   cyanocobalamin 1,000 mcg Intramuscular Q28 Days   demeclocycline 300 mg Oral Q12H   escitalopram 10 mg Oral Daily   famotidine 20 mg Oral Daily   heparin (porcine) 5,000 Units Subcutaneous Q12H   hydrALAZINE 25 mg Oral Q8H   levETIRAcetam 500 mg Oral Q12H   meropenem 500 mg Intravenous Q8H   miconazole  Topical Q12H   pharmacy 1 each Does not apply Once   polyethylene glycol 17 g Oral Daily   sodium chloride 10 mL Intravenous Q12H   sodium chloride 1 g Oral BID   sodium hypochlorite  Topical Q12H          OBJECTIVE    Vital Sign Min/Max for last 24 hours  Temp  Min: 97.4 °F (36.3 °C)  Max: 98.3 °F (36.8 °C)   BP  Min: 100/61  Max: 200/103   Pulse  Min: 77  Max: 92   Resp  Min: 16  Max: 24   SpO2  Min: 79 %  Max: 99 %   No data recorded   Weight  Min: 42 kg (92 lb 9.5 oz)  Max: 42 kg (92 lb 9.5 oz)     Flowsheet Rows      First Filed Value   Admission Height  157.5 cm (62\") Documented at 2019   Admission Weight  39.5 kg (87 lb 1.3 oz) Documented at 2019 09          No intake/output data recorded.  I/O last 3 completed shifts:  In: 0   Out: 4500 [Urine:4500]    Physical Exam:  General Appearance: alert, appears stated age and " cooperative  Head: normocephalic, without obvious abnormality and atraumatic  Eyes: conjunctivae and sclerae normal and no icterus  Neck: supple and no JVD  Lungs: clear to auscultation and respirations regular  Heart: regular rhythm & normal rate and normal S1, S2  Chest: Wall no abnormalities observed  Abdomen: normal bowel sounds and soft non-tender  Extremities: moves extremities well, no edema, no cyanosis and no redness  Skin: no bleeding, bruising or rash, turgor normal, color normal and no lesions noted  Neurologic: Alert, and oriented. No focal deficits    Labs:    WBC WBC   Date Value Ref Range Status   12/17/2019 6.90 3.40 - 10.80 10*3/mm3 Final   12/16/2019 6.80 3.40 - 10.80 10*3/mm3 Final   12/15/2019 6.20 3.40 - 10.80 10*3/mm3 Final      HGB Hemoglobin   Date Value Ref Range Status   12/17/2019 8.7 (L) 12.0 - 15.9 g/dL Final   12/16/2019 8.1 (L) 12.0 - 15.9 g/dL Final   12/15/2019 7.9 (L) 12.0 - 15.9 g/dL Final      HCT Hematocrit   Date Value Ref Range Status   12/17/2019 26.1 (L) 34.0 - 46.6 % Final   12/16/2019 24.7 (L) 34.0 - 46.6 % Final   12/15/2019 23.5 (L) 34.0 - 46.6 % Final      Platlets No results found for: LABPLAT   MCV MCV   Date Value Ref Range Status   12/17/2019 91.0 79.0 - 97.0 fL Final   12/16/2019 91.2 79.0 - 97.0 fL Final   12/15/2019 92.2 79.0 - 97.0 fL Final          Sodium Sodium   Date Value Ref Range Status   12/17/2019 131 (L) 136 - 145 mmol/L Final   12/16/2019 131 (L) 136 - 145 mmol/L Final   12/15/2019 136 136 - 145 mmol/L Final      Potassium Potassium   Date Value Ref Range Status   12/17/2019 4.3 3.5 - 5.2 mmol/L Final   12/16/2019 4.1 3.5 - 5.2 mmol/L Final   12/15/2019 4.0 3.5 - 5.2 mmol/L Final      Chloride Chloride   Date Value Ref Range Status   12/17/2019 92 (L) 98 - 107 mmol/L Final   12/16/2019 97 (L) 98 - 107 mmol/L Final   12/15/2019 103 98 - 107 mmol/L Final      CO2 CO2   Date Value Ref Range Status   12/17/2019 30.0 (H) 22.0 - 29.0 mmol/L Final    12/16/2019 27.0 22.0 - 29.0 mmol/L Final   12/15/2019 28.0 22.0 - 29.0 mmol/L Final      BUN BUN   Date Value Ref Range Status   12/17/2019 28 (H) 8 - 23 mg/dL Final   12/16/2019 21 8 - 23 mg/dL Final   12/15/2019 16 8 - 23 mg/dL Final      Creatinine Creatinine   Date Value Ref Range Status   12/17/2019 0.56 (L) 0.57 - 1.00 mg/dL Final   12/16/2019 0.62 0.57 - 1.00 mg/dL Final   12/15/2019 0.48 (L) 0.57 - 1.00 mg/dL Final      Calcium Calcium   Date Value Ref Range Status   12/17/2019 8.8 8.6 - 10.5 mg/dL Final   12/16/2019 8.5 (L) 8.6 - 10.5 mg/dL Final   12/15/2019 8.6 8.6 - 10.5 mg/dL Final      PO4 No components found for: PO4   Albumin Albumin   Date Value Ref Range Status   12/17/2019 3.00 (L) 3.50 - 5.20 g/dL Final   12/16/2019 2.80 (L) 3.50 - 5.20 g/dL Final   12/15/2019 2.90 (L) 3.50 - 5.20 g/dL Final      Magnesium No results found for: MG   Uric Acid No components found for: URIC ACID     Imaging Results (Last 72 Hours)     ** No results found for the last 72 hours. **          Results for orders placed during the hospital encounter of 12/12/19   XR Chest 2 View    Narrative DATE OF EXAM:  12/12/2019 10:48 AM     PROCEDURE:  XR CHEST 2 VW-     INDICATIONS:  cough       COMPARISON:  CT chest 11/26/2019. AP portable chest 11/25/2019.     TECHNIQUE:   Two radiologic views of the chest.     FINDINGS:  Right lower lobe airspace disease persists, and appears fairly similar  to the 11/25/2019 examination. There is new ill-defined airspace disease  in the left lower lobe.     Stable cardiac enlargement. Left chest wall luis catheter remains at  the cavoatrial junction. No visible pneumothorax. Surgical changes of  the right humerus. Degenerative changes of the left humerus. Chronic  appearing deformity of the distal left clavicle. Diffuse osteopenia.     There is a chronic appearing severe compression fracture of the T5  vertebral body, chronic appearing compression fracture of the superior  endplate of T4,  corresponding to the CT chest from 11/26/2019. There is,  however, an age-indeterminate compression fracture of T8, new since  11/26/2019 CT chest.       Impression    1. Persistent right lower lobe airspace disease worrisome for pneumonia.  New left lower lobe airspace disease worrisome for developing pneumonia.  2. Age indeterminate compression fracture of T8, new since 11/26/2019  comparison. Chronic T4 and T5 compression fractures.     Electronically Signed By-Dr. Ashley Jerez MD On:12/12/2019 11:18 AM  This report was finalized on [id number] by Dr. Ashley Jerez MD.   XR Spine Lumbar Complete 4+VW    Narrative XR SPINE LUMBAR COMPLETE 4+VW-     Date of Exam: 12/12/2019 10:22 AM     Indication: Low back pain after fall.     Comparison Exams: September 6, 2017     Technique: 5 radiographs of the lumbar spine     FINDINGS:  No acute fracture is identified. There is generalized osteopenia. The  alignment appears stable, with grade 1/2 anterolisthesis of L4 on L5.  The vertebral body heights appear normal. There is moderate degenerative  loss of disc height at L4-5 and L5-S1. There is mild to moderate facet  arthropathy at L3-4 through L5-S1. The soft tissues are unremarkable.       Impression 1.No acute fracture or traumatic subluxation of lumbar spine identified.  2.Degenerative changes as described above.     Electronically Signed By-DR. Jevon Dominguez MD On:12/12/2019 11:16 AM  This report was finalized on [id number] by DR. Jevon Dominguez MD.              ASSESSMENT / PLAN      Pneumonia due to infectious organism    Dysphagia, pharyngoesophageal phase    Decubitus ulcer of ischial area, right, stage IV (CMS/HCC)    Closed displaced fracture of acromial end of left clavicle    · Hypertension--better. Clonidine patch was applied.   · Hyponatremia, mild--probably due to SIADH. On demeclocycline. TSH 1.5. Usom 370  · Hx tonsillar cancer  · Nutrition--s/p g-tube replacement  · PNA--on ATBx     Cr and  sodium stable  BP high, add hydralazine  ATBx per eufemia Canales MD  [facility]  [phone]  12/17/19  8:02 AM

## 2019-12-17 NOTE — PROGRESS NOTES
Continued Stay Note  ALFREDO Lindquist     Patient Name: Sri Bobo  MRN: [id number]  Today's Date: 12/17/2019    Admit Date: 12/12/2019    Discharge Plan     Row Name 12/17/19 1524       Plan    Plan Comments  spoke with [facility] today and they let me know that pt only got 6 doses of her iv antibitotic and she was suppose to have 10 . Dr. Villeda notified and i have a calll out to pt's daughter .        Discharge Codes    No documentation.             Sugar Moreno RN

## 2019-12-17 NOTE — PLAN OF CARE
Pt up to chair for part of day. Very alert and doing some things for self when up. One assist to bsc. Plan is to discharge on oral antibiotics tomorrow. 12/17/19 6296

## 2019-12-17 NOTE — PROGRESS NOTES
Daily Progress Note        Pneumonia due to infectious organism    Dysphagia, pharyngoesophageal phase    Decubitus ulcer of ischial area, right, stage IV (CMS/HCC)    Closed displaced fracture of acromial end of left clavicle      Assessment     rhino virus positive     CT chest  showed  Persistent consolidation right lower lobe and mild Bronchiectasis,  Overall  Compatible with chronic aspiration and not  Changed from prior CT scan    Patient currently on room air denies any shortness of breath from baseline     Sputum cultures mixed respiratory suzy    Wound culture showed light growth of E. coli ESBL and enterococcus species      past medical history significant for:  bronchoscopy 03/27/2019 showed E coli with ESBL treated with meropenem  bronchoscopy 01/25/2019 AFB cultures negative  CT scan 01/17/2019 showed right lower lobe obstruction with mucus plug or tumor   bronchoscopy July 2018 MRSA pneumonia  Right upper lobe nodules,  CT scan of the chest  08/24/2018 compared to 07/26/2018 just showed decreased size  favoring infectious process  Status post a fall and right wrist fracture  Patient was treated recently for MRSA  Right lower lobe pneumonia with IV vancomycin  CT scan of the chest 07/26/2018 showed 2 right upper lobe lung nodules suspicious for malignancy     Bronchoscopy 07/27/2018 for coughing up blood however there was No evidence of active hemoptysis  Moderate to severe mucopurulent secretions were noted in the right lower lobe and right upper lobe  FNA from RLL negative cytology, BAL Rt lung negative cytology  History of left tonsillar cancer in 2013, status post PEG tube  Status post radiation     Status post EGD 10/03/2017 no obvious source of bleeding  BRONCHOSCOPY 10/05/2017, no hemoptysis, No endobronchial lesions  Chronic inflammatory changes cultures were positive for group B streptococcal pneumonia     seizure disorder  Osteoporosis  Peripheral vascular disease  Protein calorie  malnutrition        Plan  Antibiotics  Currently on  meropenem     Mucomyst nebulized mixed with albuterol                      LOS: 3 days     Subjective         Objective     Vital signs for last 24 hours:  Vitals:    12/16/19 1041 12/16/19 1449 12/16/19 1754 12/16/19 1900   BP: 151/91 100/61 153/83    BP Location:       Patient Position:       Pulse: 91   77   Resp:  18 16 16   Temp:  98 °F (36.7 °C) 98 °F (36.7 °C)    TempSrc:  Oral Oral    SpO2:  92% 94% 93%   Weight:       Height:           Intake/Output last 3 shifts:  I/O last 3 completed shifts:  In: 450 [Other:250; NG/GT:200]  Out: 2700 [Urine:2700]  Intake/Output this shift:  No intake/output data recorded.      Radiology  Imaging Results (Last 24 Hours)     ** No results found for the last 24 hours. **          Labs:  Results from last 7 days   Lab Units 12/16/19 0518   WBC 10*3/mm3 6.80   HEMOGLOBIN g/dL 8.1*   HEMATOCRIT % 24.7*   PLATELETS 10*3/mm3 325     Results from last 7 days   Lab Units 12/16/19 0518  12/13/19  0334   SODIUM mmol/L 131*   < > 136   POTASSIUM mmol/L 4.1   < > 3.7   CHLORIDE mmol/L 97*   < > 96*   CO2 mmol/L 27.0   < > 26.0   BUN mg/dL 21   < > 23   CREATININE mg/dL 0.62   < > 0.69   CALCIUM mg/dL 8.5*   < > 8.6   BILIRUBIN mg/dL  --   --  0.4   ALK PHOS U/L  --   --  85   ALT (SGPT) U/L  --   --  8   AST (SGOT) U/L  --   --  14   GLUCOSE mg/dL 99   < > 89    < > = values in this interval not displayed.         Results from last 7 days   Lab Units 12/16/19  0518 12/15/19  0519 12/14/19  0611   ALBUMIN g/dL 2.80* 2.90* 2.90*             Results from last 7 days   Lab Units 12/14/19  0611   MAGNESIUM mg/dL 1.7         Results from last 7 days   Lab Units 12/16/19  0518   TSH uIU/mL 1.150           Meds:   SCHEDULE    acetylcysteine 2 mL Nebulization BID - RT   albuterol 2.5 mg Nebulization BID - RT   aluminum-magnesium hydroxide-simethicone 15 mL Oral BID   amitriptyline 100 mg Oral Nightly   BENEPROTEIN 1 packet Per G Tube TID    carvedilol 12.5 mg Oral BID With Meals   cloNIDine 1 patch Transdermal Weekly   clopidogrel 75 mg Oral Daily   cyanocobalamin 1,000 mcg Intramuscular Q28 Days   demeclocycline 300 mg Oral Q12H   escitalopram 10 mg Oral Daily   famotidine 20 mg Oral Daily   heparin (porcine) 5,000 Units Subcutaneous Q12H   levETIRAcetam 500 mg Oral Q12H   meropenem 500 mg Intravenous Q8H   miconazole  Topical Q12H   pharmacy 1 each Does not apply Once   polyethylene glycol 17 g Oral Daily   sodium chloride 10 mL Intravenous Q12H   sodium chloride 1 g Oral BID   sodium hypochlorite  Topical Q12H     Infusions     PRNs  albuterol sulfate HFA  •  calcium carbonate  •  ipratropium-albuterol  •  labetalol  •  lactulose  •  magnesium hydroxide  •  ondansetron  •  oxyCODONE  •  [COMPLETED] Insert peripheral IV **AND** sodium chloride  •  sodium chloride    Physical Exam:  Physical Exam   Cardiovascular:   Murmur heard.  Pulmonary/Chest: No respiratory distress. She has wheezes. She has rales. She exhibits no tenderness.   Abdominal: She exhibits no distension. There is no tenderness.   Musculoskeletal: She exhibits no edema.       ROS  Review of Systems   HENT: Positive for congestion, rhinorrhea and sneezing.    Respiratory: Positive for cough. Negative for apnea, choking, chest tightness, shortness of breath and wheezing.    Cardiovascular: Negative for leg swelling.             Total time spent with patient greater than: 1 Hour

## 2019-12-17 NOTE — PLAN OF CARE
Pt able to ambulate to the [facility] but lacks strength to otherwise assist in care. Pt complains of pain when ambulating.  Family provides tube feeds and pt is tolerating that well.  O2 stats decreased overnight and pt required 2L NC.

## 2019-12-18 VITALS
HEIGHT: 62 IN | WEIGHT: 91.49 LBS | RESPIRATION RATE: 16 BRPM | OXYGEN SATURATION: 97 % | TEMPERATURE: 97.5 F | HEART RATE: 75 BPM | SYSTOLIC BLOOD PRESSURE: 146 MMHG | DIASTOLIC BLOOD PRESSURE: 77 MMHG | BODY MASS INDEX: 16.84 KG/M2

## 2019-12-18 LAB
ALBUMIN SERPL-MCNC: 2.8 G/DL (ref 3.5–5.2)
ANION GAP SERPL CALCULATED.3IONS-SCNC: 8 MMOL/L (ref 5–15)
BUN BLD-MCNC: 33 MG/DL (ref 8–23)
BUN/CREAT SERPL: 54.1 (ref 7–25)
CALCIUM SPEC-SCNC: 9 MG/DL (ref 8.6–10.5)
CHLORIDE SERPL-SCNC: 93 MMOL/L (ref 98–107)
CO2 SERPL-SCNC: 32 MMOL/L (ref 22–29)
CREAT BLD-MCNC: 0.61 MG/DL (ref 0.57–1)
DEPRECATED RDW RBC AUTO: 49.9 FL (ref 37–54)
EOSINOPHIL # BLD MANUAL: 0.11 10*3/MM3 (ref 0–0.4)
EOSINOPHIL NFR BLD MANUAL: 2 % (ref 0.3–6.2)
ERYTHROCYTE [DISTWIDTH] IN BLOOD BY AUTOMATED COUNT: 15.5 % (ref 12.3–15.4)
GFR SERPL CREATININE-BSD FRML MDRD: 100 ML/MIN/1.73
GLUCOSE BLD-MCNC: 93 MG/DL (ref 65–99)
HCT VFR BLD AUTO: 24.8 % (ref 34–46.6)
HGB BLD-MCNC: 8.4 G/DL (ref 12–15.9)
LYMPHOCYTES # BLD MANUAL: 1.22 10*3/MM3 (ref 0.7–3.1)
LYMPHOCYTES NFR BLD MANUAL: 23 % (ref 19.6–45.3)
LYMPHOCYTES NFR BLD MANUAL: 3 % (ref 5–12)
MCH RBC QN AUTO: 30.7 PG (ref 26.6–33)
MCHC RBC AUTO-ENTMCNC: 33.9 G/DL (ref 31.5–35.7)
MCV RBC AUTO: 90.6 FL (ref 79–97)
MONOCYTES # BLD AUTO: 0.16 10*3/MM3 (ref 0.1–0.9)
NEUTROPHILS # BLD AUTO: 3.66 10*3/MM3 (ref 1.7–7)
NEUTROPHILS NFR BLD MANUAL: 69 % (ref 42.7–76)
PHOSPHATE SERPL-MCNC: 3.1 MG/DL (ref 2.5–4.5)
PLAT MORPH BLD: NORMAL
PLATELET # BLD AUTO: 337 10*3/MM3 (ref 140–450)
PMV BLD AUTO: 7 FL (ref 6–12)
POTASSIUM BLD-SCNC: 4.2 MMOL/L (ref 3.5–5.2)
RBC # BLD AUTO: 2.73 10*6/MM3 (ref 3.77–5.28)
RBC MORPH BLD: NORMAL
SCAN SLIDE: NORMAL
SODIUM BLD-SCNC: 133 MMOL/L (ref 136–145)
VARIANT LYMPHS NFR BLD MANUAL: 3 % (ref 0–5)
WBC MORPH BLD: NORMAL
WBC NRBC COR # BLD: 5.3 10*3/MM3 (ref 3.4–10.8)

## 2019-12-18 PROCEDURE — 94799 UNLISTED PULMONARY SVC/PX: CPT

## 2019-12-18 PROCEDURE — 80069 RENAL FUNCTION PANEL: CPT | Performed by: INTERNAL MEDICINE

## 2019-12-18 PROCEDURE — 85025 COMPLETE CBC W/AUTO DIFF WBC: CPT | Performed by: INTERNAL MEDICINE

## 2019-12-18 PROCEDURE — 25010000002 MEROPENEM PER 100 MG: Performed by: FAMILY MEDICINE

## 2019-12-18 PROCEDURE — 85007 BL SMEAR W/DIFF WBC COUNT: CPT | Performed by: INTERNAL MEDICINE

## 2019-12-18 PROCEDURE — 25010000002 HEPARIN (PORCINE) PER 1000 UNITS: Performed by: FAMILY MEDICINE

## 2019-12-18 PROCEDURE — 99238 HOSP IP/OBS DSCHRG MGMT 30/<: CPT | Performed by: FAMILY MEDICINE

## 2019-12-18 RX ORDER — AMOXICILLIN 250 MG/5ML
750 POWDER, FOR SUSPENSION ORAL 2 TIMES DAILY
Qty: 300 ML | Refills: 0 | Status: SHIPPED | OUTPATIENT
Start: 2019-12-18

## 2019-12-18 RX ORDER — ESCITALOPRAM OXALATE 10 MG/1
10 TABLET ORAL DAILY
Qty: 30 TABLET | Refills: 2 | Status: SHIPPED | OUTPATIENT
Start: 2019-12-19

## 2019-12-18 RX ADMIN — POLYETHYLENE GLYCOL 3350 17 G: 17 POWDER, FOR SOLUTION ORAL at 08:42

## 2019-12-18 RX ADMIN — ALUMINUM HYDROXIDE, MAGNESIUM HYDROXIDE, AND DIMETHICONE 15 ML: 400; 400; 40 SUSPENSION ORAL at 08:41

## 2019-12-18 RX ADMIN — FAMOTIDINE 20 MG: 20 TABLET, FILM COATED ORAL at 08:41

## 2019-12-18 RX ADMIN — HEPARIN SODIUM (PORCINE) LOCK FLUSH IV SOLN 100 UNIT/ML 500 UNITS: 100 SOLUTION at 15:08

## 2019-12-18 RX ADMIN — OXYCODONE HYDROCHLORIDE 10 MG: 5 TABLET ORAL at 08:42

## 2019-12-18 RX ADMIN — Medication 1 PACKET: at 08:47

## 2019-12-18 RX ADMIN — HYDRALAZINE HYDROCHLORIDE 25 MG: 25 TABLET ORAL at 15:08

## 2019-12-18 RX ADMIN — ALBUTEROL SULFATE 2.5 MG: 2.5 SOLUTION RESPIRATORY (INHALATION) at 07:18

## 2019-12-18 RX ADMIN — HYDRALAZINE HYDROCHLORIDE 25 MG: 25 TABLET ORAL at 05:20

## 2019-12-18 RX ADMIN — HEPARIN SODIUM 5000 UNITS: 5000 INJECTION INTRAVENOUS; SUBCUTANEOUS at 08:42

## 2019-12-18 RX ADMIN — LEVETIRACETAM 500 MG: 500 SOLUTION ORAL at 08:41

## 2019-12-18 RX ADMIN — ACETYLCYSTEINE 4 ML: 200 SOLUTION ORAL; RESPIRATORY (INHALATION) at 07:18

## 2019-12-18 RX ADMIN — MICONAZOLE NITRATE: 20 POWDER TOPICAL at 08:46

## 2019-12-18 RX ADMIN — OXYCODONE HYDROCHLORIDE 10 MG: 5 TABLET ORAL at 15:08

## 2019-12-18 RX ADMIN — ESCITALOPRAM 10 MG: 10 TABLET, FILM COATED ORAL at 08:41

## 2019-12-18 RX ADMIN — MEROPENEM 500 MG: 500 INJECTION, POWDER, FOR SOLUTION INTRAVENOUS at 11:16

## 2019-12-18 RX ADMIN — CARVEDILOL 12.5 MG: 6.25 TABLET, FILM COATED ORAL at 08:41

## 2019-12-18 RX ADMIN — CLOPIDOGREL BISULFATE 75 MG: 75 TABLET ORAL at 08:41

## 2019-12-18 RX ADMIN — MEROPENEM 500 MG: 500 INJECTION, POWDER, FOR SOLUTION INTRAVENOUS at 05:20

## 2019-12-18 RX ADMIN — Medication 10 ML: at 08:46

## 2019-12-18 RX ADMIN — SODIUM CHLORIDE TAB 1 GM 1 G: 1 TAB at 08:41

## 2019-12-18 RX ADMIN — DAKIN'S SOLUTION 0.125% (QUARTER STRENGTH): 0.12 SOLUTION at 08:46

## 2019-12-18 NOTE — PROGRESS NOTES
Daily Progress Note        Pneumonia due to infectious organism    Dysphagia, pharyngoesophageal phase    Decubitus ulcer of ischial area, right, stage IV (CMS/HCC)    Closed displaced fracture of acromial end of left clavicle      Assessment     rhino virus positive     CT chest  showed  Persistent consolidation right lower lobe and mild Bronchiectasis,  Overall  Compatible with chronic aspiration and not  Changed from prior CT scan    Patient currently on room air denies any shortness of breath from baseline     Sputum cultures mixed respiratory suzy    Wound culture showed light growth of E. coli ESBL and enterococcus species      past medical history significant for:  bronchoscopy 03/27/2019 showed E coli with ESBL treated with meropenem  bronchoscopy 01/25/2019 AFB cultures negative  CT scan 01/17/2019 showed right lower lobe obstruction with mucus plug or tumor   bronchoscopy July 2018 MRSA pneumonia  Right upper lobe nodules,  CT scan of the chest  08/24/2018 compared to 07/26/2018 just showed decreased size  favoring infectious process  Status post a fall and right wrist fracture  Patient was treated recently for MRSA  Right lower lobe pneumonia with IV vancomycin  CT scan of the chest 07/26/2018 showed 2 right upper lobe lung nodules suspicious for malignancy     Bronchoscopy 07/27/2018 for coughing up blood however there was No evidence of active hemoptysis  Moderate to severe mucopurulent secretions were noted in the right lower lobe and right upper lobe  FNA from RLL negative cytology, BAL Rt lung negative cytology  History of left tonsillar cancer in 2013, status post PEG tube  Status post radiation     Status post EGD 10/03/2017 no obvious source of bleeding  BRONCHOSCOPY 10/05/2017, no hemoptysis, No endobronchial lesions  Chronic inflammatory changes cultures were positive for group B streptococcal pneumonia     seizure disorder  Osteoporosis  Peripheral vascular disease  Protein calorie  malnutrition        Plan  Antibiotics  Currently on  meropenem     Mucomyst nebulized mixed with albuterol                      LOS: 4 days     Subjective         Objective     Vital signs for last 24 hours:  Vitals:    12/17/19 1533 12/17/19 1819 12/17/19 1929 12/17/19 1940   BP: 97/53 168/81     BP Location: Left arm Left arm     Patient Position: Lying Lying     Pulse: 82 86 77 76   Resp: 22 20 20 20   Temp: 97.7 °F (36.5 °C) 97.9 °F (36.6 °C)     TempSrc: Axillary Axillary     SpO2: 93% 100% 98% 99%   Weight:       Height:           Intake/Output last 3 shifts:  I/O last 3 completed shifts:  In: 0   Out: 3500 [Urine:3500]  Intake/Output this shift:  No intake/output data recorded.      Radiology  Imaging Results (Last 24 Hours)     ** No results found for the last 24 hours. **          Labs:  Results from last 7 days   Lab Units 12/17/19 0429   WBC 10*3/mm3 6.90   HEMOGLOBIN g/dL 8.7*   HEMATOCRIT % 26.1*   PLATELETS 10*3/mm3 346     Results from last 7 days   Lab Units 12/17/19 0429 12/13/19  0334   SODIUM mmol/L 131*   < > 136   POTASSIUM mmol/L 4.3   < > 3.7   CHLORIDE mmol/L 92*   < > 96*   CO2 mmol/L 30.0*   < > 26.0   BUN mg/dL 28*   < > 23   CREATININE mg/dL 0.56*   < > 0.69   CALCIUM mg/dL 8.8   < > 8.6   BILIRUBIN mg/dL  --   --  0.4   ALK PHOS U/L  --   --  85   ALT (SGPT) U/L  --   --  8   AST (SGOT) U/L  --   --  14   GLUCOSE mg/dL 99   < > 89    < > = values in this interval not displayed.         Results from last 7 days   Lab Units 12/17/19  0429 12/16/19  0518 12/15/19  0519   ALBUMIN g/dL 3.00* 2.80* 2.90*             Results from last 7 days   Lab Units 12/14/19  0611   MAGNESIUM mg/dL 1.7         Results from last 7 days   Lab Units 12/16/19  0518   TSH uIU/mL 1.150           Meds:   SCHEDULE    acetylcysteine 2 mL Nebulization BID - RT   albuterol 2.5 mg Nebulization BID - RT   aluminum-magnesium hydroxide-simethicone 15 mL Oral BID   amitriptyline 100 mg Oral Nightly   BENEPROTEIN 1  packet Per G Tube TID   carvedilol 12.5 mg Oral BID With Meals   cloNIDine 1 patch Transdermal Weekly   clopidogrel 75 mg Oral Daily   cyanocobalamin 1,000 mcg Intramuscular Q28 Days   escitalopram 10 mg Oral Daily   famotidine 20 mg Oral Daily   heparin (porcine) 5,000 Units Subcutaneous Q12H   hydrALAZINE 25 mg Oral Q8H   levETIRAcetam 500 mg Oral Q12H   meropenem 500 mg Intravenous Q6H   miconazole  Topical Q12H   pharmacy 1 each Does not apply Once   polyethylene glycol 17 g Oral Daily   sodium chloride 10 mL Intravenous Q12H   sodium chloride 1 g Oral BID   sodium hypochlorite  Topical Q12H     Infusions     PRNs  albuterol sulfate HFA  •  calcium carbonate  •  ipratropium-albuterol  •  labetalol  •  lactulose  •  magnesium hydroxide  •  ondansetron  •  oxyCODONE  •  [COMPLETED] Insert peripheral IV **AND** sodium chloride  •  sodium chloride    Physical Exam:  Physical Exam   Cardiovascular:   Murmur heard.  Pulmonary/Chest: No respiratory distress. She has wheezes. She has rales. She exhibits no tenderness.   Abdominal: She exhibits no distension. There is no tenderness.   Musculoskeletal: She exhibits no edema.       ROS  Review of Systems   HENT: Positive for congestion, rhinorrhea and sneezing.    Respiratory: Positive for cough. Negative for apnea, choking, chest tightness, shortness of breath and wheezing.    Cardiovascular: Negative for leg swelling.             Total time spent with patient greater than: 1 Hour

## 2019-12-18 NOTE — PROGRESS NOTES
"NEPHROLOGY PROGRESS NOTE------[facility]    [facility]  [phone]  Paola Torres MD      Patient Care Team:  Leonid Villeda Jr., MD as PCP - General  Leonid Villeda Jr., MD as PCP - Family Medicine  John Canales MD as Consulting Physician (Nephrology)      Provider:  Paola Torres MD  Patient Name: Sri Bobo  :  1960    SUBJECTIVE:  F/u HTN    No SOB, CP, palpitations, cramping.    Medication:    acetylcysteine 2 mL Nebulization BID - RT   albuterol 2.5 mg Nebulization BID - RT   aluminum-magnesium hydroxide-simethicone 15 mL Oral BID   amitriptyline 100 mg Oral Nightly   BENEPROTEIN 1 packet Per G Tube TID   carvedilol 12.5 mg Oral BID With Meals   cloNIDine 1 patch Transdermal Weekly   clopidogrel 75 mg Oral Daily   cyanocobalamin 1,000 mcg Intramuscular Q28 Days   escitalopram 10 mg Oral Daily   famotidine 20 mg Oral Daily   heparin (porcine) 5,000 Units Subcutaneous Q12H   hydrALAZINE 25 mg Oral Q8H   levETIRAcetam 500 mg Oral Q12H   meropenem 500 mg Intravenous Q6H   miconazole  Topical Q12H   pharmacy 1 each Does not apply Once   polyethylene glycol 17 g Oral Daily   sodium chloride 10 mL Intravenous Q12H   sodium chloride 1 g Oral BID   sodium hypochlorite  Topical Q12H          OBJECTIVE    Vital Sign Min/Max for last 24 hours  Temp  Min: 97.2 °F (36.2 °C)  Max: 98.9 °F (37.2 °C)   BP  Min: 92/48  Max: 168/81   Pulse  Min: 70  Max: 90   Resp  Min: 14  Max: 24   SpO2  Min: 93 %  Max: 100 %   No data recorded   Weight  Min: 41.5 kg (91 lb 7.9 oz)  Max: 41.5 kg (91 lb 7.9 oz)     Flowsheet Rows      First Filed Value   Admission Height  157.5 cm (62\") Documented at 2019   Admission Weight  39.5 kg (87 lb 1.3 oz) Documented at 2019 09          No intake/output data recorded.  I/O last 3 completed shifts:  In: -   Out: 3550 [Urine:3550]    Physical Exam:  General Appearance: alert, appears stated age and " cooperative  Head: normocephalic, without obvious abnormality and atraumatic  Eyes: conjunctivae and sclerae normal and no icterus  Neck: supple and no JVD  Lungs: +SCATTERED RHONCHI  Heart: regular rhythm & normal rate and normal S1, S2 +JAVED  Chest: Wall no abnormalities observed  Abdomen: normal bowel sounds and soft non-tender +G-TUBE  Extremities: moves extremities well, no edema, no cyanosis and no redness  Skin: no bleeding, bruising or rash, turgor normal, color normal and no lesions noted +DJD  Neurologic: Alert, and oriented. No focal deficits    Labs:    WBC WBC   Date Value Ref Range Status   12/18/2019 5.30 3.40 - 10.80 10*3/mm3 Final   12/17/2019 6.90 3.40 - 10.80 10*3/mm3 Final   12/16/2019 6.80 3.40 - 10.80 10*3/mm3 Final      HGB Hemoglobin   Date Value Ref Range Status   12/18/2019 8.4 (L) 12.0 - 15.9 g/dL Final   12/17/2019 8.7 (L) 12.0 - 15.9 g/dL Final   12/16/2019 8.1 (L) 12.0 - 15.9 g/dL Final      HCT Hematocrit   Date Value Ref Range Status   12/18/2019 24.8 (L) 34.0 - 46.6 % Final   12/17/2019 26.1 (L) 34.0 - 46.6 % Final   12/16/2019 24.7 (L) 34.0 - 46.6 % Final      Platlets No results found for: LABPLAT   MCV MCV   Date Value Ref Range Status   12/18/2019 90.6 79.0 - 97.0 fL Final   12/17/2019 91.0 79.0 - 97.0 fL Final   12/16/2019 91.2 79.0 - 97.0 fL Final          Sodium Sodium   Date Value Ref Range Status   12/18/2019 133 (L) 136 - 145 mmol/L Final   12/17/2019 131 (L) 136 - 145 mmol/L Final   12/16/2019 131 (L) 136 - 145 mmol/L Final      Potassium Potassium   Date Value Ref Range Status   12/18/2019 4.2 3.5 - 5.2 mmol/L Final   12/17/2019 4.3 3.5 - 5.2 mmol/L Final   12/16/2019 4.1 3.5 - 5.2 mmol/L Final      Chloride Chloride   Date Value Ref Range Status   12/18/2019 93 (L) 98 - 107 mmol/L Final   12/17/2019 92 (L) 98 - 107 mmol/L Final   12/16/2019 97 (L) 98 - 107 mmol/L Final      CO2 CO2   Date Value Ref Range Status   12/18/2019 32.0 (H) 22.0 - 29.0 mmol/L Final   12/17/2019  30.0 (H) 22.0 - 29.0 mmol/L Final   12/16/2019 27.0 22.0 - 29.0 mmol/L Final      BUN BUN   Date Value Ref Range Status   12/18/2019 33 (H) 8 - 23 mg/dL Final   12/17/2019 28 (H) 8 - 23 mg/dL Final   12/16/2019 21 8 - 23 mg/dL Final      Creatinine Creatinine   Date Value Ref Range Status   12/18/2019 0.61 0.57 - 1.00 mg/dL Final   12/17/2019 0.56 (L) 0.57 - 1.00 mg/dL Final   12/16/2019 0.62 0.57 - 1.00 mg/dL Final      Calcium Calcium   Date Value Ref Range Status   12/18/2019 9.0 8.6 - 10.5 mg/dL Final   12/17/2019 8.8 8.6 - 10.5 mg/dL Final   12/16/2019 8.5 (L) 8.6 - 10.5 mg/dL Final      PO4 No components found for: PO4   Albumin Albumin   Date Value Ref Range Status   12/18/2019 2.80 (L) 3.50 - 5.20 g/dL Final   12/17/2019 3.00 (L) 3.50 - 5.20 g/dL Final   12/16/2019 2.80 (L) 3.50 - 5.20 g/dL Final      Magnesium No results found for: MG   Uric Acid No components found for: URIC ACID     Imaging Results (Last 72 Hours)     ** No results found for the last 72 hours. **          Results for orders placed during the hospital encounter of 12/12/19   XR Chest 2 View    Narrative DATE OF EXAM:  12/12/2019 10:48 AM     PROCEDURE:  XR CHEST 2 VW-     INDICATIONS:  cough       COMPARISON:  CT chest 11/26/2019. AP portable chest 11/25/2019.     TECHNIQUE:   Two radiologic views of the chest.     FINDINGS:  Right lower lobe airspace disease persists, and appears fairly similar  to the 11/25/2019 examination. There is new ill-defined airspace disease  in the left lower lobe.     Stable cardiac enlargement. Left chest wall luis catheter remains at  the cavoatrial junction. No visible pneumothorax. Surgical changes of  the right humerus. Degenerative changes of the left humerus. Chronic  appearing deformity of the distal left clavicle. Diffuse osteopenia.     There is a chronic appearing severe compression fracture of the T5  vertebral body, chronic appearing compression fracture of the superior  endplate of T4,  corresponding to the CT chest from 11/26/2019. There is,  however, an age-indeterminate compression fracture of T8, new since  11/26/2019 CT chest.       Impression    1. Persistent right lower lobe airspace disease worrisome for pneumonia.  New left lower lobe airspace disease worrisome for developing pneumonia.  2. Age indeterminate compression fracture of T8, new since 11/26/2019  comparison. Chronic T4 and T5 compression fractures.     Electronically Signed By-Dr. Ashley Jerez MD On:12/12/2019 11:18 AM  This report was finalized on [id number] by Dr. Ashley Jerez MD.   XR Spine Lumbar Complete 4+VW    Narrative XR SPINE LUMBAR COMPLETE 4+VW-     Date of Exam: 12/12/2019 10:22 AM     Indication: Low back pain after fall.     Comparison Exams: September 6, 2017     Technique: 5 radiographs of the lumbar spine     FINDINGS:  No acute fracture is identified. There is generalized osteopenia. The  alignment appears stable, with grade 1/2 anterolisthesis of L4 on L5.  The vertebral body heights appear normal. There is moderate degenerative  loss of disc height at L4-5 and L5-S1. There is mild to moderate facet  arthropathy at L3-4 through L5-S1. The soft tissues are unremarkable.       Impression 1.No acute fracture or traumatic subluxation of lumbar spine identified.  2.Degenerative changes as described above.     Electronically Signed By-DR. Jevon Dominguez MD On:12/12/2019 11:16 AM  This report was finalized on [id number] by DR. Jevon Dominguez MD.              ASSESSMENT / PLAN      Pneumonia due to infectious organism    Dysphagia, pharyngoesophageal phase    Decubitus ulcer of ischial area, right, stage IV (CMS/HCC)    Closed displaced fracture of acromial end of left clavicle      1. PNA--- Improving with ABX.     2. HYPONATREMIA--- Mild. Known SIADH. On demeclycycline. TSH 1.5 U OSMO 370.     3. HTH WITH CKD--- Controlled with clonidine patch which was recently applied.     4. HX OF TONSILLAR  CANCER---     5. CHRONIC MALNUTRITION--- g-tube in place    6. STAGE 4 DECUBITIS ULCER OF ISCHIAL RIGHT AREA--- followed by wound care      Ok to D/C per renal standpoint. Will follow up with patient in office in 2 weeks. BMP in one week.        Paola Torres MD  [facility]  [phone]  12/18/19  7:50 AM

## 2019-12-18 NOTE — PROGRESS NOTES
Continued Stay Note  ALFREDO Lindquist     Patient Name: Sri Bobo  MRN: [id number]  Today's Date: 12/18/2019    Admit Date: 12/12/2019    Discharge Plan     Row Name 12/18/19 1708       Plan    Plan Comments  SW attempted to call spouse as he wasn't in pt room at time of discharge regarding [facility] resource information. Voicemail left 12/18 for call back to provide information.      COY Pisano    Phone: [phone]  Cell: [phone]  Fax: [phone]  [email or web address]

## 2019-12-18 NOTE — DISCHARGE SUMMARY
Date of Discharge:  12/18/2019    Discharge Diagnosis:   Pneumonia due to infectious organism   Respiratory infection secondary to rhinovirus  Decubitus ulcer of the ischium  Dysphagia      Presenting Problem/History of Present Illness  Active Hospital Problems    Diagnosis  POA   • **Pneumonia due to infectious organism [J18.9]  Yes   • Closed displaced fracture of acromial end of left clavicle [S42.032A]  Yes   • Decubitus ulcer of ischial area, right, stage IV (CMS/HCC) [L89.314]  Yes   • Dysphagia, pharyngoesophageal phase [R13.14]  Yes      Resolved Hospital Problems   No resolved problems to display.         Hospital Course  Patient is a 59 y.o. female presented with cough fever and confusion.  The patient is a 59-year-old white female is admitted to hospital with cough fever and confusion.     Patient was admitted to hospital from the emergency room.     The patient states that for the past 2 to 3 days she has had increased cough productive of sputum.  She states she is had low-grade fever.  Patient denied sore throat headache nausea vomiting or diarrhea.  Patient came to the emergency room.  In emergency room she was evaluated.  She was found to have right basilar pneumonia.  Patient was admitted for further care.    The patient was admitted to hospital.  She was seen in consultation by pulmonary medicine.  The patient had evaluation.  Evaluation did reveal she had infection with rhinovirus.  Patient was provided supportive care for this.  Patient also was treated with antibiotics empirically for respiratory infection.  At this time she is improved.  Her cough shortness of breath and wheezing is significantly better.    Patient while in the hospital was seen in consultation by wound therapy because of the decubitus.  Turns out the patient did not receive the IV antibiotics as prescribed when patient was discharged.  Apparently there was an issue with the co-pay for the home health provider.  Patient  received wound therapy while she was in the hospital.  She will continue with local dressings for this and continue to keep pressure off of the sore.    Was also seen in consultation by the discharge planners.  Originally the intent was for patient to be discharged from the hospital to a skilled nursing facility.  Discharge planners were not able to find a facility that would allow the patient's family to bring tube feedings from home.  Patient does not take any nourishment by mouth.  Ultimately family members decided to take the patient home.  Arrangements will be made for patient to receive outpatient physical therapy.      At the present time patient's reached maximal hospital benefit can be discharged home.  Procedures Performed         Consults:   Consults     Date and Time Order Name Status Description    12/12/2019 1750 Inpatient Gastroenterology Consult      12/12/2019 1742 Inpatient Pulmonology Consult      12/12/2019 1742 Inpatient Nephrology Consult Completed     11/28/2019 1122 Inpatient Orthopedic Surgery Consult Completed     11/26/2019 1101 Inpatient General Surgery Consult Completed     11/25/2019 1709 Family Medicine Consult Completed           Pertinent Test Results:   Lab Results (last 48 hours)     Procedure Component Value Units Date/Time    Manual Differential [[id number]]  (Abnormal) Collected:  12/18/19 0549    Specimen:  Blood Updated:  12/18/19 0729     Neutrophil % 69.0 %      Lymphocyte % 23.0 %      Monocyte % 3.0 %      Eosinophil % 2.0 %      Atypical Lymphocyte % 3.0 %      Neutrophils Absolute 3.66 10*3/mm3      Lymphocytes Absolute 1.22 10*3/mm3      Monocytes Absolute 0.16 10*3/mm3      Eosinophils Absolute 0.11 10*3/mm3      RBC Morphology Normal     WBC Morphology Normal     Platelet Morphology Normal    CBC & Differential [[id number]] Collected:  12/18/19 0549    Specimen:  Blood Updated:  12/18/19 0729    Narrative:       The following orders were created for panel order CBC &  Differential.  Procedure                               Abnormality         Status                     ---------                               -----------         ------                     CBC Auto Differential[[id number]]        Abnormal            Final result                 Please view results for these tests on the individual orders.    CBC Auto Differential [[id number]]  (Abnormal) Collected:  12/18/19 0549    Specimen:  Blood Updated:  12/18/19 0729     WBC 5.30 10*3/mm3      RBC 2.73 10*6/mm3      Hemoglobin 8.4 g/dL      Hematocrit 24.8 %      MCV 90.6 fL      MCH 30.7 pg      MCHC 33.9 g/dL      RDW 15.5 %      RDW-SD 49.9 fl      MPV 7.0 fL      Platelets 337 10*3/mm3     Scan Slide [[id number]] Collected:  12/18/19 0549    Specimen:  Blood Updated:  12/18/19 0729     Scan Slide --     Comment: See Manual Differential Results       Narrative:       Slide Reviewed      Renal Function Panel [[id number]]  (Abnormal) Collected:  12/18/19 0549    Specimen:  Blood Updated:  12/18/19 0626     Glucose 93 mg/dL      BUN 33 mg/dL      Creatinine 0.61 mg/dL      Sodium 133 mmol/L      Potassium 4.2 mmol/L      Chloride 93 mmol/L      CO2 32.0 mmol/L      Calcium 9.0 mg/dL      Albumin 2.80 g/dL      Phosphorus 3.1 mg/dL      Anion Gap 8.0 mmol/L      BUN/Creatinine Ratio 54.1     eGFR Non African Amer 100 mL/min/1.73     Narrative:       GFR Normal >60  Chronic Kidney Disease <60  Kidney Failure <15      Blood Culture - Blood, Arm, Right [[id number]] Collected:  12/12/19 1212    Specimen:  Blood from Arm, Right Updated:  12/17/19 1230     Blood Culture No growth at 5 days    Blood Culture - Blood, Blood, Venous Line [[id number]] Collected:  12/12/19 1157    Specimen:  Blood, Venous Line Updated:  12/17/19 1216     Blood Culture No growth at 5 days    CBC & Differential [[id number]] Collected:  12/17/19 0429    Specimen:  Blood Updated:  12/17/19 0734    Narrative:       The following orders were created for panel order CBC  & Differential.  Procedure                               Abnormality         Status                     ---------                               -----------         ------                     CBC Auto Differential[[id number]]        Abnormal            Final result                 Please view results for these tests on the individual orders.    CBC Auto Differential [[id number]]  (Abnormal) Collected:  12/17/19 0429    Specimen:  Blood Updated:  12/17/19 0734     WBC 6.90 10*3/mm3      RBC 2.87 10*6/mm3      Hemoglobin 8.7 g/dL      Hematocrit 26.1 %      MCV 91.0 fL      MCH 30.5 pg      MCHC 33.5 g/dL      RDW 15.6 %      RDW-SD 49.9 fl      MPV 7.4 fL      Platelets 346 10*3/mm3     Scan Slide [[id number]] Collected:  12/17/19 0429    Specimen:  Blood Updated:  12/17/19 0734     Scan Slide --     Comment: See Manual Differential Results       Manual Differential [[id number]]  (Abnormal) Collected:  12/17/19 0429    Specimen:  Blood Updated:  12/17/19 0734     Neutrophil % 63.0 %      Lymphocyte % 17.0 %      Monocyte % 10.0 %      Eosinophil % 3.0 %      Bands %  5.0 %      Myelocyte % 2.0 %      Neutrophils Absolute 4.69 10*3/mm3      Lymphocytes Absolute 1.17 10*3/mm3      Monocytes Absolute 0.69 10*3/mm3      Eosinophils Absolute 0.21 10*3/mm3      RBC Morphology Normal     Toxic Granulation Mod/2+     Platelet Morphology Normal    Renal Function Panel [[id number]]  (Abnormal) Collected:  12/17/19 0429    Specimen:  Blood Updated:  12/17/19 0547     Glucose 99 mg/dL      BUN 28 mg/dL      Creatinine 0.56 mg/dL      Sodium 131 mmol/L      Potassium 4.3 mmol/L      Chloride 92 mmol/L      CO2 30.0 mmol/L      Calcium 8.8 mg/dL      Albumin 3.00 g/dL      Phosphorus 2.4 mg/dL      Anion Gap 9.0 mmol/L      BUN/Creatinine Ratio 50.0     eGFR Non African Amer 110 mL/min/1.73     Narrative:       GFR Normal >60  Chronic Kidney Disease <60  Kidney Failure <15           Imaging Results (Last 24 Hours)     ** No results  found for the last 24 hours. **         ECG/EMG Results (last 24 hours)     ** No results found for the last 24 hours. **           Condition on Discharge: Improved    Vital Signs  Temp:  [97.2 °F (36.2 °C)-98.9 °F (37.2 °C)] 97.5 °F (36.4 °C)  Heart Rate:  [69-86] 69  Resp:  [14-22] 15  BP: ()/(48-96) 88/51     Physical Exam   Constitutional:   Chronically ill-appearing   Cardiovascular: Normal rate, regular rhythm, normal heart sounds and intact distal pulses.   Pulmonary/Chest: Effort normal and breath sounds normal.   Abdominal: Soft. Bowel sounds are normal.   Musculoskeletal: Normal range of motion.   Skin: Skin is warm and dry.   Nursing note and vitals reviewed.       Discharge Disposition  Home or Self Care    Discharge Medications     Discharge Medications      New Medications      Instructions Start Date   escitalopram 10 MG tablet  Commonly known as:  LEXAPRO   10 mg, Oral, Daily   Start Date:  December 19, 2019        Changes to Medications      Instructions Start Date   amoxicillin 250 MG/5ML suspension  Commonly known as:  AMOXIL  What changed:  additional instructions   750 mg, Oral, 2 Times Daily      clopidogrel 75 MG tablet  Commonly known as:  PLAVIX  What changed:  See the new instructions.   TAKE ONE TABLET BY MOUTH DAILY ** SEE DOCTOR FOR REFILLS **      sodium chloride 1 g tablet  What changed:  See the new instructions.   TAKE ONE TABLET TWICE A DAY PER G-TUBE ROUTE WITH MEALS         Continue These Medications      Instructions Start Date   albuterol sulfate  (90 Base) MCG/ACT inhaler  Commonly known as:  PROVENTIL HFA;VENTOLIN HFA;PROAIR HFA   2 puffs, Inhalation, Every 4 Hours PRN      amitriptyline 50 MG tablet  Commonly known as:  ELAVIL   100 mg, Oral, Nightly      calcium-vitamin D 500-200 MG-UNIT per tablet  Commonly known as:  OSCAL-500   1 tablet, Oral, 2 Times Daily      carvedilol 12.5 MG tablet  Commonly known as:  COREG   12.5 mg, Oral, 2 Times Daily With Meals       cyanocobalamin 1000 MCG/ML injection   1,000 mcg, Intramuscular, Every 28 Days      demeclocycline 300 MG tablet  Commonly known as:  DECLOMYCIN   300 mg, Oral, 2 Times Daily      famotidine 40 MG tablet  Commonly known as:  PEPCID   40 mg, Oral, Daily      hydrALAZINE 25 MG tablet  Commonly known as:  APRESOLINE   25 mg, Oral, 2 Times Daily PRN      ipratropium-albuterol 0.5-2.5 mg/3 ml nebulizer  Commonly known as:  DUO-NEB   3 mL, Nebulization, Every 4 Hours PRN      levETIRAcetam 500 MG tablet  Commonly known as:  KEPPRA   500 mg, Oral, 2 Times Daily      lidocaine 5 %  Commonly known as:  LIDODERM   1 patch, Transdermal, Every 24 Hours Scheduled, Remove & Discard patch within 12 hours or as directed by MD      oxyCODONE 10 MG tablet  Commonly known as:  ROXICODONE   10 mg, Oral, 4 Times Daily PRN      sodium hypochlorite 0.125 % topical solution  Commonly known as:  DAKIN'S   Topical, Every 12 Hours Scheduled      tiZANidine 4 MG tablet  Commonly known as:  ZANAFLEX   4 mg, Oral, Every 8 Hours PRN             Discharge Diet:   Diet Instructions     Diet: Regular      Discharge Diet:  Regular          Activity at Discharge:   Activity Instructions     Activity as Tolerated            Follow-up Appointments  Future Appointments   Date Time Provider Department Center   1/22/2020  9:00 AM Magdalena Carbajal MD [facility]  [facility] None   1/30/2020 11:45 AM Leonid Villeda Jr., MD [facility] None     Additional Instructions for the Follow-ups that You Need to Schedule     Ambulatory Referral to Physical Therapy Evaluate and treat   As directed      Specialty needed:  Evaluate and treat         Discharge Follow-up with PCP   As directed       Currently Documented PCP:    Leonid Villeda Jr., MD    PCP Phone Number:    [phone]     Follow Up Details:  1 - 2 weeks         Basic Metabolic Panel    Dec 25, 2019 (Approximate)            Test Results Pending at Discharge       Leonid Villeda Jr.,  MD  12/18/19  2:30 PM

## 2019-12-18 NOTE — PROGRESS NOTES
Daily Progress Note        Pneumonia due to infectious organism    Dysphagia, pharyngoesophageal phase    Decubitus ulcer of ischial area, right, stage IV (CMS/HCC)    Closed displaced fracture of acromial end of left clavicle      Assessment     rhino virus positive     CT chest  showed  Persistent consolidation right lower lobe and mild Bronchiectasis,  Overall  Compatible with chronic aspiration and not  Changed from prior CT scan    Patient currently on room air denies any shortness of breath from baseline     Sputum cultures mixed respiratory suzy    Wound culture showed light growth of E. coli ESBL and enterococcus species      past medical history significant for:  bronchoscopy 03/27/2019 showed E coli with ESBL treated with meropenem  bronchoscopy 01/25/2019 AFB cultures negative  CT scan 01/17/2019 showed right lower lobe obstruction with mucus plug or tumor   bronchoscopy July 2018 MRSA pneumonia  Right upper lobe nodules,  CT scan of the chest  08/24/2018 compared to 07/26/2018 just showed decreased size  favoring infectious process  Status post a fall and right wrist fracture  Patient was treated recently for MRSA  Right lower lobe pneumonia with IV vancomycin  CT scan of the chest 07/26/2018 showed 2 right upper lobe lung nodules suspicious for malignancy     Bronchoscopy 07/27/2018 for coughing up blood however there was No evidence of active hemoptysis  Moderate to severe mucopurulent secretions were noted in the right lower lobe and right upper lobe  FNA from RLL negative cytology, BAL Rt lung negative cytology  History of left tonsillar cancer in 2013, status post PEG tube  Status post radiation     Status post EGD 10/03/2017 no obvious source of bleeding  BRONCHOSCOPY 10/05/2017, no hemoptysis, No endobronchial lesions  Chronic inflammatory changes cultures were positive for group B streptococcal pneumonia     seizure disorder  Osteoporosis  Peripheral vascular disease  Protein calorie  malnutrition        Plan  Antibiotics  Completed  meropenem     BD                      LOS: 5 days     Subjective         Objective     Vital signs for last 24 hours:  Vitals:    12/18/19 0718 12/18/19 0722 12/18/19 1047 12/18/19 1500   BP:   (!) 88/51 146/77   Pulse: 74 75 69 75   Resp: 14  15 16   Temp:   97.5 °F (36.4 °C) 97.5 °F (36.4 °C)   TempSrc:   Axillary Axillary   SpO2: 98%  98% 97%   Weight:       Height:           Intake/Output last 3 shifts:  I/O last 3 completed shifts:  In: -   Out: 4050 [Urine:4050]  Intake/Output this shift:  No intake/output data recorded.      Radiology  Imaging Results (Last 24 Hours)     ** No results found for the last 24 hours. **          Labs:  Results from last 7 days   Lab Units 12/18/19  0549   WBC 10*3/mm3 5.30   HEMOGLOBIN g/dL 8.4*   HEMATOCRIT % 24.8*   PLATELETS 10*3/mm3 337     Results from last 7 days   Lab Units 12/18/19  0549  12/13/19  0334   SODIUM mmol/L 133*   < > 136   POTASSIUM mmol/L 4.2   < > 3.7   CHLORIDE mmol/L 93*   < > 96*   CO2 mmol/L 32.0*   < > 26.0   BUN mg/dL 33*   < > 23   CREATININE mg/dL 0.61   < > 0.69   CALCIUM mg/dL 9.0   < > 8.6   BILIRUBIN mg/dL  --   --  0.4   ALK PHOS U/L  --   --  85   ALT (SGPT) U/L  --   --  8   AST (SGOT) U/L  --   --  14   GLUCOSE mg/dL 93   < > 89    < > = values in this interval not displayed.         Results from last 7 days   Lab Units 12/18/19  0549 12/17/19  0429 12/16/19  0518   ALBUMIN g/dL 2.80* 3.00* 2.80*             Results from last 7 days   Lab Units 12/14/19  0611   MAGNESIUM mg/dL 1.7         Results from last 7 days   Lab Units 12/16/19  0518   TSH uIU/mL 1.150           Meds:   SCHEDULE    Infusions    No current facility-administered medications for this encounter.   PRNs      Physical Exam:  Physical Exam   Cardiovascular:   Murmur heard.  Pulmonary/Chest: No respiratory distress. She has wheezes. She has rales. She exhibits no tenderness.   Abdominal: She exhibits no distension. There is no  tenderness.   Musculoskeletal: She exhibits no edema.       ROS  Review of Systems   HENT: Positive for congestion, rhinorrhea and sneezing.    Respiratory: Positive for cough. Negative for apnea, choking, chest tightness, shortness of breath and wheezing.    Cardiovascular: Negative for leg swelling.             Total time spent with patient greater than: 1 Hour

## 2019-12-19 ENCOUNTER — READMISSION MANAGEMENT (OUTPATIENT)
Dept: CALL CENTER | Facility: HOSPITAL | Age: 59
End: 2019-12-19

## 2019-12-19 NOTE — PROGRESS NOTES
Case Management Discharge Note           Provided post acute provider list?: Yes  Post Acuite Provider List: Delivered  Delivered To: Support Person  Support Person: bailey pt's daughter                  Final Discharge Disposition Code: 01 - home or self-care

## 2019-12-20 ENCOUNTER — READMISSION MANAGEMENT (OUTPATIENT)
Dept: CALL CENTER | Facility: HOSPITAL | Age: 59
End: 2019-12-20

## 2019-12-20 NOTE — OUTREACH NOTE
Prep Survey      Responses   Facility patient discharged from?  [facility]   Is patient eligible?  Yes   Discharge diagnosis  Pneumonia  Decubitus ulcer of the ischium  Dysphagia  CLAVICLE FX CONFUSION   Does the patient have one of the following disease processes/diagnoses(primary or secondary)?  COPD/Pneumonia   Does the patient have Home health ordered?  No   Is there a DME ordered?  No   Prep survey completed?  Yes          Kelsey Wade RN

## 2019-12-20 NOTE — OUTREACH NOTE
COPD/PN Week 1 Survey      Responses   Facility patient discharged from?  [facility]   Does the patient have one of the following disease processes/diagnoses(primary or secondary)?  COPD/Pneumonia   Is there a successful TCM telephone encounter documented?  No   Was the primary reason for admission:  Pneumonia   Week 1 attempt successful?  No   Unsuccessful attempts  Attempt 1 [NO ANSWER, LEFT VM]          Marisela Lao LPN

## 2019-12-23 ENCOUNTER — READMISSION MANAGEMENT (OUTPATIENT)
Dept: CALL CENTER | Facility: HOSPITAL | Age: 59
End: 2019-12-23

## 2019-12-23 NOTE — OUTREACH NOTE
COPD/PN Week 1 Survey      Responses   Facility patient discharged from?  [facility]   Does the patient have one of the following disease processes/diagnoses(primary or secondary)?  COPD/Pneumonia   Is there a successful TCM telephone encounter documented?  No   Was the primary reason for admission:  Pneumonia   Week 1 attempt successful?  No   Unsuccessful attempts  Attempt 2          Kelsey Restrepo RN

## 2019-12-26 ENCOUNTER — READMISSION MANAGEMENT (OUTPATIENT)
Dept: CALL CENTER | Facility: HOSPITAL | Age: 59
End: 2019-12-26

## 2019-12-26 NOTE — OUTREACH NOTE
COPD/PN Week 2 Survey      Responses   Facility patient discharged from?  [facility]   Does the patient have one of the following disease processes/diagnoses(primary or secondary)?  COPD/Pneumonia   Was the primary reason for admission:  Pneumonia   Week 2 attempt successful?  No   Unsuccessful attempts  Attempt 1          Jevon Lang RN

## 2019-12-27 ENCOUNTER — READMISSION MANAGEMENT (OUTPATIENT)
Dept: CALL CENTER | Facility: HOSPITAL | Age: 59
End: 2019-12-27

## 2019-12-27 NOTE — OUTREACH NOTE
COPD/PN Week 2 Survey      Responses   Facility patient discharged from?  [facility]   Does the patient have one of the following disease processes/diagnoses(primary or secondary)?  COPD/Pneumonia   Was the primary reason for admission:  Pneumonia   Week 2 attempt successful?  No   Unsuccessful attempts  Attempt 2 [NO ANSWER, LEFT VM]          Marisela Lao LPN

## 2020-01-02 ENCOUNTER — APPOINTMENT (OUTPATIENT)
Dept: WOUND CARE | Facility: HOSPITAL | Age: 60
End: 2020-01-02

## 2023-01-06 NOTE — PROGRESS NOTES
"NEPHROLOGY PROGRESS NOTE------[facility]    [facility]  [phone]  John Canales MD      Patient Care Team:  Leonid Villeda Jr., MD as PCP - General  Leonid Villeda Jr., MD as PCP - Family Medicine  John Canales MD as Consulting Physician (Nephrology)      Provider:  John Canales MD  Patient Name: Sri Bobo  :  1960    SUBJECTIVE:  F/u HTN  No cp/soa  Feels better    Medication:    acetylcysteine 2 mL Nebulization BID - RT   albuterol 2.5 mg Nebulization BID - RT   aluminum-magnesium hydroxide-simethicone 15 mL Oral BID   amitriptyline 100 mg Oral Nightly   BENEPROTEIN 1 packet Per G Tube TID   carvedilol 12.5 mg Oral BID With Meals   cloNIDine 1 patch Transdermal Weekly   clopidogrel 75 mg Oral Daily   cyanocobalamin 1,000 mcg Intramuscular Q28 Days   demeclocycline 300 mg Oral Q12H   famotidine 20 mg Oral Daily   heparin (porcine) 5,000 Units Subcutaneous Q12H   levETIRAcetam 500 mg Oral Q12H   meropenem 500 mg Intravenous Q8H   miconazole  Topical Q12H   pharmacy 1 each Does not apply Once   polyethylene glycol 17 g Oral Daily   sodium chloride 10 mL Intravenous Q12H   sodium chloride 1 g Oral BID   sodium hypochlorite  Topical Q12H       sodium chloride 75 mL/hr Last Rate: 75 mL/hr (12/15/19 0034)       OBJECTIVE    Vital Sign Min/Max for last 24 hours  Temp  Min: 97.3 °F (36.3 °C)  Max: 98.4 °F (36.9 °C)   BP  Min: 110/64  Max: 188/101   Pulse  Min: 77  Max: 88   Resp  Min: 12  Max: 22   SpO2  Min: 87 %  Max: 100 %   No data recorded   Weight  Min: 41.9 kg (92 lb 6 oz)  Max: 41.9 kg (92 lb 6 oz)     Flowsheet Rows      First Filed Value   Admission Height  157.5 cm (62\") Documented at 2019 0905   Admission Weight  39.5 kg (87 lb 1.3 oz) Documented at 2019 0905          No intake/output data recorded.  I/O last 3 completed shifts:  In: 0   Out: 2250 [Urine:2250]    Physical Exam:  General Appearance: alert, appears stated age and " PA submitted to [employer] via [facility]  (Key: [id number])   cooperative  Head: normocephalic, without obvious abnormality and atraumatic  Eyes: conjunctivae and sclerae normal and no icterus  Neck: supple and no JVD  Lungs: clear to auscultation and respirations regular  Heart: regular rhythm & normal rate and normal S1, S2  Chest: Wall no abnormalities observed  Abdomen: normal bowel sounds and soft non-tender  Extremities: moves extremities well, no edema, no cyanosis and no redness  Skin: no bleeding, bruising or rash, turgor normal, color normal and no lesions noted  Neurologic: Alert, and oriented. No focal deficits    Labs:    WBC WBC   Date Value Ref Range Status   12/15/2019 6.20 3.40 - 10.80 10*3/mm3 Final   12/14/2019 5.60 3.40 - 10.80 10*3/mm3 Final   12/13/2019 6.80 3.40 - 10.80 10*3/mm3 Final      HGB Hemoglobin   Date Value Ref Range Status   12/15/2019 7.9 (L) 12.0 - 15.9 g/dL Final   12/14/2019 7.9 (L) 12.0 - 15.9 g/dL Final   12/13/2019 8.9 (L) 12.0 - 15.9 g/dL Final      HCT Hematocrit   Date Value Ref Range Status   12/15/2019 23.5 (L) 34.0 - 46.6 % Final   12/14/2019 23.7 (L) 34.0 - 46.6 % Final   12/13/2019 27.2 (L) 34.0 - 46.6 % Final      Platlets No results found for: LABPLAT   MCV MCV   Date Value Ref Range Status   12/15/2019 92.2 79.0 - 97.0 fL Final   12/14/2019 92.3 79.0 - 97.0 fL Final   12/13/2019 91.8 79.0 - 97.0 fL Final          Sodium Sodium   Date Value Ref Range Status   12/15/2019 136 136 - 145 mmol/L Final   12/14/2019 137 136 - 145 mmol/L Final   12/13/2019 136 136 - 145 mmol/L Final      Potassium Potassium   Date Value Ref Range Status   12/15/2019 4.0 3.5 - 5.2 mmol/L Final   12/14/2019 4.1 3.5 - 5.2 mmol/L Final   12/13/2019 3.7 3.5 - 5.2 mmol/L Final      Chloride Chloride   Date Value Ref Range Status   12/15/2019 103 98 - 107 mmol/L Final   12/14/2019 103 98 - 107 mmol/L Final   12/13/2019 96 (L) 98 - 107 mmol/L Final      CO2 CO2   Date Value Ref Range Status   12/15/2019 28.0 22.0 - 29.0 mmol/L Final   12/14/2019 26.0 22.0 -  29.0 mmol/L Final   12/13/2019 26.0 22.0 - 29.0 mmol/L Final      BUN BUN   Date Value Ref Range Status   12/15/2019 16 8 - 23 mg/dL Final   12/14/2019 30 (H) 8 - 23 mg/dL Final   12/13/2019 23 8 - 23 mg/dL Final      Creatinine Creatinine   Date Value Ref Range Status   12/15/2019 0.48 (L) 0.57 - 1.00 mg/dL Final   12/14/2019 0.57 0.57 - 1.00 mg/dL Final   12/13/2019 0.69 0.57 - 1.00 mg/dL Final      Calcium Calcium   Date Value Ref Range Status   12/15/2019 8.6 8.6 - 10.5 mg/dL Final   12/14/2019 7.9 (L) 8.6 - 10.5 mg/dL Final   12/13/2019 8.6 8.6 - 10.5 mg/dL Final      PO4 No components found for: PO4   Albumin Albumin   Date Value Ref Range Status   12/15/2019 2.90 (L) 3.50 - 5.20 g/dL Final   12/14/2019 2.90 (L) 3.50 - 5.20 g/dL Final   12/13/2019 3.20 (L) 3.50 - 5.20 g/dL Final      Magnesium Magnesium   Date Value Ref Range Status   12/14/2019 1.7 1.6 - 2.4 mg/dL Final      Uric Acid No components found for: URIC ACID     Imaging Results (Last 72 Hours)     Procedure Component Value Units Date/Time    CT Abdomen Pelvis Without Contrast [[id number]] Collected:  12/12/19 1452     Updated:  12/12/19 1459    Narrative:       CT ABDOMEN PELVIS WO CONTRAST-     Date of Exam: 12/12/2019 2:45 PM     Indication: Abdominal pain, unspecified  . Constipation.     Comparison: None available.     Technique: Contiguous axial CT images were obtained from the lung bases  to the pubic symphysis without contrast. Sagittal and coronal  reconstructions were performed.  Automated exposure control and  iterative reconstruction methods were used.     FINDINGS:     Large colonic stool burden greatest in the cecum and ascending colon no  pneumatosis or free air or abscess.     Percutaneous gastrostomy tube in place.     No abnormally dilated small bowel loops are identified. Noncontrast  appearance of the liver, gallbladder, spleen, adrenals and kidneys is  within normal limits. Pancreas is moderately atrophic.     Dense airspace  disease changes are present in the right middle lobe and  right lower lobe. Noncalcified nodular densities are present in the left  lower lobe measuring 4 mm (image 6) and 7 mm (image 7), with mild left  basilar atelectasis. There is moderate cardiomegaly.     Old bilateral pubic body and right inferior pubic rami fractures. ORIF  changes of the right femur. Chronic appearing bilateral sacral ala  fractures. Chronic appearing fracture of L5 vertebral body with  compression. Grade 1-2 anterolisthesis L4 upon L5, approximately 13 mm,  thought to be related to severe facet arthropathy.          Impression:          1. Large colonic stool burden, most severe within the cecum and  ascending colon, consistent with the appearance of constipation.  2. No obstructing bowel abnormality is seen, and there is no evidence of  active bowel inflammation.  3. Dense right middle lobe and right lower lobe airspace disease.  Correlate clinically for pneumonia.  4. Small nodular densities in the left lower lobe are nonspecific but  favored to reflect benign infectious/inflammatory nodules.  5. Chronic appearing fractures of the pelvis and L5 vertebral body.  Right femur ORIF  6. Grade 1-2 anterolisthesis L5 upon S1 thought to be related to severe  facet arthropathy.           Electronically Signed By-Dr. Ashley Jerez MD On:12/12/2019 2:57 PM  This report was finalized on [id number] by Dr. Ashley Jerez MD.          Results for orders placed during the hospital encounter of 12/12/19   XR Chest 2 View    Narrative DATE OF EXAM:  12/12/2019 10:48 AM     PROCEDURE:  XR CHEST 2 VW-     INDICATIONS:  cough       COMPARISON:  CT chest 11/26/2019. AP portable chest 11/25/2019.     TECHNIQUE:   Two radiologic views of the chest.     FINDINGS:  Right lower lobe airspace disease persists, and appears fairly similar  to the 11/25/2019 examination. There is new ill-defined airspace disease  in the left lower lobe.     Stable cardiac  enlargement. Left chest wall luis catheter remains at  the cavoatrial junction. No visible pneumothorax. Surgical changes of  the right humerus. Degenerative changes of the left humerus. Chronic  appearing deformity of the distal left clavicle. Diffuse osteopenia.     There is a chronic appearing severe compression fracture of the T5  vertebral body, chronic appearing compression fracture of the superior  endplate of T4, corresponding to the CT chest from 11/26/2019. There is,  however, an age-indeterminate compression fracture of T8, new since  11/26/2019 CT chest.       Impression    1. Persistent right lower lobe airspace disease worrisome for pneumonia.  New left lower lobe airspace disease worrisome for developing pneumonia.  2. Age indeterminate compression fracture of T8, new since 11/26/2019  comparison. Chronic T4 and T5 compression fractures.     Electronically Signed By-Dr. Ashley Jerez MD On:12/12/2019 11:18 AM  This report was finalized on [id number] by Dr. Ashley Jerez MD.   XR Spine Lumbar Complete 4+VW    Narrative XR SPINE LUMBAR COMPLETE 4+VW-     Date of Exam: 12/12/2019 10:22 AM     Indication: Low back pain after fall.     Comparison Exams: September 6, 2017     Technique: 5 radiographs of the lumbar spine     FINDINGS:  No acute fracture is identified. There is generalized osteopenia. The  alignment appears stable, with grade 1/2 anterolisthesis of L4 on L5.  The vertebral body heights appear normal. There is moderate degenerative  loss of disc height at L4-5 and L5-S1. There is mild to moderate facet  arthropathy at L3-4 through L5-S1. The soft tissues are unremarkable.       Impression 1.No acute fracture or traumatic subluxation of lumbar spine identified.  2.Degenerative changes as described above.     Electronically Signed By-DR. Jevon Dominguez MD On:12/12/2019 11:16 AM  This report was finalized on [id number] by DR. Jevon Dominguez MD.              ASSESSMENT / PLAN       Pneumonia due to infectious organism    Dysphagia, pharyngoesophageal phase    Decubitus ulcer of ischial area, right, stage IV (CMS/HCC)    Closed displaced fracture of acromial end of left clavicle    · Hypertension--better. Clonidine patch was applied.   · Hyponatremia, mild--probably due to SIADH. Its better. On demeclocycline  · Hx tonsillar cancer  · Nutrition--s/p g-tube replacement  · PNA--on ATBx     Bp reasonable controlled  Cr stable, stop fluids  ATBx per pulm    John Canales MD  [facility]  [phone]  12/15/19  12:37 PM

## (undated) DEVICE — SYR LUERLOK 30CC

## (undated) DEVICE — UNDERGLV SURG BIOGEL INDICAT PF 8 GRN

## (undated) DEVICE — GAUZE,SPONGE,FLUFF,6"X6.75",STRL,5/TRAY: Brand: MEDLINE

## (undated) DEVICE — CUFF SCD HEMOFORCE SEQ CALF STD MD

## (undated) DEVICE — GLV SURG TRIUMPH LT PF LTX 7.5 STRL

## (undated) DEVICE — NDL HYPO PRECISIONGLIDE REG 25G 1 1/2

## (undated) DEVICE — SYR LL TP 10ML STRL

## (undated) DEVICE — SOL IRRIG H2O 1000ML STRL

## (undated) DEVICE — CONTAINER,SPECIMEN,OR STERILE,4OZ: Brand: MEDLINE

## (undated) DEVICE — PK MINOR LAPAROTOMY 50

## (undated) DEVICE — PK PROC TURNOVER

## (undated) DEVICE — WET SKIN PREP TRAY: Brand: MEDLINE INDUSTRIES, INC.

## (undated) DEVICE — PAD,ABDOMINAL,5"X9",STERILE,LF,1/PK: Brand: MEDLINE INDUSTRIES, INC.